# Patient Record
Sex: MALE | Race: WHITE | NOT HISPANIC OR LATINO | URBAN - METROPOLITAN AREA
[De-identification: names, ages, dates, MRNs, and addresses within clinical notes are randomized per-mention and may not be internally consistent; named-entity substitution may affect disease eponyms.]

---

## 2017-03-23 ENCOUNTER — EMERGENCY (EMERGENCY)
Facility: HOSPITAL | Age: 57
LOS: 1 days | Discharge: HOME | End: 2017-03-23
Attending: EMERGENCY MEDICINE | Admitting: EMERGENCY MEDICINE
Payer: COMMERCIAL

## 2017-03-23 VITALS
DIASTOLIC BLOOD PRESSURE: 93 MMHG | HEART RATE: 75 BPM | SYSTOLIC BLOOD PRESSURE: 172 MMHG | TEMPERATURE: 98 F | OXYGEN SATURATION: 99 % | RESPIRATION RATE: 18 BRPM

## 2017-03-23 VITALS
DIASTOLIC BLOOD PRESSURE: 89 MMHG | HEART RATE: 60 BPM | RESPIRATION RATE: 16 BRPM | OXYGEN SATURATION: 100 % | SYSTOLIC BLOOD PRESSURE: 155 MMHG | TEMPERATURE: 98 F

## 2017-03-23 DIAGNOSIS — M79.674 PAIN IN RIGHT TOE(S): ICD-10-CM

## 2017-03-23 LAB
ANION GAP SERPL CALC-SCNC: 10 MMOL/L — SIGNIFICANT CHANGE UP (ref 5–17)
BASOPHILS # BLD AUTO: 0.1 K/UL — SIGNIFICANT CHANGE UP (ref 0–0.2)
BASOPHILS NFR BLD AUTO: 1 % — SIGNIFICANT CHANGE UP (ref 0–2)
BUN SERPL-MCNC: 21 MG/DL — SIGNIFICANT CHANGE UP (ref 7–23)
CALCIUM SERPL-MCNC: 10.3 MG/DL — SIGNIFICANT CHANGE UP (ref 8.4–10.5)
CHLORIDE SERPL-SCNC: 102 MMOL/L — SIGNIFICANT CHANGE UP (ref 96–108)
CO2 SERPL-SCNC: 27 MMOL/L — SIGNIFICANT CHANGE UP (ref 22–31)
CREAT SERPL-MCNC: 0.97 MG/DL — SIGNIFICANT CHANGE UP (ref 0.5–1.3)
EOSINOPHIL # BLD AUTO: 0.5 K/UL — SIGNIFICANT CHANGE UP (ref 0–0.5)
EOSINOPHIL NFR BLD AUTO: 3.6 % — SIGNIFICANT CHANGE UP (ref 0–6)
GLUCOSE SERPL-MCNC: 85 MG/DL — SIGNIFICANT CHANGE UP (ref 70–99)
HCT VFR BLD CALC: 44.9 % — SIGNIFICANT CHANGE UP (ref 39–50)
HGB BLD-MCNC: 15.2 G/DL — SIGNIFICANT CHANGE UP (ref 13–17)
LYMPHOCYTES # BLD AUTO: 29.9 % — SIGNIFICANT CHANGE UP (ref 13–44)
LYMPHOCYTES # BLD AUTO: 3.8 K/UL — HIGH (ref 1–3.3)
MCHC RBC-ENTMCNC: 30.1 PG — SIGNIFICANT CHANGE UP (ref 27–34)
MCHC RBC-ENTMCNC: 33.7 GM/DL — SIGNIFICANT CHANGE UP (ref 32–36)
MCV RBC AUTO: 89.3 FL — SIGNIFICANT CHANGE UP (ref 80–100)
MONOCYTES # BLD AUTO: 1.1 K/UL — HIGH (ref 0–0.9)
MONOCYTES NFR BLD AUTO: 8.9 % — SIGNIFICANT CHANGE UP (ref 2–14)
NEUTROPHILS # BLD AUTO: 7.3 K/UL — SIGNIFICANT CHANGE UP (ref 1.8–7.4)
NEUTROPHILS NFR BLD AUTO: 56.6 % — SIGNIFICANT CHANGE UP (ref 43–77)
PLATELET # BLD AUTO: 225 K/UL — SIGNIFICANT CHANGE UP (ref 150–400)
POTASSIUM SERPL-MCNC: 4.4 MMOL/L — SIGNIFICANT CHANGE UP (ref 3.5–5.3)
POTASSIUM SERPL-SCNC: 4.4 MMOL/L — SIGNIFICANT CHANGE UP (ref 3.5–5.3)
RBC # BLD: 5.03 M/UL — SIGNIFICANT CHANGE UP (ref 4.2–5.8)
RBC # FLD: 12.4 % — SIGNIFICANT CHANGE UP (ref 10.3–14.5)
SODIUM SERPL-SCNC: 139 MMOL/L — SIGNIFICANT CHANGE UP (ref 135–145)
WBC # BLD: 12.9 K/UL — HIGH (ref 3.8–10.5)
WBC # FLD AUTO: 12.9 K/UL — HIGH (ref 3.8–10.5)

## 2017-03-23 PROCEDURE — 73660 X-RAY EXAM OF TOE(S): CPT | Mod: 26,RT

## 2017-03-23 PROCEDURE — 80048 BASIC METABOLIC PNL TOTAL CA: CPT

## 2017-03-23 PROCEDURE — 85027 COMPLETE CBC AUTOMATED: CPT

## 2017-03-23 PROCEDURE — 99284 EMERGENCY DEPT VISIT MOD MDM: CPT

## 2017-03-23 PROCEDURE — 73660 X-RAY EXAM OF TOE(S): CPT

## 2017-03-23 PROCEDURE — 99284 EMERGENCY DEPT VISIT MOD MDM: CPT | Mod: 25

## 2017-03-23 RX ORDER — ACETAMINOPHEN 500 MG
650 TABLET ORAL ONCE
Qty: 0 | Refills: 0 | Status: COMPLETED | OUTPATIENT
Start: 2017-03-23 | End: 2017-03-23

## 2017-03-23 RX ORDER — SODIUM CHLORIDE 9 MG/ML
1000 INJECTION INTRAMUSCULAR; INTRAVENOUS; SUBCUTANEOUS ONCE
Qty: 0 | Refills: 0 | Status: COMPLETED | OUTPATIENT
Start: 2017-03-23 | End: 2017-03-23

## 2017-03-23 RX ORDER — OXYCODONE HYDROCHLORIDE 5 MG/1
5 TABLET ORAL ONCE
Qty: 0 | Refills: 0 | Status: DISCONTINUED | OUTPATIENT
Start: 2017-03-23 | End: 2017-03-23

## 2017-03-23 RX ADMIN — OXYCODONE HYDROCHLORIDE 5 MILLIGRAM(S): 5 TABLET ORAL at 11:48

## 2017-03-23 RX ADMIN — Medication 650 MILLIGRAM(S): at 11:48

## 2017-03-23 RX ADMIN — SODIUM CHLORIDE 2000 MILLILITER(S): 9 INJECTION INTRAMUSCULAR; INTRAVENOUS; SUBCUTANEOUS at 13:22

## 2017-03-23 NOTE — ED PROVIDER NOTE - PHYSICAL EXAMINATION
R 1st toe with subungual hematoma ?pus with abrasion at the base with redness, 2nd toe with 2 small pressure ulcer in the medial aspect. ttp over both 1st and 2nd toe. No lymphangitis, no foot pain or deformity.

## 2017-03-23 NOTE — ED PROVIDER NOTE - ATTENDING CONTRIBUTION TO CARE
63 y/o M pmhx HTN, HLD, DM, on insulin and metformin, current smoker complaining of L 1st and 2nd toe pain worsening for last 10 days s;p crush injury with dresser. Seen in urgent care on sunday started on ?keflex with xray showing potential fracture, placed in maia tape states pain not improving came to ER. Has not seen podiatrist. 1st toe swollen, eccymotic with subungual hematoma with pus collection, 2nd toe has two small pressure ulcers. Tender to touch diffusely; no lymphangitis, no redness to foot. good pulses. Will obtain blood work. IV hydration, podiatry consult and reevaluation. WIL.

## 2017-03-23 NOTE — ED PROVIDER NOTE - PLAN OF CARE
1. Follow up with your Podiatry within 48-72hours.   2. Rest and elevate affected area. Keep the wound dry and clean. Clindamycin 300mg 4xday for 7 days. Take Motrin 600mg every 8 hours with food for pain.   3. Any worsening redness, swelling, streaking (red lines), fever, chills retrun to ER

## 2017-03-23 NOTE — ED PROVIDER NOTE - OBJECTIVE STATEMENT
57yom pmhx of HTN HLD DM on insulin and metformin c/o worsening pain to R 1st and 2nd toe s/p crush injury with dresser falling on it. Seen at the Urgent care 5 days ago and started on abx with xray showing fracture of the 1st toe. No fever or chills. No redness or swelling extending into the foot. No new injuries.

## 2017-03-23 NOTE — ED PROVIDER NOTE - PROGRESS NOTE DETAILS
seen by podiatrist. Nail removed with well healing nail bed injury. Wound care done. Wants abx (clinda) for one week with outpt follow up. Plan discussed with pt and agrees for outpt follow up. -BRYAN Olson

## 2017-03-23 NOTE — ED ADULT NURSE NOTE - OBJECTIVE STATEMENT
58 y/o male walked into ED a&ox3 c/o toe pain. pt states he was opening a drawer of his dresser last week when the drawer fell out and onto his right first toe. patient reports waiting about 3-4 days before going to an urgent care where he received xrays and ABX, dx broken toe. pt reports pain has increased and rated it 10/10. mild swelling noted to right foot, no redness noted. +pedal pulse +sensation. bruising noted to first toe and toenail, no drainage or bleeding. pt denies dizziness, HA, SOB, CP, fever/chills, swelling to legs. lung sounds clear b/l with auscultation. abd soft, nontender, nondistended. pt has histry of type 2 DM and HTN, did not take his BP meds today.

## 2017-03-23 NOTE — ED PROVIDER NOTE - CARE PLAN
Principal Discharge DX:	Foot ulcer due to secondary DM Principal Discharge DX:	Foot ulcer due to secondary DM  Instructions for follow-up, activity and diet:	1. Follow up with your Podiatry within 48-72hours.   2. Rest and elevate affected area. Keep the wound dry and clean. Clindamycin 300mg 4xday for 7 days. Take Motrin 600mg every 8 hours with food for pain.   3. Any worsening redness, swelling, streaking (red lines), fever, chills retrun to ER

## 2017-07-05 ENCOUNTER — TRANSCRIPTION ENCOUNTER (OUTPATIENT)
Age: 57
End: 2017-07-05

## 2017-07-05 PROBLEM — Z00.00 ENCOUNTER FOR PREVENTIVE HEALTH EXAMINATION: Status: ACTIVE | Noted: 2017-07-05

## 2017-07-06 ENCOUNTER — APPOINTMENT (OUTPATIENT)
Dept: SURGICAL ONCOLOGY | Facility: CLINIC | Age: 57
End: 2017-07-06

## 2017-07-06 VITALS
DIASTOLIC BLOOD PRESSURE: 100 MMHG | SYSTOLIC BLOOD PRESSURE: 159 MMHG | BODY MASS INDEX: 26.68 KG/M2 | WEIGHT: 166 LBS | HEART RATE: 90 BPM | OXYGEN SATURATION: 99 % | RESPIRATION RATE: 16 BRPM | HEIGHT: 66 IN | TEMPERATURE: 98.3 F

## 2017-07-06 RX ORDER — CLINDAMYCIN HYDROCHLORIDE 300 MG/1
300 CAPSULE ORAL EVERY 6 HOURS
Qty: 28 | Refills: 0 | Status: ACTIVE | COMMUNITY
Start: 2017-07-06 | End: 1900-01-01

## 2017-07-18 ENCOUNTER — APPOINTMENT (OUTPATIENT)
Dept: SURGICAL ONCOLOGY | Facility: CLINIC | Age: 57
End: 2017-07-18

## 2017-07-18 RX ORDER — METFORMIN HYDROCHLORIDE 625 MG/1
TABLET ORAL
Refills: 0 | Status: ACTIVE | COMMUNITY

## 2017-07-18 RX ORDER — METFORMIN ER 500 MG 500 MG/1
500 TABLET ORAL
Qty: 120 | Refills: 0 | Status: ACTIVE | COMMUNITY
Start: 2016-11-21

## 2017-07-18 RX ORDER — PEN NEEDLE, DIABETIC 29 G X1/2"
32G X 4 MM NEEDLE, DISPOSABLE MISCELLANEOUS
Qty: 100 | Refills: 0 | Status: ACTIVE | COMMUNITY
Start: 2016-11-18

## 2017-07-18 RX ORDER — INSULIN GLARGINE 300 U/ML
300 INJECTION, SOLUTION SUBCUTANEOUS
Qty: 4 | Refills: 0 | Status: ACTIVE | COMMUNITY
Start: 2016-11-18

## 2017-07-18 RX ORDER — AMLODIPINE BESYLATE 10 MG/1
10 TABLET ORAL
Qty: 30 | Refills: 0 | Status: ACTIVE | COMMUNITY
Start: 2017-07-12

## 2017-07-18 RX ORDER — RAMIPRIL 5 MG/1
5 CAPSULE ORAL
Qty: 30 | Refills: 0 | Status: ACTIVE | COMMUNITY
Start: 2017-07-12

## 2017-07-18 RX ORDER — LANCETS 28 GAUGE
EACH MISCELLANEOUS
Qty: 100 | Refills: 0 | Status: ACTIVE | COMMUNITY
Start: 2016-11-18

## 2017-07-18 RX ORDER — BLOOD SUGAR DIAGNOSTIC
STRIP MISCELLANEOUS
Qty: 100 | Refills: 0 | Status: ACTIVE | COMMUNITY
Start: 2016-12-16

## 2017-08-29 ENCOUNTER — APPOINTMENT (OUTPATIENT)
Dept: SURGICAL ONCOLOGY | Facility: CLINIC | Age: 57
End: 2017-08-29

## 2017-10-03 ENCOUNTER — APPOINTMENT (OUTPATIENT)
Dept: SURGICAL ONCOLOGY | Facility: CLINIC | Age: 57
End: 2017-10-03
Payer: COMMERCIAL

## 2017-10-03 VITALS
RESPIRATION RATE: 15 BRPM | WEIGHT: 172 LBS | SYSTOLIC BLOOD PRESSURE: 133 MMHG | BODY MASS INDEX: 27.64 KG/M2 | OXYGEN SATURATION: 98 % | HEIGHT: 66 IN | HEART RATE: 83 BPM | DIASTOLIC BLOOD PRESSURE: 92 MMHG

## 2017-10-03 DIAGNOSIS — L02.212 CUTANEOUS ABSCESS OF BACK [ANY PART, EXCEPT BUTTOCK]: ICD-10-CM

## 2017-10-03 PROCEDURE — 99212 OFFICE O/P EST SF 10 MIN: CPT

## 2020-07-10 ENCOUNTER — TRANSCRIPTION ENCOUNTER (OUTPATIENT)
Age: 60
End: 2020-07-10

## 2020-12-29 NOTE — ED ADULT NURSE NOTE - FALL HARM RISK CONCLUSION
Dr Kebede's message:  Please inform the patient and or family regarding MRI results.     The MRI did not demonstrate a mass or tumor, or large strokes, or evidence of abscess or evidence of previous trauma.     The brain images did demonstrate atrophy (volume loss) of the brain characterized as mild and can be associated with normal aging.     The brain images did demonstrate small blood vessel ischemic disease. This can be associated with normal aging as well.     I have personally reviewed the study images. I will review the MRI findings at the next scheduled visit and discuss the importance of these findings as related to memory changes.  =====================  Called to pt's wife Lucía Galvez at [phone]. Relayed all of Dr Welch's message above re: /Bayron's MRI of the Brain results.    Lucía Conrad would like Bayron seen by Dr Kebede soon while she is still off work.    Appointment made for tomorrow per Lucía Conrad's request, Wednesday 12/30/20 at 1430pm.   Universal Safety Interventions

## 2021-02-06 ENCOUNTER — TRANSCRIPTION ENCOUNTER (OUTPATIENT)
Age: 61
End: 2021-02-06

## 2022-06-30 NOTE — ED PROVIDER NOTE - CONSTITUTIONAL [-], MLM
Skin normal color for race, warm, dry and intact. No evidence of rash.
no weight loss/no fever/no night sweats

## 2022-07-06 ENCOUNTER — NON-APPOINTMENT (OUTPATIENT)
Age: 62
End: 2022-07-06

## 2022-11-15 ENCOUNTER — NON-APPOINTMENT (OUTPATIENT)
Age: 62
End: 2022-11-15

## 2022-11-21 ENCOUNTER — APPOINTMENT (OUTPATIENT)
Dept: ORTHOPEDIC SURGERY | Facility: CLINIC | Age: 62
End: 2022-11-21

## 2022-11-21 VITALS — WEIGHT: 167 LBS | BODY MASS INDEX: 26.84 KG/M2 | HEIGHT: 66 IN

## 2022-11-21 DIAGNOSIS — M25.512 PAIN IN RIGHT SHOULDER: ICD-10-CM

## 2022-11-21 DIAGNOSIS — M17.0 BILATERAL PRIMARY OSTEOARTHRITIS OF KNEE: ICD-10-CM

## 2022-11-21 DIAGNOSIS — M25.511 PAIN IN RIGHT SHOULDER: ICD-10-CM

## 2022-11-21 DIAGNOSIS — G89.29 PAIN IN RIGHT SHOULDER: ICD-10-CM

## 2022-11-21 PROCEDURE — 99204 OFFICE O/P NEW MOD 45 MIN: CPT

## 2022-11-21 PROCEDURE — 73564 X-RAY EXAM KNEE 4 OR MORE: CPT | Mod: 50

## 2022-11-21 PROCEDURE — 73030 X-RAY EXAM OF SHOULDER: CPT | Mod: 50

## 2022-11-21 NOTE — HISTORY OF PRESENT ILLNESS
[de-identified] : 62-year-old male presents with bilateral knee pain for approximately 1 month.  No injury or inciting event.  He has anterior and posterior knee pain, worse with stairs and increased ambulation.  Symptoms improved with rest.  Denies any noticeable swelling.  Denies buckling, locking, clicking, loss of motion.  He has stiffness and dull pain.  He has been taking Advil and Tylenol.  No history of physical therapy or injections.\par Also reports bilateral shoulder pain over the past few weeks.  No associated injury.  Worse with reaching overhead and away from him.  Not disrupting sleep, denies distal numbness or tingling.  He has noticed bilateral hand and digit swelling over the past 2 weeks and is not sure if this is related.\par He works as a  at a bank.

## 2022-11-21 NOTE — PHYSICAL EXAM
[de-identified] : General: No acute distress\par Mental: Alert and oriented x3\par Eyes: Conjunctivitis not seen\par Chest: Symmetric chest rise, no audible wheezing\par Skin: Bilateral lower extremities absent from rashes and ulcers\par Abdomen: No distention\par \par Right knee:\par Skin: Clean, dry, intact \par Inspection: No obvious malalignment, trace effusion. \par Tenderness: no MJLT. no LJLT. No tenderness over the medial and lateral patella facets. No ttp medial/lateral epicondyle, patella tendon, tibial tubercle, pes anserinus, or proximal fibula. \par ROM: 0 to 130°  \par Stability: Stable to varus and valgus, negative lachman. Negative anterior/posterior drawer. \par Additional tests: Negative McMurrays test, negative Steinmann, Negative patellar grind test.  \par Strength: 5/5 Q/H/TA/GS/EHL, no atrophy \par Neuro: Sensation intact to light touch throughout in dp/sp/tib/tania/saph nerve distributions\par Pulses: 2+ DP/PT pulses.\par \par \par Left knee:\par Skin: Clean, dry, intact \par Inspection: No obvious malalignment, trace effusion. \par Tenderness: no MJLT. no LJLT. No tenderness over the medial and lateral patella facets. No ttp medial/lateral epicondyle, patella tendon, tibial tubercle, pes anserinus, or proximal fibula. \par ROM: 0 to 130°\par Stability: Stable to varus and valgus, negative lachman. Negative anterior/posterior drawer. \par Additional tests: Negative McMurrays test, mildly positive Steinmann, Negative patellar grind test.  \par Strength: 5/5 Q/H/TA/GS/EHL, no atrophy \par Neuro: Sensation intact to light touch throughout in dp/sp/tib/tania/saph nerve distributions\par Pulses: 2+ DP/PT pulses.\par \par Bilateral shoulders: Flexion 170, external rotation 60, internal rotation upper lumbar spine.  5/5 strength throughout.  None tender about the shoulder. [de-identified] : Bilateral knee x-rays demonstrate medial joint space narrowing, subchondral sclerosis at the tibia, small osteophytes.\par Bilateral shoulder x-rays no fractures or dislocations.  Mild joint space narrowing, small inferior osteophyte.

## 2022-11-21 NOTE — DISCUSSION/SUMMARY
[de-identified] : 62-year-old male with moderate degenerative joint arthritis of the bilateral knees.  Also with mild degenerative arthritis of the bilateral shoulders.  Discussed diagnosis and management.\par I described the spectrum of treatment from nonoperative modalities to total joint arthroplasty. Noninvasive and nonoperative treatment modalities include weight reduction, activity modification with low impact exercise, PRN use of acetaminophen or anti-inflammatory medication if tolerated, glucosamine/chondroitin supplements, and physical therapy. Further treatments can include corticosteroid injection and the use of hyaluronic acid injections. Definitive treatment can certainly include total joint arthroplasty also.\par Prescription for naproxen sent to his pharmacy.  Side effects of the medication reviewed.  Prescription for physical therapy given.\par He will see a hand specialist regarding his bilateral hand swelling.\par Follow-up in approximately 6 months.

## 2022-12-02 ENCOUNTER — APPOINTMENT (OUTPATIENT)
Dept: ORTHOPEDIC SURGERY | Facility: CLINIC | Age: 62
End: 2022-12-02

## 2022-12-02 VITALS — BODY MASS INDEX: 27.32 KG/M2 | WEIGHT: 170 LBS | HEIGHT: 66 IN

## 2022-12-02 DIAGNOSIS — M19.032 PRIMARY OSTEOARTHRITIS, LEFT WRIST: ICD-10-CM

## 2022-12-02 DIAGNOSIS — M79.642 PAIN IN RIGHT HAND: ICD-10-CM

## 2022-12-02 DIAGNOSIS — M19.031 PRIMARY OSTEOARTHRITIS, RIGHT WRIST: ICD-10-CM

## 2022-12-02 DIAGNOSIS — M79.641 PAIN IN RIGHT HAND: ICD-10-CM

## 2022-12-02 DIAGNOSIS — E11.9 TYPE 2 DIABETES MELLITUS W/OUT COMPLICATIONS: ICD-10-CM

## 2022-12-02 DIAGNOSIS — I10 ESSENTIAL (PRIMARY) HYPERTENSION: ICD-10-CM

## 2022-12-02 PROCEDURE — 73110 X-RAY EXAM OF WRIST: CPT | Mod: RT

## 2022-12-02 PROCEDURE — 99204 OFFICE O/P NEW MOD 45 MIN: CPT | Mod: 25

## 2022-12-02 PROCEDURE — 20605 DRAIN/INJ JOINT/BURSA W/O US: CPT | Mod: RT

## 2022-12-02 NOTE — HISTORY OF PRESENT ILLNESS
[8] : 8 [0] : 0 [Localized] : localized [Sharp] : sharp [Full time] : Work status: full time [de-identified] : 62 year old with 2-3 weeks bilateral hand and wrist swelling, pain, stiffness.  NO previous trauma.  Has had no intervention.  Takes naproxen.  No clear benefit.  No clear exacerbating factors [] : Post Surgical Visit: no [FreeTextEntry1] : B/l Hands  [FreeTextEntry3] : 11/2022 [FreeTextEntry5] : 63 Y/O LHD M eval B/L Hands atraumatic pain and swelling since november with no prior TX Pt states HX of generalized OA  [de-identified] : None [de-identified] :

## 2022-12-02 NOTE — PHYSICAL EXAM
[Bilateral] : wrists bilaterally [FreeTextEntry3] : BIlateral hand swelling, RIGHT wrise than LEFT\par Pain with ROM of all fingers.  Unable to make a full fist, RIGHT is worse than LEFT.  Pain with supination and pronation, RIGHT wrose than LET [FreeTextEntry8] : MIdl IP arthritis bilaterally.  Advanced DRUJ arthritis bialterally

## 2022-12-02 NOTE — DISCUSSION/SUMMARY
[Medication Risks Reviewed] : Medication risks reviewed [Surgical risks reviewed] : Surgical risks reviewed [de-identified] : Discussed NSAIDs, medrol, CSI\par Katherine wished to receiv einjection in RIGHT DRUJ\par In view of the location and severity of arthritis, as well as his pains in other joints, recommended he see rheumatology

## 2022-12-02 NOTE — PROCEDURE
[Medium Joint Injection] : medium joint injection [Right] : of the right [Other: ____] : [unfilled] [Pain] : pain [Inflammation] : inflammation [Alcohol] : alcohol [Ethyl Chloride sprayed topically] : ethyl chloride sprayed topically [Sterile technique used] : sterile technique used [___ cc    1%] : Lidocaine ~Vcc of 1%  [___ cc    10mg] : Triamcinolone (Kenalog) ~Vcc of 10 mg

## 2022-12-05 ENCOUNTER — APPOINTMENT (OUTPATIENT)
Dept: ORTHOPEDIC SURGERY | Facility: CLINIC | Age: 62
End: 2022-12-05

## 2022-12-23 ENCOUNTER — APPOINTMENT (OUTPATIENT)
Dept: ORTHOPEDIC SURGERY | Facility: CLINIC | Age: 62
End: 2022-12-23

## 2023-04-19 ENCOUNTER — APPOINTMENT (OUTPATIENT)
Dept: ORTHOPEDIC SURGERY | Facility: CLINIC | Age: 63
End: 2023-04-19
Payer: COMMERCIAL

## 2023-04-19 VITALS — WEIGHT: 160 LBS | BODY MASS INDEX: 25.71 KG/M2 | HEIGHT: 66 IN

## 2023-04-19 DIAGNOSIS — M25.461 EFFUSION, RIGHT KNEE: ICD-10-CM

## 2023-04-19 DIAGNOSIS — M17.11 UNILATERAL PRIMARY OSTEOARTHRITIS, RIGHT KNEE: ICD-10-CM

## 2023-04-19 PROCEDURE — 99214 OFFICE O/P EST MOD 30 MIN: CPT | Mod: 25

## 2023-04-19 PROCEDURE — 73564 X-RAY EXAM KNEE 4 OR MORE: CPT | Mod: RT

## 2023-04-19 NOTE — PHYSICAL EXAM
[Right] : right knee [NL (0)] : extension 0 degrees [4___] : hamstring 4[unfilled]/5 [Equivocal] : equivocal Jaspal [Negative] : negative anterior draw [] : negative Lachmann [FreeTextEntry3] : clean abrasion to anterior patella - no drainage [de-identified] : pain [TWNoteComboBox7] : flexion 90 degrees

## 2023-04-19 NOTE — PROCEDURE
[Large Joint Injection] : Large joint injection [Right] : of the right [Knee] : knee [Pain] : pain [X-ray evidence of Osteoarthritis on this or prior visit] : x-ray evidence of Osteoarthritis on this or prior visit [Betadine] : betadine [Ethyl Chloride sprayed topically] : ethyl chloride sprayed topically [Sterile technique used] : sterile technique used [___ cc    1%] : Lidocaine ~Vcc of 1%  [___ cc    0.5%] : Bupivacaine (Marcaine) ~Vcc of 0.5%  [Effusion] : effusion [Cell count/dif] : cell count/dif [GS] : GS [Culture] : culture [Crystals] : crystals [] : Patient tolerated procedure well [Call if redness, pain or fever occur] : call if redness, pain or fever occur [Apply ice for 15min out of every hour for the next 12-24 hours as tolerated] : apply ice for 15 minutes out of every hour for the next 12-24 hours as tolerated [Patient was advised to rest the joint(s) for ____ days] : patient was advised to rest the joint(s) for [unfilled] days [Risks, benefits, alternatives discussed / Verbal consent obtained] : the risks benefits, and alternatives have been discussed, and verbal consent was obtained [All ultrasound images have been permanently captured and stored accordingly in our picture archiving and communication system] : All ultrasound images have been permanently captured and stored accordingly in our picture archiving and communication system [Visualization of the needle and placement of injection was performed without complication] : visualization of the needle and placement of injection was performed without complication [Inflammation] : inflammation [de-identified] : 5 cc [de-identified] : yellow [de-identified] : cloudy [de-identified] : Effusion aspiration

## 2023-04-19 NOTE — IMAGING
[Right] : right knee [AP] : anteroposterior [Lateral] : lateral [[place]] : [place] [AP Standing] : anteroposterior standing [Mild patellofemoral OA] : Mild patellofemoral OA [Moderate patellofemoral OA] : Moderate patellofemoral OA

## 2023-04-19 NOTE — DISCUSSION/SUMMARY
[de-identified] : 63m with right knee\par 1) possible rheum consult for multiple joint complaints\par 2) defer steroids as patient is noncompliant with medication and lab regimens\par 3) Naproxen rx - gi precautions reviewed \par 4) aspiration right knee today - tolerated well \par 5) will send fluid for gram stain, culture, crystals\par \par Patient seen by Dr. George Nair.\par Progress note completed by Mary Ann GUTIERREZ.\par Patient seen by Mary Ann GUTIERREZ under the supervision of Dr. George Nair.

## 2023-04-19 NOTE — HISTORY OF PRESENT ILLNESS
[de-identified] : 04/19/2023 : AIXA LARSON is a 63 year male presenting today for right knee pain. Patient reports that he started experiencing right knee pain 4/10/23 with no specific ANAY. Reports constant sharp pain since onset. Worse with standing, walking Better with rest, ice, NSAIDs. No Prior treatment. Occupation: security - desk\par PMH: Diabetic - does not know recent A1C "I haven't taken it in a while."  He is admittedly not diligent with his insulin.

## 2023-04-20 RX ORDER — NAPROXEN 500 MG/1
500 TABLET ORAL TWICE DAILY
Qty: 30 | Refills: 1 | Status: ACTIVE | COMMUNITY
Start: 2023-04-20 | End: 1900-01-01

## 2023-04-26 ENCOUNTER — APPOINTMENT (OUTPATIENT)
Dept: ORTHOPEDIC SURGERY | Facility: CLINIC | Age: 63
End: 2023-04-26

## 2023-04-26 LAB
B PERT IGG+IGM PNL SER: ABNORMAL
COLOR FLD: YELLOW
FLUID INTAKE SUBSTANCE CLASS: NORMAL
LYMPHOCYTES # FLD MANUAL: 1 %
MONOS+MACROS NFR FLD MANUAL: 33 %
NEUTS SEG # FLD MANUAL: 66 %
RBC # FLD MANUAL: 8000 /UL
SYCRY CLARITY: ABNORMAL
SYCRY COLOR: YELLOW
SYCRY ID: ABNORMAL
SYCRY TUBE: NORMAL
TOTAL CELLS COUNTED FLD: NORMAL /UL
TUBE TYPE: NORMAL

## 2023-05-05 LAB — JOINT CULTURE: NORMAL

## 2023-05-17 ENCOUNTER — RX RENEWAL (OUTPATIENT)
Age: 63
End: 2023-05-17

## 2023-05-17 RX ORDER — NAPROXEN 500 MG/1
500 TABLET ORAL
Qty: 60 | Refills: 1 | Status: ACTIVE | COMMUNITY
Start: 2022-11-21 | End: 1900-01-01

## 2023-05-22 ENCOUNTER — APPOINTMENT (OUTPATIENT)
Dept: ORTHOPEDIC SURGERY | Facility: CLINIC | Age: 63
End: 2023-05-22

## 2023-06-15 ENCOUNTER — NON-APPOINTMENT (OUTPATIENT)
Age: 63
End: 2023-06-15

## 2023-11-15 ENCOUNTER — INPATIENT (INPATIENT)
Facility: HOSPITAL | Age: 63
LOS: 22 days | Discharge: NURSING, REHAB OR LONG-TERM CARE | DRG: 239 | End: 2023-12-08
Attending: INTERNAL MEDICINE | Admitting: INTERNAL MEDICINE
Payer: SELF-PAY

## 2023-11-15 VITALS
RESPIRATION RATE: 18 BRPM | HEIGHT: 67 IN | TEMPERATURE: 98 F | DIASTOLIC BLOOD PRESSURE: 87 MMHG | OXYGEN SATURATION: 96 % | WEIGHT: 141.1 LBS | SYSTOLIC BLOOD PRESSURE: 165 MMHG | HEART RATE: 93 BPM

## 2023-11-15 DIAGNOSIS — I99.8 OTHER DISORDER OF CIRCULATORY SYSTEM: ICD-10-CM

## 2023-11-15 LAB
ALBUMIN SERPL ELPH-MCNC: 2.5 G/DL — LOW (ref 3.3–5)
ALBUMIN SERPL ELPH-MCNC: 2.5 G/DL — LOW (ref 3.3–5)
ALBUMIN SERPL ELPH-MCNC: 2.8 G/DL — LOW (ref 3.3–5)
ALBUMIN SERPL ELPH-MCNC: 2.8 G/DL — LOW (ref 3.3–5)
ALP SERPL-CCNC: 68 U/L — SIGNIFICANT CHANGE UP (ref 40–120)
ALP SERPL-CCNC: 68 U/L — SIGNIFICANT CHANGE UP (ref 40–120)
ALP SERPL-CCNC: 73 U/L — SIGNIFICANT CHANGE UP (ref 40–120)
ALP SERPL-CCNC: 73 U/L — SIGNIFICANT CHANGE UP (ref 40–120)
ALT FLD-CCNC: 20 U/L — SIGNIFICANT CHANGE UP (ref 10–45)
ALT FLD-CCNC: 20 U/L — SIGNIFICANT CHANGE UP (ref 10–45)
ALT FLD-CCNC: 24 U/L — SIGNIFICANT CHANGE UP (ref 10–45)
ALT FLD-CCNC: 24 U/L — SIGNIFICANT CHANGE UP (ref 10–45)
ANION GAP SERPL CALC-SCNC: 11 MMOL/L — SIGNIFICANT CHANGE UP (ref 5–17)
ANION GAP SERPL CALC-SCNC: 11 MMOL/L — SIGNIFICANT CHANGE UP (ref 5–17)
APTT BLD: 27.3 SEC — SIGNIFICANT CHANGE UP (ref 24.5–35.6)
APTT BLD: 27.3 SEC — SIGNIFICANT CHANGE UP (ref 24.5–35.6)
AST SERPL-CCNC: 27 U/L — SIGNIFICANT CHANGE UP (ref 10–40)
AST SERPL-CCNC: 27 U/L — SIGNIFICANT CHANGE UP (ref 10–40)
AST SERPL-CCNC: 30 U/L — SIGNIFICANT CHANGE UP (ref 10–40)
AST SERPL-CCNC: 30 U/L — SIGNIFICANT CHANGE UP (ref 10–40)
BASE EXCESS BLDV CALC-SCNC: -2.2 MMOL/L — LOW (ref -2–3)
BASE EXCESS BLDV CALC-SCNC: -2.2 MMOL/L — LOW (ref -2–3)
BASOPHILS # BLD AUTO: 0.02 K/UL — SIGNIFICANT CHANGE UP (ref 0–0.2)
BASOPHILS # BLD AUTO: 0.02 K/UL — SIGNIFICANT CHANGE UP (ref 0–0.2)
BASOPHILS NFR BLD AUTO: 0.2 % — SIGNIFICANT CHANGE UP (ref 0–2)
BASOPHILS NFR BLD AUTO: 0.2 % — SIGNIFICANT CHANGE UP (ref 0–2)
BILIRUB SERPL-MCNC: 0.4 MG/DL — SIGNIFICANT CHANGE UP (ref 0.2–1.2)
BILIRUB SERPL-MCNC: 0.4 MG/DL — SIGNIFICANT CHANGE UP (ref 0.2–1.2)
BILIRUB SERPL-MCNC: 0.5 MG/DL — SIGNIFICANT CHANGE UP (ref 0.2–1.2)
BILIRUB SERPL-MCNC: 0.5 MG/DL — SIGNIFICANT CHANGE UP (ref 0.2–1.2)
BLD GP AB SCN SERPL QL: NEGATIVE — SIGNIFICANT CHANGE UP
BLD GP AB SCN SERPL QL: NEGATIVE — SIGNIFICANT CHANGE UP
BUN SERPL-MCNC: 28 MG/DL — HIGH (ref 7–23)
CA-I SERPL-SCNC: 1.27 MMOL/L — SIGNIFICANT CHANGE UP (ref 1.15–1.33)
CA-I SERPL-SCNC: 1.27 MMOL/L — SIGNIFICANT CHANGE UP (ref 1.15–1.33)
CALCIUM SERPL-MCNC: 8.3 MG/DL — LOW (ref 8.4–10.5)
CALCIUM SERPL-MCNC: 8.3 MG/DL — LOW (ref 8.4–10.5)
CALCIUM SERPL-MCNC: 8.7 MG/DL — SIGNIFICANT CHANGE UP (ref 8.4–10.5)
CALCIUM SERPL-MCNC: 8.7 MG/DL — SIGNIFICANT CHANGE UP (ref 8.4–10.5)
CHLORIDE BLDV-SCNC: 103 MMOL/L — SIGNIFICANT CHANGE UP (ref 96–108)
CHLORIDE BLDV-SCNC: 103 MMOL/L — SIGNIFICANT CHANGE UP (ref 96–108)
CHLORIDE SERPL-SCNC: 102 MMOL/L — SIGNIFICANT CHANGE UP (ref 96–108)
CK SERPL-CCNC: 371 U/L — HIGH (ref 30–200)
CK SERPL-CCNC: 371 U/L — HIGH (ref 30–200)
CO2 BLDV-SCNC: 24 MMOL/L — SIGNIFICANT CHANGE UP (ref 22–26)
CO2 BLDV-SCNC: 24 MMOL/L — SIGNIFICANT CHANGE UP (ref 22–26)
CO2 SERPL-SCNC: 19 MMOL/L — LOW (ref 22–31)
CO2 SERPL-SCNC: 19 MMOL/L — LOW (ref 22–31)
CO2 SERPL-SCNC: 22 MMOL/L — SIGNIFICANT CHANGE UP (ref 22–31)
CO2 SERPL-SCNC: 22 MMOL/L — SIGNIFICANT CHANGE UP (ref 22–31)
CREAT SERPL-MCNC: 1.47 MG/DL — HIGH (ref 0.5–1.3)
CREAT SERPL-MCNC: 1.47 MG/DL — HIGH (ref 0.5–1.3)
CREAT SERPL-MCNC: 1.67 MG/DL — HIGH (ref 0.5–1.3)
CREAT SERPL-MCNC: 1.67 MG/DL — HIGH (ref 0.5–1.3)
CRP SERPL-MCNC: 121 MG/L — HIGH (ref 0–4)
CRP SERPL-MCNC: 121 MG/L — HIGH (ref 0–4)
EGFR: 45 ML/MIN/1.73M2 — LOW
EGFR: 45 ML/MIN/1.73M2 — LOW
EGFR: 53 ML/MIN/1.73M2 — LOW
EGFR: 53 ML/MIN/1.73M2 — LOW
EOSINOPHIL # BLD AUTO: 0 K/UL — SIGNIFICANT CHANGE UP (ref 0–0.5)
EOSINOPHIL # BLD AUTO: 0 K/UL — SIGNIFICANT CHANGE UP (ref 0–0.5)
EOSINOPHIL NFR BLD AUTO: 0 % — SIGNIFICANT CHANGE UP (ref 0–6)
EOSINOPHIL NFR BLD AUTO: 0 % — SIGNIFICANT CHANGE UP (ref 0–6)
ERYTHROCYTE [SEDIMENTATION RATE] IN BLOOD: 59 MM/HR — HIGH (ref 0–20)
ERYTHROCYTE [SEDIMENTATION RATE] IN BLOOD: 59 MM/HR — HIGH (ref 0–20)
GAS PNL BLDV: 134 MMOL/L — LOW (ref 136–145)
GAS PNL BLDV: 134 MMOL/L — LOW (ref 136–145)
GAS PNL BLDV: SIGNIFICANT CHANGE UP
GLUCOSE BLDV-MCNC: 240 MG/DL — HIGH (ref 70–99)
GLUCOSE BLDV-MCNC: 240 MG/DL — HIGH (ref 70–99)
GLUCOSE SERPL-MCNC: 232 MG/DL — HIGH (ref 70–99)
GLUCOSE SERPL-MCNC: 232 MG/DL — HIGH (ref 70–99)
GLUCOSE SERPL-MCNC: 236 MG/DL — HIGH (ref 70–99)
GLUCOSE SERPL-MCNC: 236 MG/DL — HIGH (ref 70–99)
HCO3 BLDV-SCNC: 23 MMOL/L — SIGNIFICANT CHANGE UP (ref 22–29)
HCO3 BLDV-SCNC: 23 MMOL/L — SIGNIFICANT CHANGE UP (ref 22–29)
HCT VFR BLD CALC: 39.3 % — SIGNIFICANT CHANGE UP (ref 39–50)
HCT VFR BLD CALC: 39.3 % — SIGNIFICANT CHANGE UP (ref 39–50)
HCT VFR BLDA CALC: 38 % — LOW (ref 39–51)
HCT VFR BLDA CALC: 38 % — LOW (ref 39–51)
HGB BLD CALC-MCNC: 12.5 G/DL — LOW (ref 12.6–17.4)
HGB BLD CALC-MCNC: 12.5 G/DL — LOW (ref 12.6–17.4)
HGB BLD-MCNC: 12.3 G/DL — LOW (ref 13–17)
HGB BLD-MCNC: 12.3 G/DL — LOW (ref 13–17)
IMM GRANULOCYTES NFR BLD AUTO: 0.8 % — SIGNIFICANT CHANGE UP (ref 0–0.9)
IMM GRANULOCYTES NFR BLD AUTO: 0.8 % — SIGNIFICANT CHANGE UP (ref 0–0.9)
INR BLD: 1.07 RATIO — SIGNIFICANT CHANGE UP (ref 0.85–1.18)
INR BLD: 1.07 RATIO — SIGNIFICANT CHANGE UP (ref 0.85–1.18)
LACTATE BLDV-MCNC: 2.1 MMOL/L — HIGH (ref 0.5–2)
LACTATE BLDV-MCNC: 2.1 MMOL/L — HIGH (ref 0.5–2)
LYMPHOCYTES # BLD AUTO: 1.18 K/UL — SIGNIFICANT CHANGE UP (ref 1–3.3)
LYMPHOCYTES # BLD AUTO: 1.18 K/UL — SIGNIFICANT CHANGE UP (ref 1–3.3)
LYMPHOCYTES # BLD AUTO: 12.8 % — LOW (ref 13–44)
LYMPHOCYTES # BLD AUTO: 12.8 % — LOW (ref 13–44)
MCHC RBC-ENTMCNC: 24.7 PG — LOW (ref 27–34)
MCHC RBC-ENTMCNC: 24.7 PG — LOW (ref 27–34)
MCHC RBC-ENTMCNC: 31.3 GM/DL — LOW (ref 32–36)
MCHC RBC-ENTMCNC: 31.3 GM/DL — LOW (ref 32–36)
MCV RBC AUTO: 78.9 FL — LOW (ref 80–100)
MCV RBC AUTO: 78.9 FL — LOW (ref 80–100)
MONOCYTES # BLD AUTO: 0.45 K/UL — SIGNIFICANT CHANGE UP (ref 0–0.9)
MONOCYTES # BLD AUTO: 0.45 K/UL — SIGNIFICANT CHANGE UP (ref 0–0.9)
MONOCYTES NFR BLD AUTO: 4.9 % — SIGNIFICANT CHANGE UP (ref 2–14)
MONOCYTES NFR BLD AUTO: 4.9 % — SIGNIFICANT CHANGE UP (ref 2–14)
NEUTROPHILS # BLD AUTO: 7.53 K/UL — HIGH (ref 1.8–7.4)
NEUTROPHILS # BLD AUTO: 7.53 K/UL — HIGH (ref 1.8–7.4)
NEUTROPHILS NFR BLD AUTO: 81.3 % — HIGH (ref 43–77)
NEUTROPHILS NFR BLD AUTO: 81.3 % — HIGH (ref 43–77)
NRBC # BLD: 0 /100 WBCS — SIGNIFICANT CHANGE UP (ref 0–0)
NRBC # BLD: 0 /100 WBCS — SIGNIFICANT CHANGE UP (ref 0–0)
PCO2 BLDV: 41 MMHG — LOW (ref 42–55)
PCO2 BLDV: 41 MMHG — LOW (ref 42–55)
PH BLDV: 7.36 — SIGNIFICANT CHANGE UP (ref 7.32–7.43)
PH BLDV: 7.36 — SIGNIFICANT CHANGE UP (ref 7.32–7.43)
PLATELET # BLD AUTO: 135 K/UL — LOW (ref 150–400)
PLATELET # BLD AUTO: 135 K/UL — LOW (ref 150–400)
PO2 BLDV: 30 MMHG — SIGNIFICANT CHANGE UP (ref 25–45)
PO2 BLDV: 30 MMHG — SIGNIFICANT CHANGE UP (ref 25–45)
POTASSIUM BLDV-SCNC: 5.2 MMOL/L — HIGH (ref 3.5–5.1)
POTASSIUM BLDV-SCNC: 5.2 MMOL/L — HIGH (ref 3.5–5.1)
POTASSIUM SERPL-MCNC: 5.1 MMOL/L — SIGNIFICANT CHANGE UP (ref 3.5–5.3)
POTASSIUM SERPL-SCNC: 5.1 MMOL/L — SIGNIFICANT CHANGE UP (ref 3.5–5.3)
PROT SERPL-MCNC: 5.4 G/DL — LOW (ref 6–8.3)
PROT SERPL-MCNC: 5.4 G/DL — LOW (ref 6–8.3)
PROT SERPL-MCNC: 6.5 G/DL — SIGNIFICANT CHANGE UP (ref 6–8.3)
PROT SERPL-MCNC: 6.5 G/DL — SIGNIFICANT CHANGE UP (ref 6–8.3)
PROTHROM AB SERPL-ACNC: 11.8 SEC — SIGNIFICANT CHANGE UP (ref 9.5–13)
PROTHROM AB SERPL-ACNC: 11.8 SEC — SIGNIFICANT CHANGE UP (ref 9.5–13)
RBC # BLD: 4.98 M/UL — SIGNIFICANT CHANGE UP (ref 4.2–5.8)
RBC # BLD: 4.98 M/UL — SIGNIFICANT CHANGE UP (ref 4.2–5.8)
RBC # FLD: 16.5 % — HIGH (ref 10.3–14.5)
RBC # FLD: 16.5 % — HIGH (ref 10.3–14.5)
RH IG SCN BLD-IMP: POSITIVE — SIGNIFICANT CHANGE UP
RH IG SCN BLD-IMP: POSITIVE — SIGNIFICANT CHANGE UP
SAO2 % BLDV: 38.6 % — LOW (ref 67–88)
SAO2 % BLDV: 38.6 % — LOW (ref 67–88)
SODIUM SERPL-SCNC: 134 MMOL/L — LOW (ref 135–145)
SODIUM SERPL-SCNC: 134 MMOL/L — LOW (ref 135–145)
SODIUM SERPL-SCNC: 135 MMOL/L — SIGNIFICANT CHANGE UP (ref 135–145)
SODIUM SERPL-SCNC: 135 MMOL/L — SIGNIFICANT CHANGE UP (ref 135–145)
WBC # BLD: 9.25 K/UL — SIGNIFICANT CHANGE UP (ref 3.8–10.5)
WBC # BLD: 9.25 K/UL — SIGNIFICANT CHANGE UP (ref 3.8–10.5)
WBC # FLD AUTO: 9.25 K/UL — SIGNIFICANT CHANGE UP (ref 3.8–10.5)
WBC # FLD AUTO: 9.25 K/UL — SIGNIFICANT CHANGE UP (ref 3.8–10.5)

## 2023-11-15 PROCEDURE — 73706 CT ANGIO LWR EXTR W/O&W/DYE: CPT | Mod: 26,RT,MA

## 2023-11-15 PROCEDURE — 99253 IP/OBS CNSLTJ NEW/EST LOW 45: CPT

## 2023-11-15 PROCEDURE — 99221 1ST HOSP IP/OBS SF/LOW 40: CPT

## 2023-11-15 PROCEDURE — 99291 CRITICAL CARE FIRST HOUR: CPT

## 2023-11-15 PROCEDURE — 93010 ELECTROCARDIOGRAM REPORT: CPT

## 2023-11-15 PROCEDURE — 73630 X-RAY EXAM OF FOOT: CPT | Mod: 26,RT

## 2023-11-15 PROCEDURE — 75635 CT ANGIO ABDOMINAL ARTERIES: CPT | Mod: 26,MA

## 2023-11-15 PROCEDURE — 73610 X-RAY EXAM OF ANKLE: CPT | Mod: 26,RT

## 2023-11-15 RX ORDER — SODIUM CHLORIDE 9 MG/ML
1000 INJECTION, SOLUTION INTRAVENOUS
Refills: 0 | Status: DISCONTINUED | OUTPATIENT
Start: 2023-11-15 | End: 2023-11-18

## 2023-11-15 RX ORDER — HEPARIN SODIUM 5000 [USP'U]/ML
2500 INJECTION INTRAVENOUS; SUBCUTANEOUS EVERY 6 HOURS
Refills: 0 | Status: DISCONTINUED | OUTPATIENT
Start: 2023-11-15 | End: 2023-11-16

## 2023-11-15 RX ORDER — ACETAMINOPHEN 500 MG
1000 TABLET ORAL ONCE
Refills: 0 | Status: COMPLETED | OUTPATIENT
Start: 2023-11-15 | End: 2023-11-15

## 2023-11-15 RX ORDER — SODIUM CHLORIDE 9 MG/ML
1000 INJECTION INTRAMUSCULAR; INTRAVENOUS; SUBCUTANEOUS ONCE
Refills: 0 | Status: COMPLETED | OUTPATIENT
Start: 2023-11-15 | End: 2023-11-15

## 2023-11-15 RX ORDER — HEPARIN SODIUM 5000 [USP'U]/ML
5000 INJECTION INTRAVENOUS; SUBCUTANEOUS EVERY 6 HOURS
Refills: 0 | Status: DISCONTINUED | OUTPATIENT
Start: 2023-11-15 | End: 2023-11-16

## 2023-11-15 RX ORDER — HEPARIN SODIUM 5000 [USP'U]/ML
INJECTION INTRAVENOUS; SUBCUTANEOUS
Qty: 25000 | Refills: 0 | Status: DISCONTINUED | OUTPATIENT
Start: 2023-11-15 | End: 2023-11-16

## 2023-11-15 RX ORDER — HEPARIN SODIUM 5000 [USP'U]/ML
5000 INJECTION INTRAVENOUS; SUBCUTANEOUS ONCE
Refills: 0 | Status: COMPLETED | OUTPATIENT
Start: 2023-11-15 | End: 2023-11-15

## 2023-11-15 RX ORDER — AMLODIPINE BESYLATE 2.5 MG/1
5 TABLET ORAL DAILY
Refills: 0 | Status: DISCONTINUED | OUTPATIENT
Start: 2023-11-15 | End: 2023-11-19

## 2023-11-15 RX ADMIN — HEPARIN SODIUM 5000 UNIT(S): 5000 INJECTION INTRAVENOUS; SUBCUTANEOUS at 16:57

## 2023-11-15 RX ADMIN — Medication 400 MILLIGRAM(S): at 14:12

## 2023-11-15 RX ADMIN — SODIUM CHLORIDE 150 MILLILITER(S): 9 INJECTION, SOLUTION INTRAVENOUS at 22:26

## 2023-11-15 RX ADMIN — Medication 1000 MILLIGRAM(S): at 14:27

## 2023-11-15 RX ADMIN — SODIUM CHLORIDE 1000 MILLILITER(S): 9 INJECTION INTRAMUSCULAR; INTRAVENOUS; SUBCUTANEOUS at 18:30

## 2023-11-15 RX ADMIN — HEPARIN SODIUM 1100 UNIT(S)/HR: 5000 INJECTION INTRAVENOUS; SUBCUTANEOUS at 16:58

## 2023-11-15 NOTE — H&P ADULT - HISTORY OF PRESENT ILLNESS
62 y/o male with pmhx htn, dm, hld  presents to the ED sent in from rehab center for right foot discoloration and pain. Patient states that he was discharged about 1 week ago from outside hospital after being treated for infection to right foot. He has been in rehab center for about 1 week. States today nurses looked at his foot and sent him to [facility] ED for evaluation. Patient has not been on any abx for foot while in rehab.   No hx  fevers, chills, chest pain, cough, n/v/d.

## 2023-11-15 NOTE — CONSULT NOTE ADULT - ASSESSMENT
63M presents with right foot partial thickness dry gangrene digits 1-5, dorsal medial midfoot to anterior medial ankle ischemic changes  - Patient seen and evaluated  - Afebrile, WBC 9.25, ESR pending, CRP 12.1  - Right foot partial thickness dry gangrene digits 1-5, dorsal medial midfoot to anterior medial ankle ischemic changes, no pus, no tracking or tunneling, no malodor, no bogginess or fluctuance.   - Right lower extremity xray: no OM, no gas  - No culture 2/2 like contain skin contaminant  - Recommend admit to medicine  - Recommend RAMYA/ PVR  - Recommend vascular consult  - Strict precaution with Z-flows to be worn at all times when in bed or resting in chair  - Pod plan local wound care pending vascular recs  - Discussed with attending

## 2023-11-15 NOTE — ED ADULT NURSE NOTE - OBJECTIVE STATEMENT
62 y/o M w/ PMH of HTN, DM, HLD, presents to the ED from rehab center w/ right foot discoloration and pain. Pt reports that he was d/c from hospital for right foot infection. Pt states that he had had not walking for "a long time". Pt denies any fevers, chills, chest pain, cough, n/v/d. Pt A&Ox3 gross neuro intact, lungs cta bilaterally, no difficulty speaking in complete sentences, s1s2 heart sounds heard, abdomen soft nontender nondistended, right foot wound, discolored, cold to touch, no pulse palpated in right foot. MD aware and at bedside assessing pt.

## 2023-11-15 NOTE — CONSULT NOTE ADULT - SUBJECTIVE AND OBJECTIVE BOX
Patient is a 63y old  Male who presents with a chief complaint of     HPI:  62 y/o male with PMHx of HTN, DM, HLD, presents to the ED sent in from rehab center for right foot discoloration and pain. Patient states that he was discharged about 1 week ago from outside hospital after being treated for infection to right foot. He has been in rehab center for about 1 week. States today nurses looked at his foot and sent him to [facility] ED for evaluation. Patient has not been on any abx for foot while in rehab. He is unsure if he was given anything for pain today. He denies any fevers, chills, chest pain, cough, n/v/d.    PAST MEDICAL & SURGICAL HISTORY:  Hypertension      Diabetes          MEDICATIONS  (STANDING):  acetaminophen   IVPB .. 1000 milliGRAM(s) IV Intermittent Once    MEDICATIONS  (PRN):      Allergies    penicillin (Anaphylaxis)    Intolerances        VITALS:    Vital Signs Last 24 Hrs  T(C): 36.5 (15 Nov 2023 11:20), Max: 36.5 (15 Nov 2023 11:20)  T(F): 97.7 (15 Nov 2023 11:20), Max: 97.7 (15 Nov 2023 11:20)  HR: 93 (15 Nov 2023 11:20) (93 - 93)  BP: 165/87 (15 Nov 2023 11:20) (165/87 - 165/87)  BP(mean): --  RR: 18 (15 Nov 2023 11:20) (18 - 18)  SpO2: 96% (15 Nov 2023 11:20) (96% - 96%)    Parameters below as of 15 Nov 2023 11:20  Patient On (Oxygen Delivery Method): room air        LABS:                CAPILLARY BLOOD GLUCOSE              LOWER EXTREMITY PHYSICAL EXAM:    Vascular: DP/PT 0/4, B/L, CFT >3 seconds B/L, Temperature gradient warm to cold on R, warm to cool on L.   Neuro: Epicritic sensation intact to the level of digits, B/L.  Musculoskeletal/Ortho: unremarkable  Skin: Right foot partial thickness dry gangrene digits 1-5, dorsal medial midfoot to anterior medial ankle ischemic changes, no pus, no tracking or tunneling, no malodor, no bogginess or fluctuance.        RADIOLOGY & ADDITIONAL STUDIES:     Patient is a 63y old  Male who presents with a chief complaint of     HPI:  62 y/o male with PMHx of HTN, DM, HLD, presents to the ED sent in from rehab center for right foot discoloration and pain. Patient states that he was discharged about 1 week ago from outside hospital after being treated for infection to right foot. He has been in rehab center for about 1 week. States today nurses looked at his foot and sent him to [facility] ED for evaluation. Patient has not been on any abx for foot while in rehab. He is unsure if he was given anything for pain today. He denies any fevers, chills, chest pain, cough, n/v/d.    PAST MEDICAL & SURGICAL HISTORY:  Hypertension      Diabetes          MEDICATIONS  (STANDING):  acetaminophen   IVPB .. 1000 milliGRAM(s) IV Intermittent Once    MEDICATIONS  (PRN):      Allergies    penicillin (Anaphylaxis)    Intolerances        VITALS:    Vital Signs Last 24 Hrs  T(C): 36.5 (15 Nov 2023 11:20), Max: 36.5 (15 Nov 2023 11:20)  T(F): 97.7 (15 Nov 2023 11:20), Max: 97.7 (15 Nov 2023 11:20)  HR: 93 (15 Nov 2023 11:20) (93 - 93)  BP: 165/87 (15 Nov 2023 11:20) (165/87 - 165/87)  BP(mean): --  RR: 18 (15 Nov 2023 11:20) (18 - 18)  SpO2: 96% (15 Nov 2023 11:20) (96% - 96%)    Parameters below as of 15 Nov 2023 11:20  Patient On (Oxygen Delivery Method): room air        LABS:                CAPILLARY BLOOD GLUCOSE              LOWER EXTREMITY PHYSICAL EXAM:    Vascular: DP/PT 0/4, B/L, CFT >3 seconds B/L, Temperature gradient warm to cold on R, warm to cool on L.   Neuro: Epicritic sensation intact to the level of digits, B/L.  Musculoskeletal/Ortho: unremarkable  Skin: Right foot partial thickness dry gangrene digits 1-5, dorsal medial midfoot to anterior medial ankle ischemic changes, no pus, no tracking or tunneling, no malodor, no bogginess or fluctuance.        RADIOLOGY & ADDITIONAL STUDIES:  < from: Xray Ankle Complete 3 Views, Right (11.15.23 @ 12:35) >  ACC: [id number] EXAM:  XR ANKLE COMP MIN 3 VIEWS RT   ORDERED BY: BRUNO CARNEY     ACC: [id number] EXAM:  XR FOOT COMP MIN 3 VIEWS RT   ORDERED BY: BRUNO CARNEY     PROCEDURE DATE:  11/15/2023          INTERPRETATION:  CLINICAL INFORMATION: For evaluation of osteomyelitis.    COMPARISON: Radiographs dated 3/23/2017.    TECHNIQUE: 3 views of the right ankle, 3 nonweightbearing views of the   right foot    FINDINGS:  No acute fracture or dislocation.  No definite focal cortical erosion or aggressive periosteal reaction.  Moderate arthrosis at the MCP joint of the great toe.  Joint spaces are grossly anatomic in alignment.  Bony mineralization within normal limits.  No aggressive osseous neoplasm.  No tracking soft tissue emphysema.  No radiopaque foreign body.    IMPRESSION:  No convincing plain radiographic evidence for acute osteomyelitis.    < end of copied text >

## 2023-11-15 NOTE — ED PROVIDER NOTE - CRITICAL CARE ATTENDING CONTRIBUTION TO CARE
Attending Statement: This was a shared visit with the ERIC. I reviewed and verified the documentation and independently performed the documented:     Medical Decision Making.    see mdm

## 2023-11-15 NOTE — ED ADULT NURSE NOTE - NSFALLRISKINTERV_ED_ALL_ED

## 2023-11-15 NOTE — CONSULT NOTE ADULT - ASSESSMENT
63M PMHx HTN, DM. HLD, presents to ED from rehab for acute limb ischemia. Patient was discharged about 1 week ago from OSH to rehab for conservative medical treatment of foot infection and now brought to ED for increased pain and acute discoloration of foot. Does not weightbare on right leg and has not ambulated in months.     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   VBG notable for elevated lactated of 2.1    Vascular consulted for evaluation of acute limb ischemia       Plan:  - Extensive discussion had with patient about intervention options. CTA findings of occlusive RLE arteries with no viability and due to patient's non-ambulating status, the most effective and safest intervention at this time offered is BKA.   - Risks and benefits discussed with the patient of proceeding with the intervention as well as risks of not proceeding with the procedure.   - Patient adamently refusing any surgical intervention and understands the risks of doing so.   - Recommend medicine admission   - recommend wound care consult  - recommend ID consult  - recommend continuing hep gtt  - f/u podiatry local wound care recommendations  - vascular surgery to follow    Plan discussed with and approved by fellow on behalf of attending on call, Dr. Sylvia Villaseñor MD PGY-3    Vascular surgery   [id number]  63M PMHx HTN, DM. HLD, presents to ED from rehab for acute limb ischemia. Patient was discharged about 1 week ago from OSH to rehab for conservative medical treatment of foot infection and now brought to ED for increased pain and acute discoloration of foot. Does not weightbare on right leg and has not ambulated in months.     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   VBG notable for elevated lactated of 2.1    Vascular consulted for evaluation of acute limb ischemia       Plan:  - Extensive discussion had with patient about intervention options. CTA findings of occlusive RLE arteries with the most effective and safest intervention at this time offered is BKA.   Asymptomatic from left iliac occlusion   - Risks and benefits discussed with the patient of proceeding with the intervention as well as risks of not proceeding with the procedure.   - Patient adamantly refusing any amputation tonight and understands the risks of doing so.   - Recommend medicine admission   - recommend wound care consult  - recommend ID consult  - recommend continuing hep gtt  - f/u podiatry local wound care recommendations  - vascular surgery to follow    Plan discussed with and approved by fellow on behalf of attending on call, Dr. Sylvia Villaseñor MD PGY-3    Vascular surgery   [id number]

## 2023-11-15 NOTE — ED ADULT NURSE REASSESSMENT NOTE - NS ED NURSE REASSESS COMMENT FT1
Vascular MD at bedside educating pt about pt's condition and treatment plan. Pt verbalized understanding and continues to refused surgery. ED team made aware of situation.

## 2023-11-15 NOTE — CONSULT NOTE ADULT - SUBJECTIVE AND OBJECTIVE BOX
Vascular Surgery Consult  Consulting surgical team: Vascular Surgery  Consulting attending: Sylvia HARMON    HPI:   63M PMHx HTN, DM. HLD, presents to ED from rehab for acute limb ischemia. Patient was discharged about 1 weeks ago from OSH to rehab for conservative medical treatment of foot infection. In rehab, nurse noted acute significant color discoloration of right foot and patient was brought to ED. Patient states that he has had RLE pain for months and cannot ambulate or place any weight on the leg. Endorses significant pain but motor and sensory intact.     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   VBG notable for elevated lactated of 2.1    Vascular consulted for evaluation of acute limb ischemia     PAST MEDICAL HISTORY:  Hypertension  Diabetes      PAST SURGICAL HISTORY:      MEDICATIONS:  amLODIPine   Tablet 5 milliGRAM(s) Oral daily  heparin   Injectable 5000 Unit(s) IV Push every 6 hours PRN  heparin   Injectable 2500 Unit(s) IV Push every 6 hours PRN  heparin  Infusion.  Unit(s)/Hr IV Continuous <Continuous>  lactated ringers. 1000 milliLiter(s) IV Continuous <Continuous>  sodium chloride 0.9% Bolus 1000 milliLiter(s) IV Bolus once      ALLERGIES:  penicillin (Anaphylaxis)      VITALS & I/Os:  Vital Signs Last 24 Hrs  T(C): 36.5 (15 Nov 2023 17:47), Max: 36.5 (15 Nov 2023 11:20)  T(F): 97.7 (15 Nov 2023 11:20), Max: 97.7 (15 Nov 2023 11:20)  HR: 93 (15 Nov 2023 17:47) (93 - 93)  BP: 165/87 (15 Nov 2023 17:47) (165/87 - 165/87)  BP(mean): --  RR: 18 (15 Nov 2023 17:47) (18 - 18)  SpO2: 96% (15 Nov 2023 17:47) (96% - 96%)    Parameters below as of 15 Nov 2023 11:20  Patient On (Oxygen Delivery Method): room air      I&O's Summary    PHYSICAL EXAM:  General: No acute distress  Respiratory: Nonlabored  Cardiovascular: RRR  Abdominal: Soft, nondistended, nontender.   Extremities: right lower extremity: severe diffuse discoloration on whole right foot with blisters on first toe, and ball of foot;  Right foot partial thickness dry gangrene digits 1-5, dorsal medial midfoot to anterior medial ankle ischemic changes, no pus, no tracking or tunneling, no malodor, no bogginess or fluctuance.   right DP/PT/distal AT pulses nonpalpable/non dopplerable; dopplerable right popliteal signal and proximal AT signal; palpable right femoral arterial pulse  Left lower extremity: palpable femoral arterial, popliteal, AT/DP/PT pulses palpable       LABS:                        12.3   9.25  )-----------( 135      ( 15 Nov 2023 13:20 )             39.3     11-15    135  |  102  |  28<H>  ----------------------------<  232<H>  5.1   |  22  |  1.47<H>    Ca    8.7      15 Nov 2023 13:20    TPro  6.5  /  Alb  2.8<L>  /  TBili  0.4  /  DBili  x   /  AST  27  /  ALT  24  /  AlkPhos  73  11-15  Lactate:  11-15 @ 12:50  2.1  PT/INR - ( 15 Nov 2023 15:43 )   PT: 11.8 sec;   INR: 1.07 ratio     PTT - ( 15 Nov 2023 15:43 )  PTT:27.3 sec      Urinalysis Basic - ( 15 Nov 2023 13:20 )    Color: x / Appearance: x / SG: x / pH: x  Gluc: 232 mg/dL / Ketone: x  / Bili: x / Urobili: x   Blood: x / Protein: x / Nitrite: x   Leuk Esterase: x / RBC: x / WBC x   Sq Epi: x / Non Sq Epi: x / Bacteria: x    IMAGING:

## 2023-11-15 NOTE — ED PROVIDER NOTE - OBJECTIVE STATEMENT
64 y/o male with PMHx of HTN, DM, HLD, presents to the ED sent in from rehab center for right foot discoloration and pain. Patient states that he was discharged about 1 week ago from outside hospital after being treated for infection to right foot. He has been in rehab center for about 1 week. States today nurses looked at his foot and sent him to [facility] ED for evaluation. Patient has not been on any abx for foot while in rehab. He is unsure if he was given anything for pain today. He denies any fevers, chills, chest pain, cough, n/v/d.

## 2023-11-15 NOTE — ED PROVIDER NOTE - PHYSICAL EXAMINATION
CONSTITUTIONAL: Patient is awake, alert and oriented x 3. Patient is chronically ill appearing;   HEAD: NCAT  EYES: PERRL bilaterally,  ENT: Airway patent,   NECK: Supple,  LUNGS: CTA B/L,   HEART: RRR.+S1S2    ABDOMEN: Soft, non-tender to palpation throughout all four quadrants,   MSK: FROM upper and lower ext b/l,   SKIN: ulcer to dorsum of right foot with surrounding erythema/warmth which extends up to mid anterior shin, ecchymosis to entire great toe, partial 2nd and fifth toe,   NEURO: No focal deficits,

## 2023-11-15 NOTE — ED ADULT NURSE REASSESSMENT NOTE - NS ED NURSE REASSESS COMMENT FT1
2nd IV placed in right wrist, PA notified of need for coags. Coags kirti and sent to lab. PA consulted with Vascular MD, vascular md states okay to go ahead to give heparin if coags arent resulted in time. Pt transported to CT at 1600. Will medicate pt upon arrival back from CT

## 2023-11-15 NOTE — ED PROVIDER NOTE - CLINICAL SUMMARY MEDICAL DECISION MAKING FREE TEXT BOX
concern for acute limb ischemia. cta / heparin infusion / podiatry and vasc surg consult  poss infection, will await ischemia colvin first

## 2023-11-15 NOTE — H&P ADULT - ASSESSMENT
63 M pmhx htn, dm, hld presents to ED from rehab for acute limb ischemia.    Acute Limb  Ischemia   CTA A/P with b/l LE run-noff significant for right external iliac artery occlusion with no reconstitution of flow to the distal RLE. 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     Vascular Podiatry consulted   iv Heparin   Wound Care consult   ID consult   Patient refusing amputation.       DM HISS   A1C     HTN Home meds

## 2023-11-15 NOTE — CONSULT NOTE ADULT - ATTENDING COMMENTS
Patient seen/examined.     P/w ischemic right foot. Significant ischemic changes/wounds of right foot. Unable to move foot. Foot is insensate. Reports he was recently admitted at OSH, unclear what was done or recommended. CTA shows occluded right SFA/popliteal artery with severe chronic BLE atherosclerosis. Of note left iliac artery is also occluded. Patient denies any sensorimotor deficits on left side and denies any symptoms of arterial insufficiency. Discussed with patient that RLE is non-salvageable and recommended amputation (BKA vs AKA). Patient currently refusing any amputation. Continue care per primary team. Patient reports son Nik is next of kin and would make medical decisions if he is unable to do so.

## 2023-11-15 NOTE — ED PROVIDER NOTE - PROGRESS NOTE DETAILS
Spoke to podiatry resident. They are concerned for ischemic foot. Unable to obtain doopler pulses on right foot. Left foot pulses obtained. CTA ordered. CT notified to expedite scan. Heparin ordered. Vascular paged multiple times. Pending call back. Arlyn Disla PA-C Surgery at bedside. Arlyn Disla PA-C Pt to go back to CT for CTA abdominal aorta. Patient will exceed daily contrast load of 200. Ok to receive more IV contrast. ED attending aware and agrees with plan. Arlyn Disla PA-C Vascular at bedside. Initial CT did not cover abdominal aorta or proximal vasculature. Pt to go back to CT for CTA abdominal aorta with runoff. Patient will exceed daily contrast load of 200. Ok to receive more IV contrast. CT tech informed. Will give IV fluid bolus. ED attending aware and agrees with plan. Arlyn Disla PA-C Received call from vascular surgery. Plan to take pt to OR. Will admit. Arlyn Disla PA-C Vascular stated that patient is refusing surgery at this time. They recommended changing admission to medicine. Pt to be admitted to unattached. Spoke to Dr. Rivas who accepted admission. Admitting notified will change pt to medicine admission. Arlyn Disla PA-C

## 2023-11-16 DIAGNOSIS — D72.829 ELEVATED WHITE BLOOD CELL COUNT, UNSPECIFIED: ICD-10-CM

## 2023-11-16 DIAGNOSIS — I73.9 PERIPHERAL VASCULAR DISEASE, UNSPECIFIED: ICD-10-CM

## 2023-11-16 DIAGNOSIS — R78.81 BACTEREMIA: ICD-10-CM

## 2023-11-16 LAB
-  STAPHYLOCOCCUS EPIDERMIDIS, METHICILLIN RESISTANT: SIGNIFICANT CHANGE UP
-  STAPHYLOCOCCUS EPIDERMIDIS, METHICILLIN RESISTANT: SIGNIFICANT CHANGE UP
ANION GAP SERPL CALC-SCNC: 11 MMOL/L — SIGNIFICANT CHANGE UP (ref 5–17)
ANION GAP SERPL CALC-SCNC: 11 MMOL/L — SIGNIFICANT CHANGE UP (ref 5–17)
APTT BLD: 160.9 SEC — CRITICAL HIGH (ref 24.5–35.6)
APTT BLD: 160.9 SEC — CRITICAL HIGH (ref 24.5–35.6)
APTT BLD: 68.7 SEC — HIGH (ref 24.5–35.6)
APTT BLD: 68.7 SEC — HIGH (ref 24.5–35.6)
APTT BLD: 73.7 SEC — HIGH (ref 24.5–35.6)
APTT BLD: 73.7 SEC — HIGH (ref 24.5–35.6)
APTT BLD: 78.2 SEC — HIGH (ref 24.5–35.6)
APTT BLD: 78.2 SEC — HIGH (ref 24.5–35.6)
BUN SERPL-MCNC: 27 MG/DL — HIGH (ref 7–23)
BUN SERPL-MCNC: 27 MG/DL — HIGH (ref 7–23)
CALCIUM SERPL-MCNC: 8.1 MG/DL — LOW (ref 8.4–10.5)
CALCIUM SERPL-MCNC: 8.1 MG/DL — LOW (ref 8.4–10.5)
CHLORIDE SERPL-SCNC: 102 MMOL/L — SIGNIFICANT CHANGE UP (ref 96–108)
CHLORIDE SERPL-SCNC: 102 MMOL/L — SIGNIFICANT CHANGE UP (ref 96–108)
CO2 SERPL-SCNC: 19 MMOL/L — LOW (ref 22–31)
CO2 SERPL-SCNC: 19 MMOL/L — LOW (ref 22–31)
CREAT SERPL-MCNC: 1.92 MG/DL — HIGH (ref 0.5–1.3)
CREAT SERPL-MCNC: 1.92 MG/DL — HIGH (ref 0.5–1.3)
EGFR: 38 ML/MIN/1.73M2 — LOW
EGFR: 38 ML/MIN/1.73M2 — LOW
GLUCOSE BLDC GLUCOMTR-MCNC: 144 MG/DL — HIGH (ref 70–99)
GLUCOSE BLDC GLUCOMTR-MCNC: 144 MG/DL — HIGH (ref 70–99)
GLUCOSE BLDC GLUCOMTR-MCNC: 217 MG/DL — HIGH (ref 70–99)
GLUCOSE BLDC GLUCOMTR-MCNC: 217 MG/DL — HIGH (ref 70–99)
GLUCOSE BLDC GLUCOMTR-MCNC: 240 MG/DL — HIGH (ref 70–99)
GLUCOSE BLDC GLUCOMTR-MCNC: 240 MG/DL — HIGH (ref 70–99)
GLUCOSE BLDC GLUCOMTR-MCNC: 332 MG/DL — HIGH (ref 70–99)
GLUCOSE BLDC GLUCOMTR-MCNC: 332 MG/DL — HIGH (ref 70–99)
GLUCOSE SERPL-MCNC: 304 MG/DL — HIGH (ref 70–99)
GLUCOSE SERPL-MCNC: 304 MG/DL — HIGH (ref 70–99)
GRAM STN FLD: ABNORMAL
HCT VFR BLD CALC: 32.4 % — LOW (ref 39–50)
HCT VFR BLD CALC: 32.4 % — LOW (ref 39–50)
HCV AB S/CO SERPL IA: 0.11 S/CO — SIGNIFICANT CHANGE UP (ref 0–0.99)
HCV AB S/CO SERPL IA: 0.11 S/CO — SIGNIFICANT CHANGE UP (ref 0–0.99)
HCV AB SERPL-IMP: SIGNIFICANT CHANGE UP
HCV AB SERPL-IMP: SIGNIFICANT CHANGE UP
HGB BLD-MCNC: 10.2 G/DL — LOW (ref 13–17)
HGB BLD-MCNC: 10.2 G/DL — LOW (ref 13–17)
INR BLD: 1.16 RATIO — SIGNIFICANT CHANGE UP (ref 0.85–1.18)
INR BLD: 1.16 RATIO — SIGNIFICANT CHANGE UP (ref 0.85–1.18)
MCHC RBC-ENTMCNC: 24.8 PG — LOW (ref 27–34)
MCHC RBC-ENTMCNC: 24.8 PG — LOW (ref 27–34)
MCHC RBC-ENTMCNC: 31.5 GM/DL — LOW (ref 32–36)
MCHC RBC-ENTMCNC: 31.5 GM/DL — LOW (ref 32–36)
MCV RBC AUTO: 78.8 FL — LOW (ref 80–100)
MCV RBC AUTO: 78.8 FL — LOW (ref 80–100)
METHOD TYPE: SIGNIFICANT CHANGE UP
METHOD TYPE: SIGNIFICANT CHANGE UP
NRBC # BLD: 0 /100 WBCS — SIGNIFICANT CHANGE UP (ref 0–0)
NRBC # BLD: 0 /100 WBCS — SIGNIFICANT CHANGE UP (ref 0–0)
PLATELET # BLD AUTO: 140 K/UL — LOW (ref 150–400)
PLATELET # BLD AUTO: 140 K/UL — LOW (ref 150–400)
POTASSIUM SERPL-MCNC: 4.5 MMOL/L — SIGNIFICANT CHANGE UP (ref 3.5–5.3)
POTASSIUM SERPL-MCNC: 4.5 MMOL/L — SIGNIFICANT CHANGE UP (ref 3.5–5.3)
POTASSIUM SERPL-SCNC: 4.5 MMOL/L — SIGNIFICANT CHANGE UP (ref 3.5–5.3)
POTASSIUM SERPL-SCNC: 4.5 MMOL/L — SIGNIFICANT CHANGE UP (ref 3.5–5.3)
PROTHROM AB SERPL-ACNC: 12.7 SEC — SIGNIFICANT CHANGE UP (ref 9.5–13)
PROTHROM AB SERPL-ACNC: 12.7 SEC — SIGNIFICANT CHANGE UP (ref 9.5–13)
RBC # BLD: 4.11 M/UL — LOW (ref 4.2–5.8)
RBC # BLD: 4.11 M/UL — LOW (ref 4.2–5.8)
RBC # FLD: 16.6 % — HIGH (ref 10.3–14.5)
RBC # FLD: 16.6 % — HIGH (ref 10.3–14.5)
SODIUM SERPL-SCNC: 132 MMOL/L — LOW (ref 135–145)
SODIUM SERPL-SCNC: 132 MMOL/L — LOW (ref 135–145)
SPECIMEN SOURCE: SIGNIFICANT CHANGE UP
WBC # BLD: 13.91 K/UL — HIGH (ref 3.8–10.5)
WBC # BLD: 13.91 K/UL — HIGH (ref 3.8–10.5)
WBC # FLD AUTO: 13.91 K/UL — HIGH (ref 3.8–10.5)
WBC # FLD AUTO: 13.91 K/UL — HIGH (ref 3.8–10.5)

## 2023-11-16 PROCEDURE — 99254 IP/OBS CNSLTJ NEW/EST MOD 60: CPT

## 2023-11-16 PROCEDURE — 99232 SBSQ HOSP IP/OBS MODERATE 35: CPT

## 2023-11-16 PROCEDURE — 99222 1ST HOSP IP/OBS MODERATE 55: CPT

## 2023-11-16 RX ORDER — VANCOMYCIN HCL 1 G
1000 VIAL (EA) INTRAVENOUS EVERY 24 HOURS
Refills: 0 | Status: DISCONTINUED | OUTPATIENT
Start: 2023-11-17 | End: 2023-11-19

## 2023-11-16 RX ORDER — DEXTROSE 50 % IN WATER 50 %
15 SYRINGE (ML) INTRAVENOUS ONCE
Refills: 0 | Status: DISCONTINUED | OUTPATIENT
Start: 2023-11-16 | End: 2023-11-19

## 2023-11-16 RX ORDER — ACETAMINOPHEN 500 MG
650 TABLET ORAL EVERY 6 HOURS
Refills: 0 | Status: DISCONTINUED | OUTPATIENT
Start: 2023-11-16 | End: 2023-11-19

## 2023-11-16 RX ORDER — HEPARIN SODIUM 5000 [USP'U]/ML
5000 INJECTION INTRAVENOUS; SUBCUTANEOUS EVERY 6 HOURS
Refills: 0 | Status: DISCONTINUED | OUTPATIENT
Start: 2023-11-16 | End: 2023-11-19

## 2023-11-16 RX ORDER — GLUCAGON INJECTION, SOLUTION 0.5 MG/.1ML
1 INJECTION, SOLUTION SUBCUTANEOUS ONCE
Refills: 0 | Status: DISCONTINUED | OUTPATIENT
Start: 2023-11-16 | End: 2023-11-19

## 2023-11-16 RX ORDER — HEPARIN SODIUM 5000 [USP'U]/ML
2500 INJECTION INTRAVENOUS; SUBCUTANEOUS EVERY 6 HOURS
Refills: 0 | Status: DISCONTINUED | OUTPATIENT
Start: 2023-11-16 | End: 2023-11-19

## 2023-11-16 RX ORDER — SODIUM CHLORIDE 9 MG/ML
1000 INJECTION, SOLUTION INTRAVENOUS
Refills: 0 | Status: DISCONTINUED | OUTPATIENT
Start: 2023-11-16 | End: 2023-11-19

## 2023-11-16 RX ORDER — INSULIN LISPRO 100/ML
VIAL (ML) SUBCUTANEOUS
Refills: 0 | Status: DISCONTINUED | OUTPATIENT
Start: 2023-11-16 | End: 2023-11-19

## 2023-11-16 RX ORDER — HEPARIN SODIUM 5000 [USP'U]/ML
INJECTION INTRAVENOUS; SUBCUTANEOUS
Qty: 25000 | Refills: 0 | Status: DISCONTINUED | OUTPATIENT
Start: 2023-11-16 | End: 2023-11-19

## 2023-11-16 RX ORDER — INSULIN LISPRO 100/ML
VIAL (ML) SUBCUTANEOUS AT BEDTIME
Refills: 0 | Status: DISCONTINUED | OUTPATIENT
Start: 2023-11-16 | End: 2023-11-19

## 2023-11-16 RX ORDER — VANCOMYCIN HCL 1 G
VIAL (EA) INTRAVENOUS
Refills: 0 | Status: DISCONTINUED | OUTPATIENT
Start: 2023-11-16 | End: 2023-11-19

## 2023-11-16 RX ORDER — INFLUENZA VIRUS VACCINE 15; 15; 15; 15 UG/.5ML; UG/.5ML; UG/.5ML; UG/.5ML
0.5 SUSPENSION INTRAMUSCULAR ONCE
Refills: 0 | Status: DISCONTINUED | OUTPATIENT
Start: 2023-11-16 | End: 2023-12-08

## 2023-11-16 RX ORDER — CHLORHEXIDINE GLUCONATE 213 G/1000ML
1 SOLUTION TOPICAL DAILY
Refills: 0 | Status: DISCONTINUED | OUTPATIENT
Start: 2023-11-16 | End: 2023-11-19

## 2023-11-16 RX ORDER — DEXTROSE 50 % IN WATER 50 %
25 SYRINGE (ML) INTRAVENOUS ONCE
Refills: 0 | Status: DISCONTINUED | OUTPATIENT
Start: 2023-11-16 | End: 2023-11-19

## 2023-11-16 RX ORDER — DEXTROSE 50 % IN WATER 50 %
12.5 SYRINGE (ML) INTRAVENOUS ONCE
Refills: 0 | Status: DISCONTINUED | OUTPATIENT
Start: 2023-11-16 | End: 2023-11-19

## 2023-11-16 RX ORDER — VANCOMYCIN HCL 1 G
1000 VIAL (EA) INTRAVENOUS ONCE
Refills: 0 | Status: COMPLETED | OUTPATIENT
Start: 2023-11-16 | End: 2023-11-16

## 2023-11-16 RX ADMIN — HEPARIN SODIUM 1100 UNIT(S)/HR: 5000 INJECTION INTRAVENOUS; SUBCUTANEOUS at 08:13

## 2023-11-16 RX ADMIN — Medication 2: at 17:34

## 2023-11-16 RX ADMIN — CHLORHEXIDINE GLUCONATE 1 APPLICATION(S): 213 SOLUTION TOPICAL at 14:54

## 2023-11-16 RX ADMIN — AMLODIPINE BESYLATE 5 MILLIGRAM(S): 2.5 TABLET ORAL at 05:19

## 2023-11-16 RX ADMIN — Medication 4: at 10:14

## 2023-11-16 RX ADMIN — Medication 250 MILLIGRAM(S): at 17:01

## 2023-11-16 RX ADMIN — HEPARIN SODIUM 1100 UNIT(S)/HR: 5000 INJECTION INTRAVENOUS; SUBCUTANEOUS at 22:59

## 2023-11-16 RX ADMIN — HEPARIN SODIUM 1100 UNIT(S)/HR: 5000 INJECTION INTRAVENOUS; SUBCUTANEOUS at 15:47

## 2023-11-16 RX ADMIN — Medication 2: at 14:51

## 2023-11-16 RX ADMIN — Medication 650 MILLIGRAM(S): at 18:54

## 2023-11-16 RX ADMIN — Medication 650 MILLIGRAM(S): at 19:24

## 2023-11-16 RX ADMIN — HEPARIN SODIUM 1100 UNIT(S)/HR: 5000 INJECTION INTRAVENOUS; SUBCUTANEOUS at 05:19

## 2023-11-16 NOTE — PROGRESS NOTE ADULT - SUBJECTIVE AND OBJECTIVE BOX
Patient is a 63y old  Male who presents with a chief complaint of Rt foot pain (16 Nov 2023 16:31)      SUBJECTIVE / OVERNIGHT EVENTS: no events     MEDICATIONS  (STANDING):  amLODIPine   Tablet 5 milliGRAM(s) Oral daily  chlorhexidine 2% Cloths 1 Application(s) Topical daily  dextrose 5%. 1000 milliLiter(s) (50 mL/Hr) IV Continuous <Continuous>  dextrose 5%. 1000 milliLiter(s) (100 mL/Hr) IV Continuous <Continuous>  dextrose 50% Injectable 25 Gram(s) IV Push once  dextrose 50% Injectable 12.5 Gram(s) IV Push once  dextrose 50% Injectable 25 Gram(s) IV Push once  glucagon  Injectable 1 milliGRAM(s) IntraMuscular once  heparin  Infusion.  Unit(s)/Hr (11 mL/Hr) IV Continuous <Continuous>  influenza   Vaccine 0.5 milliLiter(s) IntraMuscular once  insulin lispro (ADMELOG) corrective regimen sliding scale   SubCutaneous three times a day before meals  insulin lispro (ADMELOG) corrective regimen sliding scale   SubCutaneous at bedtime  lactated ringers. 1000 milliLiter(s) (150 mL/Hr) IV Continuous <Continuous>  vancomycin  IVPB        MEDICATIONS  (PRN):  acetaminophen     Tablet .. 650 milliGRAM(s) Oral every 6 hours PRN Temp greater or equal to 38C (100.4F)  dextrose Oral Gel 15 Gram(s) Oral once PRN Blood Glucose LESS THAN 70 milliGRAM(s)/deciliter  heparin   Injectable 5000 Unit(s) IV Push every 6 hours PRN For aPTT less than 40  heparin   Injectable 2500 Unit(s) IV Push every 6 hours PRN For aPTT between 40 - 57      CAPILLARY BLOOD GLUCOSE      POCT Blood Glucose.: 144 mg/dL (16 Nov 2023 21:47)  POCT Blood Glucose.: 240 mg/dL (16 Nov 2023 17:30)  POCT Blood Glucose.: 217 mg/dL (16 Nov 2023 14:25)  POCT Blood Glucose.: 332 mg/dL (16 Nov 2023 09:29)    I&O's Summary    15 Nov 2023 07:01  -  16 Nov 2023 07:00  --------------------------------------------------------  IN: 1929 mL / OUT: 250 mL / NET: 1679 mL    16 Nov 2023 07:01  -  16 Nov 2023 22:26  --------------------------------------------------------  IN: 240 mL / OUT: 525 mL / NET: -285 mL      T(C): 37.3 (11-16-23 @ 21:13), Max: 38.1 (11-16-23 @ 17:02)  HR: 79 (11-16-23 @ 21:13) (79 - 102)  BP: 135/76 (11-16-23 @ 21:13) (135/76 - 155/83)  RR: 18 (11-16-23 @ 21:13) (18 - 18)  SpO2: 94% (11-16-23 @ 21:13) (94% - 94%)    PHYSICAL EXAM:  GENERAL: NAD, well-developed  HEAD:  Atraumatic, Normocephalic  EYES: EOMI, PERRLA, conjunctiva and sclera clear  NECK: Supple, No JVD  CHEST/LUNG: Clear to auscultation bilaterally; No wheeze  HEART: Regular rate and rhythm; No murmurs, rubs, or gallops  ABDOMEN: Soft, Nontender, Nondistended; Bowel sounds present  EXTREMITIES:  2+ Peripheral Pulses, No clubbing, cyanosis, or edema  PSYCH: AAOx3  NEUROLOGY: non-focal  SKIN: No rashes or lesions    LABS:                        10.2   13.91 )-----------( 140      ( 16 Nov 2023 07:43 )             32.4     11-16    132<L>  |  102  |  27<H>  ----------------------------<  304<H>  4.5   |  19<L>  |  1.92<H>    Ca    8.1<L>      16 Nov 2023 07:43    TPro  5.4<L>  /  Alb  2.5<L>  /  TBili  0.5  /  DBili  x   /  AST  30  /  ALT  20  /  AlkPhos  68  11-15    PT/INR - ( 15 Nov 2023 23:35 )   PT: 12.7 sec;   INR: 1.16 ratio         PTT - ( 16 Nov 2023 14:57 )  PTT:68.7 sec  CARDIAC MARKERS ( 15 Nov 2023 18:35 )  x     / x     / 371 U/L / x     / x          Urinalysis Basic - ( 16 Nov 2023 07:43 )    Color: x / Appearance: x / SG: x / pH: x  Gluc: 304 mg/dL / Ketone: x  / Bili: x / Urobili: x   Blood: x / Protein: x / Nitrite: x   Leuk Esterase: x / RBC: x / WBC x   Sq Epi: x / Non Sq Epi: x / Bacteria: x        RADIOLOGY & ADDITIONAL TESTS:    Imaging Personally Reviewed:    Consultant(s) Notes Reviewed:      Care Discussed with Consultants/Other Providers:

## 2023-11-16 NOTE — CONSULT NOTE ADULT - SUBJECTIVE AND OBJECTIVE BOX
NEPHROLOGY - NSN    Patient seen and examined.    HPI:  64 y/o male with pmhx htn, dm, hld  presents to the ED sent in from rehab center for right foot discoloration and pain. Patient states that he was discharged about 1 week ago from outside hospital after being treated for infection to right foot. He has been in rehab center for about 1 week. States today nurses looked at his foot and sent him to [facility] ED for evaluation. Patient has not been on any abx for foot while in rehab.   No hx  fevers, chills, chest pain, cough, n/v/d. (15 Nov 2023 18:28)      PAST MEDICAL & SURGICAL HISTORY:  Hypertension      Diabetes          MEDICATIONS  (STANDING):  amLODIPine   Tablet 5 milliGRAM(s) Oral daily  chlorhexidine 2% Cloths 1 Application(s) Topical daily  dextrose 5%. 1000 milliLiter(s) (50 mL/Hr) IV Continuous <Continuous>  dextrose 5%. 1000 milliLiter(s) (100 mL/Hr) IV Continuous <Continuous>  dextrose 50% Injectable 25 Gram(s) IV Push once  dextrose 50% Injectable 12.5 Gram(s) IV Push once  dextrose 50% Injectable 25 Gram(s) IV Push once  glucagon  Injectable 1 milliGRAM(s) IntraMuscular once  heparin  Infusion.  Unit(s)/Hr (11 mL/Hr) IV Continuous <Continuous>  influenza   Vaccine 0.5 milliLiter(s) IntraMuscular once  insulin lispro (ADMELOG) corrective regimen sliding scale   SubCutaneous three times a day before meals  insulin lispro (ADMELOG) corrective regimen sliding scale   SubCutaneous at bedtime  lactated ringers. 1000 milliLiter(s) (150 mL/Hr) IV Continuous <Continuous>      Allergies    penicillin (Anaphylaxis)    Intolerances        SOCIAL HISTORY:  Denies alcohol abuse, drug abuse or tobacco usage.     FAMILY HISTORY:  No pertinent family history in first degree relatives        VITALS:  T(C): 37.3 (11-16-23 @ 10:30), Max: 37.7 (11-16-23 @ 00:48)  HR: 97 (11-16-23 @ 10:30) (62 - 97)  BP: 151/86 (11-16-23 @ 10:30) (139/76 - 166/93)  RR: 18 (11-16-23 @ 10:30) (17 - 18)  SpO2: 94% (11-16-23 @ 10:30) (94% - 98%)    REVIEW OF SYSTEMS:  Denies any nausea, vomiting, diarrhea, fever or chills. Denies chest pain, SOB, focal weakness, hematuria or dysuria. Good oral intake and denies fatigue or weakness. All other pertinent systems are reviewed and are negative.    PHYSICAL EXAM:  Constitutional: NAD  HEENT: EOMI  Neck:  No JVD, supple   Respiratory: CTA B/L  Cardiovascular: S1 and S2, RRR  Gastrointestinal: + BS, soft, NT, ND  Extremities: No peripheral edema, + peripheral pulses  Neurological: A/O x 3, CN2-12 intact  Psychiatric: Normal mood, normal affect  : No Pacheco  Skin: No rashes, C/D/I  Access: Not applicable    I and O's:    11-15 @ 07:01  -  11-16 @ 07:00  --------------------------------------------------------  IN: 1929 mL / OUT: 250 mL / NET: 1679 mL    11-16 @ 07:01  -  11-16 @ 15:21  --------------------------------------------------------  IN: 240 mL / OUT: 250 mL / NET: -10 mL      Height (cm): 170.2 (11-15 @ 18:28)  Weight (kg): 64.7 (11-15 @ 18:28)  BMI (kg/m2): 22.3 (11-15 @ 18:28)  BSA (m2): 1.75 (11-15 @ 18:28)    LABS:                        10.2   13.91 )-----------( 140      ( 16 Nov 2023 07:43 )             32.4     11-16    132<L>  |  102  |  27<H>  ----------------------------<  304<H>  4.5   |  19<L>  |  1.92<H>    Ca    8.1<L>      16 Nov 2023 07:43    TPro  5.4<L>  /  Alb  2.5<L>  /  TBili  0.5  /  DBili  x   /  AST  30  /  ALT  20  /  AlkPhos  68  11-15      URINE:  Urinalysis Basic - ( 16 Nov 2023 07:43 )    Color: x / Appearance: x / SG: x / pH: x  Gluc: 304 mg/dL / Ketone: x  / Bili: x / Urobili: x   Blood: x / Protein: x / Nitrite: x   Leuk Esterase: x / RBC: x / WBC x   Sq Epi: x / Non Sq Epi: x / Bacteria: x        RADIOLOGY & ADDITIONAL STUDIES:     NEPHROLOGY - NSN    Patient seen and examined.    HPI:  62 y/o male with pmhx htn, dm, hld  presents to the ED sent in from rehab center for right foot discoloration and pain. Patient states that he was discharged about 1 week ago from outside hospital after being treated for infection to right foot. He has been in rehab center for about 1 week. States today nurses looked at his foot and sent him to [facility] ED for evaluation. Patient has not been on any abx for foot while in rehab.   No hx  fevers, chills, chest pain, cough, n/v/d. (15 Nov 2023 18:28)  He is unaware of any renal issues but was not the greatest historian   There is no hematuria or bubbles in the urine.  No history of NSAIDS or nephrolithisis.  The patient urinates once or twice in the night and there is no incontinence.  No family hx or renal disease or back pain.    No recent abx use.  No alleviating or aggravating factors with respect to the kidneys.   Creatinine went from 1.4 to 1.9 and renal eval was called       PAST MEDICAL & SURGICAL HISTORY:  Hypertension      Diabetes          MEDICATIONS  (STANDING):  amLODIPine   Tablet 5 milliGRAM(s) Oral daily  chlorhexidine 2% Cloths 1 Application(s) Topical daily  dextrose 5%. 1000 milliLiter(s) (50 mL/Hr) IV Continuous <Continuous>  dextrose 5%. 1000 milliLiter(s) (100 mL/Hr) IV Continuous <Continuous>  dextrose 50% Injectable 25 Gram(s) IV Push once  dextrose 50% Injectable 12.5 Gram(s) IV Push once  dextrose 50% Injectable 25 Gram(s) IV Push once  glucagon  Injectable 1 milliGRAM(s) IntraMuscular once  heparin  Infusion.  Unit(s)/Hr (11 mL/Hr) IV Continuous <Continuous>  influenza   Vaccine 0.5 milliLiter(s) IntraMuscular once  insulin lispro (ADMELOG) corrective regimen sliding scale   SubCutaneous three times a day before meals  insulin lispro (ADMELOG) corrective regimen sliding scale   SubCutaneous at bedtime  lactated ringers. 1000 milliLiter(s) (150 mL/Hr) IV Continuous <Continuous>      Allergies    penicillin (Anaphylaxis)    Intolerances        SOCIAL HISTORY:  Denies alcohol abuse, drug abuse or tobacco usage.     FAMILY HISTORY:  No pertinent family history in first degree relatives        VITALS:  T(C): 37.3 (11-16-23 @ 10:30), Max: 37.7 (11-16-23 @ 00:48)  HR: 97 (11-16-23 @ 10:30) (62 - 97)  BP: 151/86 (11-16-23 @ 10:30) (139/76 - 166/93)  RR: 18 (11-16-23 @ 10:30) (17 - 18)  SpO2: 94% (11-16-23 @ 10:30) (94% - 98%)    REVIEW OF SYSTEMS:   . Denies chest pain, SOB, focal weakness, hematuria or dysuria.+  fatigue or weakness. All other pertinent systems are reviewed and are negative.    PHYSICAL EXAM:  Constitutional: NAD  HEENT: EOMI  Neck:  No JVD, supple   Respiratory: CTA B/L  Cardiovascular: S1 and S2, RRR  Gastrointestinal: + BS, soft, NT, ND  Extremities: No peripheral edema, + peripheral pulses  + dry gangrene in foot(not opened)   Neurological: A/O x 3, CN2-12 intact  Psychiatric: Normal mood, normal affect  : No Pacheco  Skin: No rashes, C/D/I  Access: Not applicable    I and O's:    11-15 @ 07:01  -  11-16 @ 07:00  --------------------------------------------------------  IN: 1929 mL / OUT: 250 mL / NET: 1679 mL    11-16 @ 07:01  -  11-16 @ 15:21  --------------------------------------------------------  IN: 240 mL / OUT: 250 mL / NET: -10 mL      Height (cm): 170.2 (11-15 @ 18:28)  Weight (kg): 64.7 (11-15 @ 18:28)  BMI (kg/m2): 22.3 (11-15 @ 18:28)  BSA (m2): 1.75 (11-15 @ 18:28)    LABS:                        10.2   13.91 )-----------( 140      ( 16 Nov 2023 07:43 )             32.4     11-16    132<L>  |  102  |  27<H>  ----------------------------<  304<H>  4.5   |  19<L>  |  1.92<H>    Ca    8.1<L>      16 Nov 2023 07:43    TPro  5.4<L>  /  Alb  2.5<L>  /  TBili  0.5  /  DBili  x   /  AST  30  /  ALT  20  /  AlkPhos  68  11-15      URINE:  Urinalysis Basic - ( 16 Nov 2023 07:43 )    Color: x / Appearance: x / SG: x / pH: x  Gluc: 304 mg/dL / Ketone: x  / Bili: x / Urobili: x   Blood: x / Protein: x / Nitrite: x   Leuk Esterase: x / RBC: x / WBC x   Sq Epi: x / Non Sq Epi: x / Bacteria: x        RADIOLOGY & ADDITIONAL STUDIES:    < from: CT Angio Abd Aorta w/run-off w/ IV Cont (11.15.23 @ 18:17) >    ACC: [id number] EXAM:  CT ANGIO ABD AOR W RUN(W)AW IC   ORDERED BY: BRUNO CARNEY     PROCEDURE DATE:  11/15/2023          INTERPRETATION:  CLINICAL INFORMATION: Gangrenous right toes with   occluded distal superficial femoral artery and popliteal arteries on   recent CTA right lower extremity. Evaluate abdominal aorta and left lower   extremity vessels    COMPARISON: Same-day CT angiogram right lower extremity, CT abdomen   pelvis 11/1/2016.    CONTRAST/COMPLICATIONS:  IV Contrast: [id number] 125  cc administered 25 cc discarded  Oral Contrast: NONE  Complications: None reported at time of study completion    CT ANGIOGRAM ABDOMEN, PELVIS, AND LOWER EXTREMITIES:    PROCEDURE:  Initially, nonenhanced CT was obtained through the calves. Then,   following the rapid administration of intravenous contrast, CT   angiography was performed through the abdomen, pelvis, and lower   extremities down to the toes. Delayed images through the calves were also   obtained. Sagittal and coronal reformats as well as 3D reconstructions   were performed.    FINDINGS:    CENTRAL ARTERIAL SYSTEM:    Motion limited evaluation. No abdominal aortic aneurysm. Circumferential   calcified plaque and circumferential mural thrombus of the abdominal   aorta. Celiac trunk and proximal SMA demonstrate mild category plaque.   Mid/distal SMA cannot be assessed due to motion. MARITZA is small in caliber,   grossly patent. Bilateral renal arteries with minimal to mild category   disease. Central vein patency is notassessed on this study due to timing   of contrast.    Right common, external, and internal iliac arteries demonstrate diffuse   plaque resulting in mild-to-moderate category disease.    Chronic occlusion of the left common, external, and proximal internal and   iliac arteries, also seen on prior imaging from 2016. Reconstitution of   distal left internal iliac artery branches.    RIGHT LOWER EXTREMITY:    Right common femoral artery borderline mild to moderate category bulky   calcified plaque. Right deep femoral artery multifocal mild category   plaque.    Long segment right SFA and popliteal artery occlusion. Markedly limited   runoff of the right lower extremity, with minimal opacification of the   proximal trifurcation arteries on delayed phase. Mid and distal segments   of trifurcation arteries are not opacified on the delayed phase.    Asymmetric right lower extremity soft tissue edema. Correlate for any   signs and symptoms of acute right limb ischemia. Recommend vascular   surgery evaluation.    LEFT LOWER EXTREMITY:    Reconstituted left common femoral artery via collaterals with bulky mild   to moderate category plaque. Left deep femoral artery also with moderate   stenosis at origin secondary to mixed plaque.    Left superficial femoral artery moderate stenosis at origin secondary to   calcified plaque. Remainder of the left SFA demonstrates multifocal mild   category plaque. Left popliteal artery with multifocal mild category   plaque.    Left trifurcation arteries are heavily calcified, with occluded posterior   tibial artery. Anterior tibial and peroneal arteries demonstrate contrast   opacification to the distal leg. Peroneal arteries and plantar arteries   are not opacified on the delayed phase .    NONVASCULAR FINDINGS:    Small pleural effusions with adjacent compressive atelectasis of the lung   parenchyma. Liver, biliary tree, gallbladder, pancreas, and spleen are   unremarkable. Left greater than right adrenal thickening. Contrast   opacifies bilateral renal collecting systems, demonstrating mild   fullness. No overt hydronephrosis of either kidney. Wedge-shaped   hypoattenuating focus of the left kidney on image 190 series 7, is   concerning for renal infarct or focus of infection/pyelonephritis.  Additional scattered hypoattenuating foci in the kidneys are too small to   characterize. Urinary bladder is moderately distended. Droplet of air   within the bladder in nondependent location likely due to recent   instrumentation. Bladder is partially opacified with contrast. Prostate   is enlarged.    Stomach is underdistended. Small duodenal diverticulum. No small bowel   distention. Appendix is not obstructed. Mild stool burden of the colon   limits evaluation of the colonic mucosa. Small ascites. Mesenteric   haziness. No enlarged lymph nodes of the abdomen/pelvis. Diffuse soft   tissue edema. Degenerative changes of the bones. Again, soft tissue   erosion at the distal right first phalanx. Correlate with recent   radiographs.    IMPRESSION:    VASCULAR:    RIGHT:  -Long segment right femoropopliteal arterial occlusion. Markedly limited   runoff of the right lower extremity, with minimal opacification of the   proximal trifurcation arteries on delayed phase. Asymmetric right lower   extremity soft tissue edema. Correlate for any signs and symptoms of   acute right limb ischemia. Recommend vascular surgery evaluation.  -Borderline mild to moderate right CFA stenosis.  -Borderline mild to moderate right iliac artery disease.    LEFT:  -Chronically occluded left iliac arteries since 2016 CT with   reconstitution at the left CFA.  -Borderline mild to moderate category plaque at the left CFA. Moderate   category plaque at origins of the left SFA and DFA.  -Two vessel contrast opacification to the distal left leg via anterior   tibial and peroneal arteries. Arteries of the left foot are not opacified   on the delayed phase.    Dr. Casanova discussed these above findings with Dr. Josh Pichardo from   Surgery on 11/15/2023 at 8:05 PM, with read back.    NON-VASCULAR:    -Small bilateral pleural effusion with adjacent compressive atelectasis.  -Wedge-shaped hypoattenuating focus of the left kidney on image 190   series 7, is concerning for renal infarct or focus of   infection/pyelonephritis.  -Moderately distended urinary bladder and enlarged prostate which may   reflect outlet obstruction. Mild fullness of the renal collecting systems   without overt hydronephrosis.  -Again, soft tissue erosion at the distal right first phalanx. Correlate   with recent radiographs.    --- End of Report ---           DOMINIQUE GOTTI MD; Resident Radiologist  This document has been electronically signed.  JANUARY CASANOVA M.D., ATTENDING RADIOLOGIST  This document has been electronicallysigned. Nov 15 2023  8:06PM    < end of copied text >

## 2023-11-16 NOTE — CONSULT NOTE ADULT - ASSESSMENT
Suspect CNS in blood culture contaminant from single phlebotomy  LE changes do not look primarily infectious  Doubt cardioembolic disease in this setting (eg endocarditis)  Denies any symptoms PTA of infection  If there is infection in the foot, antibiotics will not get to the target given absent perfusion  Leukocytosis likely reactive to ischemia  Patient is in pain from ischemia  D/W patient, he's amenable now to talking with surgery again re: ISAIAS    Suggestions--  Vascular f/u  Defer additional antibiotics  Analgesia  D/W RN and left message for CHUCKY Robbins at the time of assessment.    Thank you for the courtesy of this referral.  Ayaz Morales MD  Attending Physician  [facility]  Division of Infectious Diseases  [phone]

## 2023-11-16 NOTE — CONSULT NOTE ADULT - ASSESSMENT
62 y/o male with pmhx htn, dm, hld  presents to the ED sent in from rehab center for right foot discoloration and pain  Hyponatremia   Metabolic acidosis   Wedge-shaped hypoattenuating focus of the left kidney concerning for renal infarct or focus of  infection/pyelonephritis.  Distended bladder without hydro  OLIVER following contrast    62 y/o male with pmhx htn, dm, hld  presents to the ED sent in from rehab center for right foot discoloration and pain  Hyponatremia   Metabolic acidosis   Wedge-shaped hypoattenuating focus of the left kidney concerning for renal infarct or focus of  infection/pyelonephritis.  Distended bladder without hydro  OLIVER following contrast   High grade bacteremia       1 Renal - Likely BHAKTI at present   Check ua and bladder scan   Eventual renal dopplers to assess possible infarct vs infection   2 Vasc-He just told me that he now agrees with surgery - DW Vasc attd for further discussion   3 ID + blood cx at present and will need surgery for source control  Vanco 1 gram ivss x 1     Prognosis is guarded.  To be clear he was refusing surgery up until right now   DW Hospitalist and Vasc surgery     >60 minutes spent on total encounter and more then 50% of the visit was spent counseling and/or coordinating care by the attending physician     [facility]   [facility]   [phone]

## 2023-11-16 NOTE — CONSULT NOTE ADULT - SUBJECTIVE AND OBJECTIVE BOX
[facility]  Division of Infectious Diseases  [phone]    AIXA LARSON  63y, Male  [id number]    HPI--  Seen earlier this afternoon. Patient not an especially forthcoming historian. As per HPI:  64 y/o male with pmhx htn, dm, hld  presents to the ED sent in from rehab center for right foot discoloration and pain. Patient states that he was discharged about 1 week ago from outside hospital after being treated for infection to right foot. He has been in rehab center for about 1 week. States today nurses looked at his foot and sent him to [facility] ED for evaluation. Patient has not been on any abx for foot while in rehab.   No hx  fevers, chills, chest pain, cough, n/v/d. (15 Nov 2023 18:28)    Patient now known to have occlusive vascular disease to the right leg.  At this point he had been refusing any amputation.  He has pain with any manipulation of the foot.  He denies fevers chills or rigors.  In retrospect he states the foot has been "bad" for about 2 months.    Subsequent to the assessment patient noted to have positive blood cultures initially in a single bottle and now 3 out of 4 bottles of coagulase-negative staph.    PMH/PSH--  Hypertension    Diabetes        Allergies--  penicillin (Anaphylaxis)      Medications--  Antibiotics: vancomycin  IVPB        Immunologic: influenza   Vaccine 0.5 milliLiter(s) IntraMuscular once    Other: amLODIPine   Tablet  chlorhexidine 2% Cloths  dextrose 5%.  dextrose 5%.  dextrose 50% Injectable  dextrose 50% Injectable  dextrose 50% Injectable  dextrose Oral Gel PRN  glucagon  Injectable  heparin   Injectable PRN  heparin   Injectable PRN  heparin  Infusion.  insulin lispro (ADMELOG) corrective regimen sliding scale  insulin lispro (ADMELOG) corrective regimen sliding scale  lactated ringers.    Antimicrobials last 90 days per EMR: MEDICATIONS  (STANDING):        Social History--  EtOH: denies active  Tobacco: denies active   Drug Use: denies active    Family/Marital History--  No pertinent family history in first degree relatives      Travel/Environmental/Occupational History:  Retired     Review of Systems:  A >=10-point review of systems was obtained.   Review of systems otherwise negative except as previously noted.    Physical Exam--  Vital Signs: T(F): 99.1 (11-16-23 @ 10:30), Max: 99.8 (11-16-23 @ 00:48)  HR: 97 (11-16-23 @ 10:30)  BP: 151/86 (11-16-23 @ 10:30)  RR: 18 (11-16-23 @ 10:30)  SpO2: 94% (11-16-23 @ 10:30)  Wt(kg): --  General: Nontoxic-appearing Male in no acute distress.  HEENT: Anicteric. Conjunctiva pink and moist. Oropharynx clear.  Neck: Not rigid. No sense of mass.  Nodes: None palpable.  Lungs: Diminished BS bilaterally without rales, wheezing or rhonchi  Heart: Regular rate and rhythm.   Abdomen: Bowel sounds present and normoactive. Soft. Nondistended. Nontender.   Extremities: RLE cool distal leg. Foot with cyanosis, nonacute looking ischemic changes with areas of eschar. Heel relatively spared. No drainage or malodor. Exquisitely tender with any manipulation of the foot.   Psychiatric: Appropriately distraught about status of his foot and leg.         Laboratory & Imaging Data--  CBC                        10.2   13.91 )-----------( 140      ( 16 Nov 2023 07:43 )             32.4       Chemistries  11-16    132<L>  |  102  |  27<H>  ----------------------------<  304<H>  4.5   |  19<L>  |  1.92<H>    Ca    8.1<L>      16 Nov 2023 07:43    TPro  5.4<L>  /  Alb  2.5<L>  /  TBili  0.5  /  DBili  x   /  AST  30  /  ALT  20  /  AlkPhos  68  11-15      Culture Data    Culture - Blood (collected 15 Nov 2023 12:50)  Source: .Blood Blood-Peripheral  Gram Stain (16 Nov 2023 16:29):    Growth in anaerobic bottle: Gram Positive Cocci in Clusters    Growth in aerobic bottle: Gram Positive Cocci in Clusters  Preliminary Report (16 Nov 2023 16:29):    Growth in anaerobic bottle: Gram Positive Cocci in Clusters    Growth in aerobic bottle: Gram Positive Cocci in Clusters    Culture - Blood (collected 15 Nov 2023 12:35)  Source: .Blood Blood-Peripheral  Gram Stain (16 Nov 2023 15:33):    Growth in aerobic bottle: Gram Positive Cocci in Clusters    Growth in anaerobic bottle: Gram Positive Cocci in Clusters  Preliminary Report (16 Nov 2023 15:34):    Growth in aerobic bottle: Gram Positive Cocci in Clusters    Growth in anaerobic bottle: Gram Positive Cocci in Clusters    Direct identification is available within approximately 3-5    hours either by Blood Panel Multiplexed PCR or Direct    MALDI-TOF. Details: [email or web address]  Organism: Blood Culture PCR (16 Nov 2023 15:52)  Organism: Blood Culture PCR (16 Nov 2023 15:52)    < from: CT Angio Abd Aorta w/run-off w/ IV Cont (11.15.23 @ 18:17) >  FINDINGS:    CENTRAL ARTERIAL SYSTEM:    Motion limited evaluation. No abdominal aortic aneurysm. Circumferential   calcified plaque and circumferential mural thrombus of the abdominal   aorta. Celiac trunk and proximal SMA demonstrate mild category plaque.   Mid/distal SMA cannot be assessed due to motion. MARITZA is small in caliber,   grossly patent. Bilateral renal arteries with minimal to mild category   disease. Central vein patency is notassessed on this study due to timing   of contrast.    Right common, external, and internal iliac arteries demonstrate diffuse   plaque resulting in mild-to-moderate category disease.    Chronic occlusion of the left common, external, and proximal internal and   iliac arteries, also seen on prior imaging from 2016. Reconstitution of   distal left internal iliac artery branches.    RIGHT LOWER EXTREMITY:    Right common femoral artery borderline mild to moderate category bulky   calcified plaque. Right deep femoral artery multifocal mild category   plaque.    Long segment right SFA and popliteal artery occlusion. Markedly limited   runoff of the right lower extremity, with minimal opacification of the   proximal trifurcation arteries on delayed phase. Mid and distal segments   of trifurcation arteries are not opacified on the delayed phase.    Asymmetric right lower extremity soft tissue edema. Correlate for any   signs and symptoms of acute right limb ischemia. Recommend vascular   surgery evaluation.    LEFT LOWER EXTREMITY:    Reconstituted left common femoral artery via collaterals with bulky mild   to moderate category plaque. Left deep femoral artery also with moderate   stenosis at origin secondary to mixed plaque.    Left superficial femoral artery moderate stenosis at origin secondary to   calcified plaque. Remainder of the left SFA demonstrates multifocal mild   category plaque. Left popliteal artery with multifocal mild category   plaque.    Left trifurcation arteries are heavily calcified, with occluded posterior   tibial artery. Anterior tibial and peroneal arteries demonstrate contrast   opacification to the distal leg. Peroneal arteries and plantar arteries   are not opacified on the delayed phase .    NONVASCULAR FINDINGS:    Small pleural effusions with adjacent compressive atelectasis of the lung   parenchyma. Liver, biliary tree, gallbladder, pancreas, and spleen are   unremarkable. Left greater than right adrenal thickening. Contrast   opacifies bilateral renal collecting systems, demonstrating mild   fullness. No overt hydronephrosis of either kidney. Wedge-shaped   hypoattenuating focus of the left kidney on image 190 series 7, is   concerning for renal infarct or focus of infection/pyelonephritis.  Additional scattered hypoattenuating foci in the kidneys are too small to   characterize. Urinary bladder is moderately distended. Droplet of air   within the bladder in nondependent location likely due to recent   instrumentation. Bladder is partially opacified with contrast. Prostate   is enlarged.    Stomach is underdistended. Small duodenal diverticulum. No small bowel   distention. Appendix is not obstructed. Mild stool burden of the colon   limits evaluation of the colonic mucosa. Small ascites. Mesenteric   haziness. No enlarged lymph nodes of the abdomen/pelvis. Diffuse soft   tissue edema. Degenerative changes of the bones. Again, soft tissue   erosion at the distal right first phalanx. Correlate with recent   radiographs.    IMPRESSION:    VASCULAR:    RIGHT:  -Long segment right femoropopliteal arterial occlusion. Markedly limited   runoff of the right lower extremity, with minimal opacification of the   proximal trifurcation arteries on delayed phase. Asymmetric right lower   extremity soft tissue edema. Correlate for any signs and symptoms of   acute right limb ischemia. Recommend vascular surgery evaluation.  -Borderline mild to moderate right CFA stenosis.  -Borderline mild to moderate right iliac artery disease.    LEFT:  -Chronically occluded left iliac arteries since 2016 CT with   reconstitution at the left CFA.  -Borderline mild to moderate category plaque at the left CFA. Moderate   category plaque at origins of the left SFA and DFA.  -Two vessel contrast opacification to the distal left leg via anterior   tibial and peroneal arteries. Arteries of the left foot are not opacified   on the delayed phase.    Dr. Casanova discussed these above findings with Dr. Josh Pichardo from   Surgery on 11/15/2023 at 8:05 PM, with read back.    NON-VASCULAR:    -Small bilateral pleural effusion with adjacent compressive atelectasis.  -Wedge-shaped hypoattenuating focus of the left kidney on image 190   series 7, is concerning for renal infarct or focus of   infection/pyelonephritis.  -Moderately distended urinary bladder and enlarged prostate which may   reflect outlet obstruction. Mild fullness of the renal collecting systems   without overt hydronephrosis.  -Again, soft tissue erosion at the distal right first phalanx. Correlate   with recent radiographs.    --- End of Report ---    < end of copied text >

## 2023-11-16 NOTE — PROGRESS NOTE ADULT - ASSESSMENT
63M PMHx HTN, DM. HLD, presents to ED from rehab for acute limb ischemia. Patient was discharged about 1 week ago from OSH to rehab for conservative medical treatment of foot infection and now brought to ED for increased pain and acute discoloration of foot. Does not weightbare on right leg and has not ambulated in months.     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   VBG notable for elevated lactated of 2.1    Vascular consulted for evaluation of acute limb ischemia       Plan:  - Patient adamantly refusing any amputation tonight and understands the risks of doing so.   - recommend wound care consult  - recommend ID consult  - recommend continuing hep gtt  - f/u podiatry local wound care recommendations  - vascular surgery to follow, page with any questions/concerns      Vascular surgery   [id number]

## 2023-11-16 NOTE — PATIENT PROFILE ADULT - FALL HARM RISK - HARM RISK INTERVENTIONS
Assistance with ambulation/Assistance OOB with selected safe patient handling equipment/Communicate Risk of Fall with Harm to all staff/Discuss with provider need for PT consult/Monitor gait and stability/Reinforce activity limits and safety measures with patient and family/Tailored Fall Risk Interventions/Visual Cue: Yellow wristband and red socks/Bed in lowest position, wheels locked, appropriate side rails in place/Call bell, personal items and telephone in reach/Instruct patient to call for assistance before getting out of bed or chair/Non-slip footwear when patient is out of bed/[place] to call system/Physically safe environment - no spills, clutter or unnecessary equipment/Purposeful Proactive Rounding/Room/bathroom lighting operational, light cord in reach

## 2023-11-16 NOTE — CHART NOTE - NSCHARTNOTEFT_GEN_A_CORE
Wound Care Team Note:    Request for wound care consult for foot/toe wound received and referred to Podiatry. Please refer to Podiatry for management. Will not follow.    Maria Mendoza NP-C, CWOCN via TEAMS

## 2023-11-16 NOTE — PROGRESS NOTE ADULT - ASSESSMENT
63 M pmhx htn, dm, hld presents to ED from rehab for acute limb ischemia.    Acute Limb  Ischemia   CTA A/P with b/l LE run-noff significant for right external iliac artery occlusion with no reconstitution of flow to the distal RLE. 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     Vascular Podiatry consulted   iv Heparin   Wound Care consult   ID consult appreciated   Patient agreeing for Amputation   Moniotr off abx   CONS likely contaminant       DM HISS   A1C     HTN Home meds

## 2023-11-16 NOTE — PROGRESS NOTE ADULT - SUBJECTIVE AND OBJECTIVE BOX
SURGERY PROGRESS NOTE    Other specified disorders of circulatory system        AIXA LARSON  |  [id number]        S: CINDI overnight. Pt seen and evaluated bedside this AM. Pt resting comfortably on exam.     MEDICATIONS  (STANDING):  amLODIPine   Tablet 5 milliGRAM(s) Oral daily  dextrose 5%. 1000 milliLiter(s) (50 mL/Hr) IV Continuous <Continuous>  dextrose 5%. 1000 milliLiter(s) (100 mL/Hr) IV Continuous <Continuous>  dextrose 50% Injectable 25 Gram(s) IV Push once  dextrose 50% Injectable 12.5 Gram(s) IV Push once  dextrose 50% Injectable 25 Gram(s) IV Push once  glucagon  Injectable 1 milliGRAM(s) IntraMuscular once  heparin  Infusion.  Unit(s)/Hr (11 mL/Hr) IV Continuous <Continuous>  influenza   Vaccine 0.5 milliLiter(s) IntraMuscular once  insulin lispro (ADMELOG) corrective regimen sliding scale   SubCutaneous three times a day before meals  insulin lispro (ADMELOG) corrective regimen sliding scale   SubCutaneous at bedtime  lactated ringers. 1000 milliLiter(s) (150 mL/Hr) IV Continuous <Continuous>    MEDICATIONS  (PRN):  dextrose Oral Gel 15 Gram(s) Oral once PRN Blood Glucose LESS THAN 70 milliGRAM(s)/deciliter  heparin   Injectable 5000 Unit(s) IV Push every 6 hours PRN For aPTT less than 40  heparin   Injectable 2500 Unit(s) IV Push every 6 hours PRN For aPTT between 40 - 57      O:   Vital Signs Last 24 Hrs  T(C): 37.2 (16 Nov 2023 05:16), Max: 37.7 (16 Nov 2023 00:48)  T(F): 99 (16 Nov 2023 05:16), Max: 99.8 (16 Nov 2023 00:48)  HR: 95 (16 Nov 2023 05:16) (62 - 96)  BP: 152/86 (16 Nov 2023 05:16) (139/76 - 166/93)  BP(mean): 103 (15 Nov 2023 18:28) (103 - 103)  RR: 18 (16 Nov 2023 05:16) (17 - 20)  SpO2: 95% (16 Nov 2023 05:16) (95% - 99%)    Parameters below as of 16 Nov 2023 05:16  Patient On (Oxygen Delivery Method): room air        PHYSICAL EXAM:  General: No acute distress  Respiratory: Nonlabored  Cardiovascular: RRR  Abdominal: Soft, nondistended, nontender.   Extremities: right lower extremity: severe diffuse discoloration on whole right foot with blisters on first toe, and ball of foot;  Right foot partial thickness dry gangrene digits 1-5, dorsal medial midfoot to anterior medial ankle ischemic changes, no pus, no tracking or tunneling, no malodor, no bogginess or fluctuance.   right DP/PT/distal AT pulses nonpalpable/non dopplerable; dopplerable right popliteal signal and proximal AT signal; palpable right femoral arterial pulse  Left lower extremity: palpable femoral arterial, popliteal, AT/DP/PT pulses palpable                           12.3   9.25  )-----------( 135      ( 15 Nov 2023 13:20 )             39.3     11-15    134<L>  |  102  |  28<H>  ----------------------------<  236<H>  5.1   |  19<L>  |  1.67<H>    Ca    8.3<L>      15 Nov 2023 18:35    TPro  5.4<L>  /  Alb  2.5<L>  /  TBili  0.5  /  DBili  x   /  AST  30  /  ALT  20  /  AlkPhos  68  11-15        11-15-23 @ 07:01  -  11-16-23 @ 07:00  --------------------------------------------------------  IN: 1929 mL / OUT: 250 mL / NET: 1679 mL        IMAGING STUDIES:

## 2023-11-17 ENCOUNTER — TRANSCRIPTION ENCOUNTER (OUTPATIENT)
Age: 63
End: 2023-11-17

## 2023-11-17 LAB
ALBUMIN SERPL ELPH-MCNC: 2.1 G/DL — LOW (ref 3.3–5)
ALBUMIN SERPL ELPH-MCNC: 2.1 G/DL — LOW (ref 3.3–5)
ALP SERPL-CCNC: 79 U/L — SIGNIFICANT CHANGE UP (ref 40–120)
ALP SERPL-CCNC: 79 U/L — SIGNIFICANT CHANGE UP (ref 40–120)
ALT FLD-CCNC: 25 U/L — SIGNIFICANT CHANGE UP (ref 10–45)
ALT FLD-CCNC: 25 U/L — SIGNIFICANT CHANGE UP (ref 10–45)
ANION GAP SERPL CALC-SCNC: 14 MMOL/L — SIGNIFICANT CHANGE UP (ref 5–17)
ANION GAP SERPL CALC-SCNC: 14 MMOL/L — SIGNIFICANT CHANGE UP (ref 5–17)
APPEARANCE UR: ABNORMAL
APPEARANCE UR: ABNORMAL
APTT BLD: 73.6 SEC — HIGH (ref 24.5–35.6)
APTT BLD: 73.6 SEC — HIGH (ref 24.5–35.6)
AST SERPL-CCNC: 42 U/L — HIGH (ref 10–40)
AST SERPL-CCNC: 42 U/L — HIGH (ref 10–40)
BACTERIA # UR AUTO: ABNORMAL /HPF
BACTERIA # UR AUTO: ABNORMAL /HPF
BILIRUB SERPL-MCNC: 0.5 MG/DL — SIGNIFICANT CHANGE UP (ref 0.2–1.2)
BILIRUB SERPL-MCNC: 0.5 MG/DL — SIGNIFICANT CHANGE UP (ref 0.2–1.2)
BILIRUB UR-MCNC: NEGATIVE — SIGNIFICANT CHANGE UP
BILIRUB UR-MCNC: NEGATIVE — SIGNIFICANT CHANGE UP
BUN SERPL-MCNC: 36 MG/DL — HIGH (ref 7–23)
BUN SERPL-MCNC: 36 MG/DL — HIGH (ref 7–23)
CALCIUM SERPL-MCNC: 7.5 MG/DL — LOW (ref 8.4–10.5)
CALCIUM SERPL-MCNC: 7.5 MG/DL — LOW (ref 8.4–10.5)
CAST: 2 /LPF — SIGNIFICANT CHANGE UP (ref 0–4)
CAST: 2 /LPF — SIGNIFICANT CHANGE UP (ref 0–4)
CHLORIDE SERPL-SCNC: 99 MMOL/L — SIGNIFICANT CHANGE UP (ref 96–108)
CHLORIDE SERPL-SCNC: 99 MMOL/L — SIGNIFICANT CHANGE UP (ref 96–108)
CO2 SERPL-SCNC: 17 MMOL/L — LOW (ref 22–31)
CO2 SERPL-SCNC: 17 MMOL/L — LOW (ref 22–31)
COLOR SPEC: YELLOW — SIGNIFICANT CHANGE UP
COLOR SPEC: YELLOW — SIGNIFICANT CHANGE UP
CREAT ?TM UR-MCNC: 47 MG/DL — SIGNIFICANT CHANGE UP
CREAT ?TM UR-MCNC: 47 MG/DL — SIGNIFICANT CHANGE UP
CREAT SERPL-MCNC: 3.35 MG/DL — HIGH (ref 0.5–1.3)
CREAT SERPL-MCNC: 3.35 MG/DL — HIGH (ref 0.5–1.3)
CULTURE RESULTS: ABNORMAL
CULTURE RESULTS: ABNORMAL
DIFF PNL FLD: ABNORMAL
DIFF PNL FLD: ABNORMAL
EGFR: 20 ML/MIN/1.73M2 — LOW
EGFR: 20 ML/MIN/1.73M2 — LOW
GLUCOSE BLDC GLUCOMTR-MCNC: 128 MG/DL — HIGH (ref 70–99)
GLUCOSE BLDC GLUCOMTR-MCNC: 128 MG/DL — HIGH (ref 70–99)
GLUCOSE BLDC GLUCOMTR-MCNC: 163 MG/DL — HIGH (ref 70–99)
GLUCOSE BLDC GLUCOMTR-MCNC: 163 MG/DL — HIGH (ref 70–99)
GLUCOSE BLDC GLUCOMTR-MCNC: 215 MG/DL — HIGH (ref 70–99)
GLUCOSE BLDC GLUCOMTR-MCNC: 215 MG/DL — HIGH (ref 70–99)
GLUCOSE BLDC GLUCOMTR-MCNC: 288 MG/DL — HIGH (ref 70–99)
GLUCOSE BLDC GLUCOMTR-MCNC: 288 MG/DL — HIGH (ref 70–99)
GLUCOSE SERPL-MCNC: 138 MG/DL — HIGH (ref 70–99)
GLUCOSE SERPL-MCNC: 138 MG/DL — HIGH (ref 70–99)
GLUCOSE UR QL: NEGATIVE MG/DL — SIGNIFICANT CHANGE UP
GLUCOSE UR QL: NEGATIVE MG/DL — SIGNIFICANT CHANGE UP
GRAM STN FLD: ABNORMAL
GRAM STN FLD: ABNORMAL
HCT VFR BLD CALC: 29 % — LOW (ref 39–50)
HCT VFR BLD CALC: 29 % — LOW (ref 39–50)
HGB BLD-MCNC: 9.2 G/DL — LOW (ref 13–17)
HGB BLD-MCNC: 9.2 G/DL — LOW (ref 13–17)
KETONES UR-MCNC: NEGATIVE MG/DL — SIGNIFICANT CHANGE UP
KETONES UR-MCNC: NEGATIVE MG/DL — SIGNIFICANT CHANGE UP
LEUKOCYTE ESTERASE UR-ACNC: ABNORMAL
LEUKOCYTE ESTERASE UR-ACNC: ABNORMAL
MCHC RBC-ENTMCNC: 24.6 PG — LOW (ref 27–34)
MCHC RBC-ENTMCNC: 24.6 PG — LOW (ref 27–34)
MCHC RBC-ENTMCNC: 31.7 GM/DL — LOW (ref 32–36)
MCHC RBC-ENTMCNC: 31.7 GM/DL — LOW (ref 32–36)
MCV RBC AUTO: 77.5 FL — LOW (ref 80–100)
MCV RBC AUTO: 77.5 FL — LOW (ref 80–100)
MRSA PCR RESULT.: SIGNIFICANT CHANGE UP
MRSA PCR RESULT.: SIGNIFICANT CHANGE UP
NITRITE UR-MCNC: POSITIVE
NITRITE UR-MCNC: POSITIVE
NRBC # BLD: 0 /100 WBCS — SIGNIFICANT CHANGE UP (ref 0–0)
NRBC # BLD: 0 /100 WBCS — SIGNIFICANT CHANGE UP (ref 0–0)
ORGANISM # SPEC MICROSCOPIC CNT: ABNORMAL
PH UR: 6.5 — SIGNIFICANT CHANGE UP (ref 5–8)
PH UR: 6.5 — SIGNIFICANT CHANGE UP (ref 5–8)
PLATELET # BLD AUTO: 159 K/UL — SIGNIFICANT CHANGE UP (ref 150–400)
PLATELET # BLD AUTO: 159 K/UL — SIGNIFICANT CHANGE UP (ref 150–400)
POTASSIUM SERPL-MCNC: 4.7 MMOL/L — SIGNIFICANT CHANGE UP (ref 3.5–5.3)
POTASSIUM SERPL-MCNC: 4.7 MMOL/L — SIGNIFICANT CHANGE UP (ref 3.5–5.3)
POTASSIUM SERPL-SCNC: 4.7 MMOL/L — SIGNIFICANT CHANGE UP (ref 3.5–5.3)
POTASSIUM SERPL-SCNC: 4.7 MMOL/L — SIGNIFICANT CHANGE UP (ref 3.5–5.3)
PROT ?TM UR-MCNC: 16 MG/DL — HIGH (ref 0–12)
PROT ?TM UR-MCNC: 16 MG/DL — HIGH (ref 0–12)
PROT SERPL-MCNC: 5.2 G/DL — LOW (ref 6–8.3)
PROT SERPL-MCNC: 5.2 G/DL — LOW (ref 6–8.3)
PROT UR-MCNC: SIGNIFICANT CHANGE UP MG/DL
PROT UR-MCNC: SIGNIFICANT CHANGE UP MG/DL
PROT/CREAT UR-RTO: 0.3 RATIO — HIGH (ref 0–0.2)
PROT/CREAT UR-RTO: 0.3 RATIO — HIGH (ref 0–0.2)
RBC # BLD: 3.74 M/UL — LOW (ref 4.2–5.8)
RBC # BLD: 3.74 M/UL — LOW (ref 4.2–5.8)
RBC # FLD: 16.6 % — HIGH (ref 10.3–14.5)
RBC # FLD: 16.6 % — HIGH (ref 10.3–14.5)
RBC CASTS # UR COMP ASSIST: 1 /HPF — SIGNIFICANT CHANGE UP (ref 0–4)
RBC CASTS # UR COMP ASSIST: 1 /HPF — SIGNIFICANT CHANGE UP (ref 0–4)
REVIEW: SIGNIFICANT CHANGE UP
REVIEW: SIGNIFICANT CHANGE UP
S AUREUS DNA NOSE QL NAA+PROBE: DETECTED
S AUREUS DNA NOSE QL NAA+PROBE: DETECTED
SODIUM SERPL-SCNC: 130 MMOL/L — LOW (ref 135–145)
SODIUM SERPL-SCNC: 130 MMOL/L — LOW (ref 135–145)
SP GR SPEC: 1.02 — SIGNIFICANT CHANGE UP (ref 1–1.03)
SP GR SPEC: 1.02 — SIGNIFICANT CHANGE UP (ref 1–1.03)
SPECIMEN SOURCE: SIGNIFICANT CHANGE UP
SQUAMOUS # UR AUTO: 0 /HPF — SIGNIFICANT CHANGE UP (ref 0–5)
SQUAMOUS # UR AUTO: 0 /HPF — SIGNIFICANT CHANGE UP (ref 0–5)
UROBILINOGEN FLD QL: 0.2 MG/DL — SIGNIFICANT CHANGE UP (ref 0.2–1)
UROBILINOGEN FLD QL: 0.2 MG/DL — SIGNIFICANT CHANGE UP (ref 0.2–1)
WBC # BLD: 16.53 K/UL — HIGH (ref 3.8–10.5)
WBC # BLD: 16.53 K/UL — HIGH (ref 3.8–10.5)
WBC # FLD AUTO: 16.53 K/UL — HIGH (ref 3.8–10.5)
WBC # FLD AUTO: 16.53 K/UL — HIGH (ref 3.8–10.5)
WBC UR QL: 128 /HPF — HIGH (ref 0–5)
WBC UR QL: 128 /HPF — HIGH (ref 0–5)

## 2023-11-17 PROCEDURE — 99233 SBSQ HOSP IP/OBS HIGH 50: CPT

## 2023-11-17 RX ORDER — ACETAMINOPHEN 500 MG
1000 TABLET ORAL ONCE
Refills: 0 | Status: COMPLETED | OUTPATIENT
Start: 2023-11-17 | End: 2023-11-17

## 2023-11-17 RX ADMIN — Medication 650 MILLIGRAM(S): at 19:21

## 2023-11-17 RX ADMIN — HEPARIN SODIUM 1100 UNIT(S)/HR: 5000 INJECTION INTRAVENOUS; SUBCUTANEOUS at 19:24

## 2023-11-17 RX ADMIN — Medication 3: at 18:49

## 2023-11-17 RX ADMIN — Medication 400 MILLIGRAM(S): at 01:12

## 2023-11-17 RX ADMIN — Medication 650 MILLIGRAM(S): at 19:51

## 2023-11-17 RX ADMIN — Medication 1: at 09:46

## 2023-11-17 RX ADMIN — Medication 1000 MILLIGRAM(S): at 02:12

## 2023-11-17 RX ADMIN — Medication 250 MILLIGRAM(S): at 17:38

## 2023-11-17 RX ADMIN — AMLODIPINE BESYLATE 5 MILLIGRAM(S): 2.5 TABLET ORAL at 06:30

## 2023-11-17 RX ADMIN — HEPARIN SODIUM 1100 UNIT(S)/HR: 5000 INJECTION INTRAVENOUS; SUBCUTANEOUS at 15:39

## 2023-11-17 RX ADMIN — SODIUM CHLORIDE 150 MILLILITER(S): 9 INJECTION, SOLUTION INTRAVENOUS at 06:30

## 2023-11-17 RX ADMIN — HEPARIN SODIUM 1100 UNIT(S)/HR: 5000 INJECTION INTRAVENOUS; SUBCUTANEOUS at 07:40

## 2023-11-17 RX ADMIN — SODIUM CHLORIDE 150 MILLILITER(S): 9 INJECTION, SOLUTION INTRAVENOUS at 01:11

## 2023-11-17 NOTE — PROGRESS NOTE ADULT - SUBJECTIVE AND OBJECTIVE BOX
NEPHROLOGY-[facility] [phone]        Patient seen and examined in bed.  He was the same   Febrile         MEDICATIONS  (STANDING):  amLODIPine   Tablet 5 milliGRAM(s) Oral daily  chlorhexidine 2% Cloths 1 Application(s) Topical daily  dextrose 5%. 1000 milliLiter(s) (50 mL/Hr) IV Continuous <Continuous>  dextrose 5%. 1000 milliLiter(s) (100 mL/Hr) IV Continuous <Continuous>  dextrose 50% Injectable 25 Gram(s) IV Push once  dextrose 50% Injectable 12.5 Gram(s) IV Push once  dextrose 50% Injectable 25 Gram(s) IV Push once  glucagon  Injectable 1 milliGRAM(s) IntraMuscular once  heparin  Infusion.  Unit(s)/Hr (11 mL/Hr) IV Continuous <Continuous>  influenza   Vaccine 0.5 milliLiter(s) IntraMuscular once  insulin lispro (ADMELOG) corrective regimen sliding scale   SubCutaneous three times a day before meals  insulin lispro (ADMELOG) corrective regimen sliding scale   SubCutaneous at bedtime  lactated ringers. 1000 milliLiter(s) (150 mL/Hr) IV Continuous <Continuous>  vancomycin  IVPB      vancomycin  IVPB 1000 milliGRAM(s) IV Intermittent every 24 hours      VITAL:  T(C): , Max: 38.1 (11-16-23 @ 17:02)  T(F): , Max: 100.5 (11-16-23 @ 17:02)  HR: 88 (11-17-23 @ 05:40)  BP: 128/77 (11-17-23 @ 05:40)  BP(mean): --  RR: 18 (11-17-23 @ 05:40)  SpO2: 95% (11-17-23 @ 05:40)  Wt(kg): --    I and O's:    11-16 @ 07:01  -  11-17 @ 07:00  --------------------------------------------------------  IN: 1433 mL / OUT: 575 mL / NET: 858 mL          PHYSICAL EXAM:    Constitutional: NAD; cachetic   Neck:  No JVD  Respiratory: reduce   Cardiovascular: S1 and S2  Gastrointestinal: BS+, soft, NT/ND  Extremities: No peripheral edema  Neurological: A/O x 3, no focal deficits  Psychiatric: Normal mood, normal affect  : No Pacheco  Skin: No rashes  Access: Not applicable    LABS:                        9.2    16.53 )-----------( 159      ( 17 Nov 2023 06:51 )             29.0     11-17    130<L>  |  99  |  36<H>  ----------------------------<  138<H>  4.7   |  17<L>  |  3.35<H>    Ca    7.5<L>      17 Nov 2023 06:49    TPro  5.2<L>  /  Alb  2.1<L>  /  TBili  0.5  /  DBili  x   /  AST  42<H>  /  ALT  25  /  AlkPhos  79  11-17          Urine Studies:  Urinalysis Basic - ( 17 Nov 2023 06:49 )    Color: x / Appearance: x / SG: x / pH: x  Gluc: 138 mg/dL / Ketone: x  / Bili: x / Urobili: x   Blood: x / Protein: x / Nitrite: x   Leuk Esterase: x / RBC: x / WBC x   Sq Epi: x / Non Sq Epi: x / Bacteria: x      Creatinine, Random Urine: 47 mg/dL (11-17 @ 04:22)  Protein/Creatinine Ratio Calculation: 0.3 Ratio (11-17 @ 04:22)        RADIOLOGY & ADDITIONAL STUDIES:      < from: CT Angio Abd Aorta w/run-off w/ IV Cont (11.15.23 @ 18:17) >    ACC: [id number] EXAM:  CT ANGIO ABD AOR W RUN(W)AW IC   ORDERED BY: BRUNO CARNEY     PROCEDURE DATE:  11/15/2023          INTERPRETATION:  CLINICAL INFORMATION: Gangrenous right toes with   occluded distal superficial femoral artery and popliteal arteries on   recent CTA right lower extremity. Evaluate abdominal aorta and left lower   extremity vessels    COMPARISON: Same-day CT angiogram right lower extremity, CT abdomen   pelvis 11/1/2016.    CONTRAST/COMPLICATIONS:  IV Contrast: [id number] 125  cc administered 25 cc discarded  Oral Contrast: NONE  Complications: None reported at time of study completion    CT ANGIOGRAM ABDOMEN, PELVIS, AND LOWER EXTREMITIES:    PROCEDURE:  Initially, nonenhanced CT was obtained through the calves. Then,   following the rapid administration of intravenous contrast, CT   angiography was performed through the abdomen, pelvis, and lower   extremities down to the toes. Delayed images through the calves were also   obtained. Sagittal and coronal reformats as well as 3D reconstructions   were performed.    FINDINGS:    CENTRAL ARTERIAL SYSTEM:    Motion limited evaluation. No abdominal aortic aneurysm. Circumferential   calcified plaque and circumferential mural thrombus of the abdominal   aorta. Celiac trunk and proximal SMA demonstrate mild category plaque.   Mid/distal SMA cannot be assessed due to motion. MARITZA is small in caliber,   grossly patent. Bilateral renal arteries with minimal to mild category   disease. Central vein patency is notassessed on this study due to timing   of contrast.    Right common, external, and internal iliac arteries demonstrate diffuse   plaque resulting in mild-to-moderate category disease.    Chronic occlusion of the left common, external, and proximal internal and   iliac arteries, also seen on prior imaging from 2016. Reconstitution of   distal left internal iliac artery branches.    RIGHT LOWER EXTREMITY:    Right common femoral artery borderline mild to moderate category bulky   calcified plaque. Right deep femoral artery multifocal mild category   plaque.    Long segment right SFA and popliteal artery occlusion. Markedly limited   runoff of the right lower extremity, with minimal opacification of the   proximal trifurcation arteries on delayed phase. Mid and distal segments   of trifurcation arteries are not opacified on the delayed phase.    Asymmetric right lower extremity soft tissue edema. Correlate for any   signs and symptoms of acute right limb ischemia. Recommend vascular   surgery evaluation.    LEFT LOWER EXTREMITY:    Reconstituted left common femoral artery via collaterals with bulky mild   to moderate category plaque. Left deep femoral artery also with moderate   stenosis at origin secondary to mixed plaque.    Left superficial femoral artery moderate stenosis at origin secondary to   calcified plaque. Remainder of the left SFA demonstrates multifocal mild   category plaque. Left popliteal artery with multifocal mild category   plaque.    Left trifurcation arteries are heavily calcified, with occluded posterior   tibial artery. Anterior tibial and peroneal arteries demonstrate contrast   opacification to the distal leg. Peroneal arteries and plantar arteries   are not opacified on the delayed phase .    NONVASCULAR FINDINGS:    Small pleural effusions with adjacent compressive atelectasis of the lung   parenchyma. Liver, biliary tree, gallbladder, pancreas, and spleen are   unremarkable. Left greater than right adrenal thickening. Contrast   opacifies bilateral renal collecting systems, demonstrating mild   fullness. No overt hydronephrosis of either kidney. Wedge-shaped   hypoattenuating focus of the left kidney on image 190 series 7, is   concerning for renal infarct or focus of infection/pyelonephritis.  Additional scattered hypoattenuating foci in the kidneys are too small to   characterize. Urinary bladder is moderately distended. Droplet of air   within the bladder in nondependent location likely due to recent   instrumentation. Bladder is partially opacified with contrast. Prostate   is enlarged.    Stomach is underdistended. Small duodenal diverticulum. No small bowel   distention. Appendix is not obstructed. Mild stool burden of the colon   limits evaluation of the colonic mucosa. Small ascites. Mesenteric   haziness. No enlarged lymph nodes of the abdomen/pelvis. Diffuse soft   tissue edema. Degenerative changes of the bones. Again, soft tissue   erosion at the distal right first phalanx. Correlate with recent   radiographs.    IMPRESSION:    VASCULAR:    RIGHT:  -Long segment right femoropopliteal arterial occlusion. Markedly limited   runoff of the right lower extremity, with minimal opacification of the   proximal trifurcation arteries on delayed phase. Asymmetric right lower   extremity soft tissue edema. Correlate for any signs and symptoms of   acute right limb ischemia. Recommend vascular surgery evaluation.  -Borderline mild to moderate right CFA stenosis.  -Borderline mild to moderate right iliac artery disease.    LEFT:  -Chronically occluded left iliac arteries since 2016 CT with   reconstitution at the left CFA.  -Borderline mild to moderate category plaque at the left CFA. Moderate   category plaque at origins of the left SFA and DFA.  -Two vessel contrast opacification to the distal left leg via anterior   tibial and peroneal arteries. Arteries of the left foot are not opacified   on the delayed phase.    Dr. Casanova discussed these above findings with Dr. Josh Pichardo from   Surgery on 11/15/2023 at 8:05 PM, with read back.    NON-VASCULAR:    -Small bilateral pleural effusion with adjacent compressive atelectasis.  -Wedge-shaped hypoattenuating focus of the left kidney on image 190   series 7, is concerning for renal infarct or focus of   infection/pyelonephritis.  -Moderately distended urinary bladder and enlarged prostate which may   reflect outlet obstruction. Mild fullness of the renal collecting systems   without overt hydronephrosis.  -Again, soft tissue erosion at the distal right first phalanx. Correlate   with recent radiographs.    --- End of Report ---           DOMINIQUE GOTTI MD; Resident Radiologist  This document has been electronically signed.  JANUARY CASANOVA M.D., ATTENDING RADIOLOGIST  This document has been electronicallysigned. Nov 15 2023  8:06PM    < end of copied text >

## 2023-11-17 NOTE — DIETITIAN INITIAL EVALUATION ADULT - OTHER CALCULATIONS
Fluid needs deferred to team. Energy and protein needs based on dosing wt of 64.7kg in consideration of malnutrition and Increased nutrient needs.

## 2023-11-17 NOTE — DIETITIAN INITIAL EVALUATION ADULT - REASON INDICATOR FOR ASSESSMENT
Pt seen for consult for pressure injury stage 2/>. Information obtained from pt, electronic medical record. Chart reviewed, events noted.

## 2023-11-17 NOTE — DIETITIAN INITIAL EVALUATION ADULT - EDUCATION DIETARY MODIFICATIONS
Emphasized the importance of adequate kcal and protein intake; recommend to optimize nutritional intake in case of decreased appetite; recommended small frequent meals by ordering nutrient-dense snacks and leaving non-perishable food away from tray for later consumption during the day or between meals; to start with protein, and sips of supplement throughout the day; reviewed foods with protein and menu order procedures in hospital./(1) partially meets; needs review/practice

## 2023-11-17 NOTE — DIETITIAN INITIAL EVALUATION ADULT - ENERGY INTAKE
Poor (<50%) pt reports poor PO intake ~50% or less of the foods provided while in house. Agree to get oral nutrition supplements to supplement PO intake. Pt made aware of menu ordering procedure in house w/ menu provided, encourage pt to order preferred foods as needed to optimize PO intake while in house. Food preferences updated, will honor as able.

## 2023-11-17 NOTE — PHYSICAL THERAPY INITIAL EVALUATION ADULT - PERTINENT HX OF CURRENT PROBLEM, REHAB EVAL
62 y/o male with pmhx htn, dm, hld  presents to the ED sent in from rehab center for right foot discoloration and pain. Patient states that he was discharged about 1 week ago from outside hospital after being treated for infection to right foot. He has been in rehab center for about 1 week. States today nurses looked at his foot and sent him to [facility] ED for evaluation. Patient has not been on any abx for foot while in rehab. Hosp Course: 11/15 CTA A/P: BLE run-off significant for Rt external iliac artery occlusion with no reconstitution distally, +occlusion of left external iliac with +reconstitution distally; on IV heparin;

## 2023-11-17 NOTE — DIETITIAN INITIAL EVALUATION ADULT - SIGNS/SYMPTOMS
pt reports PO <75% for the past 2/3 weeks, wt loss of 4.9% PTA, mild fat and muscle wasting  pt with suspected deep tissue injury to sacrum and right leg/foot wound

## 2023-11-17 NOTE — PROGRESS NOTE ADULT - SUBJECTIVE AND OBJECTIVE BOX
Patient is a 63y old  Male who presents with a chief complaint of Other specified disorders of circulatory system     (17 Nov 2023 14:42)       INTERVAL HPI/OVERNIGHT EVENTS:  Patient seen and evaluated at bedside.  Pt is resting comfortable in NAD. Denies N/V/F/C.    Allergies    penicillin (Anaphylaxis)    Intolerances        Vital Signs Last 24 Hrs  T(C): 36.7 (17 Nov 2023 13:03), Max: 38.1 (16 Nov 2023 17:02)  T(F): 98 (17 Nov 2023 13:03), Max: 100.5 (16 Nov 2023 17:02)  HR: 88 (17 Nov 2023 13:03) (79 - 102)  BP: 120/79 (17 Nov 2023 13:03) (120/79 - 155/83)  BP(mean): --  RR: 18 (17 Nov 2023 13:03) (18 - 18)  SpO2: 95% (17 Nov 2023 13:03) (94% - 95%)    Parameters below as of 17 Nov 2023 13:03  Patient On (Oxygen Delivery Method): room air        LABS:                        9.2    16.53 )-----------( 159      ( 17 Nov 2023 06:51 )             29.0     11-17    130<L>  |  99  |  36<H>  ----------------------------<  138<H>  4.7   |  17<L>  |  3.35<H>    Ca    7.5<L>      17 Nov 2023 06:49    TPro  5.2<L>  /  Alb  2.1<L>  /  TBili  0.5  /  DBili  x   /  AST  42<H>  /  ALT  25  /  AlkPhos  79  11-17    PT/INR - ( 15 Nov 2023 23:35 )   PT: 12.7 sec;   INR: 1.16 ratio         PTT - ( 17 Nov 2023 06:51 )  PTT:73.6 sec  Urinalysis Basic - ( 17 Nov 2023 06:49 )    Color: x / Appearance: x / SG: x / pH: x  Gluc: 138 mg/dL / Ketone: x  / Bili: x / Urobili: x   Blood: x / Protein: x / Nitrite: x   Leuk Esterase: x / RBC: x / WBC x   Sq Epi: x / Non Sq Epi: x / Bacteria: x      CAPILLARY BLOOD GLUCOSE      POCT Blood Glucose.: 163 mg/dL (17 Nov 2023 08:51)  POCT Blood Glucose.: 144 mg/dL (16 Nov 2023 21:47)  POCT Blood Glucose.: 240 mg/dL (16 Nov 2023 17:30)      Lower Extremity Physical Exam:    Vascular: DP/PT 0/4, B/L, CFT >3 seconds B/L, Temperature gradient warm to cold on R, warm to cool on L.   Neuro: Epicritic sensation intact to the level of digits, B/L.  Musculoskeletal/Ortho: unremarkable  Skin: Right foot partial thickness dry gangrene digits 1-5, dorsal medial midfoot to anterior medial ankle ischemic changes, no pus, no tracking or tunneling, no malodor, no bogginess or fluctuance.   RADIOLOGY & ADDITIONAL TESTS:

## 2023-11-17 NOTE — DIETITIAN INITIAL EVALUATION ADULT - CONTINUE CURRENT NUTRITION CARE PLAN
Continue CSTCHO diet as tolerated. RD remains available to adjust diet as needed./yes Continue CSTCHO diet as tolerated, RD will monitor renal indices and adjust diet as needed./yes

## 2023-11-17 NOTE — DIETITIAN INITIAL EVALUATION ADULT - NSFNSADHERENCEPTAFT_GEN_A_CORE
Pt was on Metformin and insulins (Glargine) per H&P to regulate blood glucose PTA. No recent HbA1c available.

## 2023-11-17 NOTE — DIETITIAN INITIAL EVALUATION ADULT - ETIOLOGY
inadequate PO intake in setting of medical condition causing recent hospitalization/rehab PTA  increased physiological demand for nutrients to aid wound healing

## 2023-11-17 NOTE — DIETITIAN INITIAL EVALUATION ADULT - ORAL NUTRITION SUPPLEMENTS
recommend adding Glucerna 2x daily (440kcals, 20g protein) to provide additional calories and nutrients to encourage PO intake and to aid wound healing.

## 2023-11-17 NOTE — PROGRESS NOTE ADULT - SUBJECTIVE AND OBJECTIVE BOX
Plastic Surgery Progress Note (pg [facility]: [id number], [facility]: [phone])    SUBJECTIVE  The patient was seen and examined.     OBJECTIVE  ___________________________________________________  VITAL SIGNS / I&O's   Vital Signs Last 24 Hrs  T(C): 36.9 (17 Nov 2023 05:40), Max: 38.1 (16 Nov 2023 17:02)  T(F): 98.5 (17 Nov 2023 05:40), Max: 100.5 (16 Nov 2023 17:02)  HR: 88 (17 Nov 2023 05:40) (79 - 102)  BP: 128/77 (17 Nov 2023 05:40) (128/77 - 155/83)  BP(mean): --  RR: 18 (17 Nov 2023 05:40) (18 - 18)  SpO2: 95% (17 Nov 2023 05:40) (94% - 95%)    Parameters below as of 17 Nov 2023 05:40  Patient On (Oxygen Delivery Method): room air          16 Nov 2023 07:01  -  17 Nov 2023 07:00  --------------------------------------------------------  IN:    Heparin Infusion: 143 mL    Lactated Ringers: 1050 mL    Oral Fluid: 240 mL  Total IN: 1433 mL    OUT:    Voided (mL): 575 mL  Total OUT: 575 mL    Total NET: 858 mL        ___________________________________________________  PHYSICAL EXAM    -- CONSTITUTIONAL: NAD, lying in bed  -- NEURO: Awake, alert  -- HEENT:  -- NECK:   -- CHEST:  Breast: L                R  -- PULM: Non-labored respirations  -- ABDOMEN:   -- EXTREMITIES:     ___________________________________________________  LABS                        9.2    16.53 )-----------( 159      ( 17 Nov 2023 06:51 )             29.0     17 Nov 2023 06:49    130    |  99     |  36     ----------------------------<  138    4.7     |  17     |  3.35     Ca    7.5        17 Nov 2023 06:49    TPro  5.2    /  Alb  2.1    /  TBili  0.5    /  DBili  x      /  AST  42     /  ALT  25     /  AlkPhos  79     17 Nov 2023 06:49    PT/INR - ( 15 Nov 2023 23:35 )   PT: 12.7 sec;   INR: 1.16 ratio         PTT - ( 17 Nov 2023 06:51 )  PTT:73.6 sec  CAPILLARY BLOOD GLUCOSE      POCT Blood Glucose.: 163 mg/dL (17 Nov 2023 08:51)  POCT Blood Glucose.: 144 mg/dL (16 Nov 2023 21:47)  POCT Blood Glucose.: 240 mg/dL (16 Nov 2023 17:30)  POCT Blood Glucose.: 217 mg/dL (16 Nov 2023 14:25)    CARDIAC MARKERS ( 15 Nov 2023 18:35 )  x     / x     / 371 U/L / x     / x          Urinalysis Basic - ( 17 Nov 2023 06:49 )    Color: x / Appearance: x / SG: x / pH: x  Gluc: 138 mg/dL / Ketone: x  / Bili: x / Urobili: x   Blood: x / Protein: x / Nitrite: x   Leuk Esterase: x / RBC: x / WBC x   Sq Epi: x / Non Sq Epi: x / Bacteria: x      ___________________________________________________  MICRO  Recent Cultures:  Specimen Source: .Blood Blood-Peripheral, 11-15 @ 12:50; Results   Growth in anaerobic bottle: Gram Positive Cocci in Clusters  Growth in aerobic bottle: Gram Positive Cocci in Clusters<!>; Gram Stain:   Growth in anaerobic bottle: Gram Positive Cocci in Clusters  Growth in aerobic bottle: Gram Positive Cocci in Clusters<!>; Organism: --  Specimen Source: .Blood Blood-Peripheral, 11-15 @ 12:35; Results   Growth in aerobic bottle: Gram Positive Cocci in Clusters  Growth in anaerobic bottle: Gram Positive Cocci in Clusters  Direct identification is available within approximately 3-5  hours either by Blood Panel Multiplexed PCR or Direct  MALDI-TOF. Details: [email or web address]<!>; Gram Stain:   Growth in aerobic bottle: Gram Positive Cocci in Clusters  Growth in anaerobic bottle: Gram Positive Cocci in Clusters<!>; Organism: Blood Culture PCR<!>    ___________________________________________________  MEDICATIONS  (STANDING):  amLODIPine   Tablet 5 milliGRAM(s) Oral daily  chlorhexidine 2% Cloths 1 Application(s) Topical daily  dextrose 5%. 1000 milliLiter(s) (50 mL/Hr) IV Continuous <Continuous>  dextrose 5%. 1000 milliLiter(s) (100 mL/Hr) IV Continuous <Continuous>  dextrose 50% Injectable 25 Gram(s) IV Push once  dextrose 50% Injectable 12.5 Gram(s) IV Push once  dextrose 50% Injectable 25 Gram(s) IV Push once  glucagon  Injectable 1 milliGRAM(s) IntraMuscular once  heparin  Infusion.  Unit(s)/Hr (11 mL/Hr) IV Continuous <Continuous>  influenza   Vaccine 0.5 milliLiter(s) IntraMuscular once  insulin lispro (ADMELOG) corrective regimen sliding scale   SubCutaneous three times a day before meals  insulin lispro (ADMELOG) corrective regimen sliding scale   SubCutaneous at bedtime  lactated ringers. 1000 milliLiter(s) (150 mL/Hr) IV Continuous <Continuous>  vancomycin  IVPB      vancomycin  IVPB 1000 milliGRAM(s) IV Intermittent every 24 hours    MEDICATIONS  (PRN):  acetaminophen     Tablet .. 650 milliGRAM(s) Oral every 6 hours PRN Temp greater or equal to 38C (100.4F)  dextrose Oral Gel 15 Gram(s) Oral once PRN Blood Glucose LESS THAN 70 milliGRAM(s)/deciliter  heparin   Injectable 5000 Unit(s) IV Push every 6 hours PRN For aPTT less than 40  heparin   Injectable 2500 Unit(s) IV Push every 6 hours PRN For aPTT between 40 - 57          Assessment & Plan      Arjun Darling  Plastic & Reconstructive Surgery, PGY-1  #[id number]

## 2023-11-17 NOTE — DISCHARGE NOTE PROVIDER - CARE PROVIDERS DIRECT ADDRESSES
,[email or web address] ,[email or web address],[email or web address] ,[email or web address]

## 2023-11-17 NOTE — DISCHARGE NOTE PROVIDER - CARE PROVIDER_API CALL
Marcelo Haider  Podiatric Medicine and Surgery  [address] [facility] [address] [facility], [address]  [place] [address]  Phone: [phone]  Fax: [phone]  Established Patient  Follow Up Time: 1 week   Marcelo Haider  Podiatric Medicine and Surgery  [address] [facility], [address]  [place] [address]  Phone: [phone]  Fax: [phone]  Established Patient  Follow Up Time: 1 week   Marcelo Haider  Podiatric Medicine and Surgery  [address] [facility], [address]  [place] [address]  Phone: [phone]  Fax: [phone]  Established Patient  Follow Up Time: 1 week   Marcelo Haider  Podiatric Medicine and Surgery  [address] [facility], [address]  [place] [address]  Phone: [phone]  Fax: [phone]  Established Patient  Follow Up Time: 1 week    Soham Flynn  Vascular Surgery  [address] [id number]  [place] [address]  Phone: [phone]  Fax: [phone]  Follow Up Time: 2 weeks    Channing Cabrera  Cardiovascular Disease  [address] [place] [address] [address]  [place] [address]  Phone: [phone]  Fax: [phone]  Follow Up Time: 1 week   Marcelo Haider  Podiatric Medicine and Surgery  [address] [facility], [address]  [place] [address]  Phone: [phone]  Fax: [phone]  Established Patient  Follow Up Time: 1 week    Soham Flynn  Vascular Surgery  [address] [id number]  [place] [address]  Phone: [phone]  Fax: [phone]  Follow Up Time: 2 weeks    Channing Cabrera  Cardiovascular Disease  [address] [place] [address] [address]  [place] [address]  Phone: [phone]  Fax: [phone]  Follow Up Time: 1 week   Marcelo Haider  Podiatric Medicine and Surgery  [address] [facility], [address]  [place] [address]  Phone: [phone]  Fax: [phone]  Established Patient  Follow Up Time: 1 week    Soham Flynn  Vascular Surgery  [address] [id number]  [place] [address]  Phone: [phone]  Fax: [phone]  Follow Up Time: 2 weeks    Channing Cabrera  Cardiovascular Disease  [address] [place] [address] [address]  [place] [address]  Phone: [phone]  Fax: [phone]  Follow Up Time: 1 week

## 2023-11-17 NOTE — DIETITIAN INITIAL EVALUATION ADULT - NSFNSPHYEXAMSKINFT_GEN_A_CORE
suspected deep tissue injury to sacrum per flowsheet 11/15. Also with right foot/leg wound, seen by podiatry and vascular.

## 2023-11-17 NOTE — DISCHARGE NOTE PROVIDER - NPI NUMBER (FOR SYSADMIN USE ONLY) :
[[id number]] [[id number]] [[id number]] [[id number]],[[id number]],[[id number]] [[id number]],[[id number]],[[id number]] [[id number]],[[id number]],[[id number]]

## 2023-11-17 NOTE — CHART NOTE - NSCHARTNOTEFT_GEN_A_CORE
Consult received for Geriatrics and Palliative Medicine Team for GOC/ACP in setting of pt refusing amputation. On chart review, note that pt is now amenable to amputation and is being planned for surgery. Would defer advance care planning to the primary team in the inpatient or outpatient setting, as it is an non-urgent matter. We will not formally consult at this time. Please don't hesitate to call with any questions.    Katherine Noble MD  [facility] Team Consults  Please call if we can be of assistance, [phone]

## 2023-11-17 NOTE — PROGRESS NOTE ADULT - SUBJECTIVE AND OBJECTIVE BOX
[facility]  Division of Infectious Diseases  [phone]    Name: AIXA LARSON  Age: 63y  Gender: Male  MRN: [id number]    Interval History--  Notes reviewed.     Past Medical History--  Hypertension    Diabetes        For details regarding the patient's social history, family history, and other miscellaneous elements, please refer the initial infectious diseases consultation and/or the admitting history and physical examination for this admission.    Allergies    penicillin (Anaphylaxis)    Intolerances        Medications--  Antibiotics:  vancomycin  IVPB      vancomycin  IVPB 1000 milliGRAM(s) IV Intermittent every 24 hours    Immunologic:  influenza   Vaccine 0.5 milliLiter(s) IntraMuscular once    Other:  acetaminophen     Tablet .. PRN  amLODIPine   Tablet  chlorhexidine 2% Cloths  dextrose 5%.  dextrose 5%.  dextrose 50% Injectable  dextrose 50% Injectable  dextrose 50% Injectable  dextrose Oral Gel PRN  glucagon  Injectable  heparin   Injectable PRN  heparin   Injectable PRN  heparin  Infusion.  insulin lispro (ADMELOG) corrective regimen sliding scale  insulin lispro (ADMELOG) corrective regimen sliding scale  lactated ringers.      Review of Systems--  A 10-point review of systems was obtained.     Pertinent positives and negatives--  Constitutional: No fevers. No Chills. No Rigors.   Cardiovascular: No chest pain. No palpitations.  Respiratory: No shortness of breath. No cough.  Gastrointestinal: No nausea or vomiting. No diarrhea or constipation.   Psychiatric: Pleasant. Appropriate affect.    Review of systems otherwise negative except as previously noted.    Physical Examination--  Vital Signs: T(F): 98.5 (11-17-23 @ 05:40), Max: 100.5 (11-16-23 @ 17:02)  HR: 88 (11-17-23 @ 05:40)  BP: 128/77 (11-17-23 @ 05:40)  RR: 18 (11-17-23 @ 05:40)  SpO2: 95% (11-17-23 @ 05:40)  Wt(kg): --  General: Nontoxic-appearing Male in no acute distress.  HEENT: AT/NC. PERRL. EOMI. Anicteric. Conjunctiva pink and moist. Oropharynx clear. Dentition fair.  Neck: Not rigid. No sense of mass.  Nodes: None palpable.  Lungs: Clear bilaterally without rales, wheezing or rhonchi  Heart: Regular rate and rhythm. No Murmur. No rub. No gallop. No palpable thrill.  Abdomen: Bowel sounds present and normoactive. Soft. Nondistended. Nontender. No sense of mass. No organomegaly.  Back: No spinal tenderness. No costovertebral angle tenderness.   Extremities: No cyanosis or clubbing. No edema.   Skin: Warm. Dry. Good turgor. No rash. No vasculitic stigmata.  Psychiatric: Appropriate affect and mood for situation.         Laboratory Studies--  CBC                        9.2    16.53 )-----------( 159      ( 17 Nov 2023 06:51 )             29.0       Chemistries  11-17    130<L>  |  99  |  36<H>  ----------------------------<  138<H>  4.7   |  17<L>  |  3.35<H>    Ca    7.5<L>      17 Nov 2023 06:49    TPro  5.2<L>  /  Alb  2.1<L>  /  TBili  0.5  /  DBili  x   /  AST  42<H>  /  ALT  25  /  AlkPhos  79  11-17      Culture Data    Culture - Blood (collected 15 Nov 2023 12:50)  Source: .Blood Blood-Peripheral  Gram Stain (16 Nov 2023 16:29):    Growth in anaerobic bottle: Gram Positive Cocci in Clusters    Growth in aerobic bottle: Gram Positive Cocci in Clusters  Preliminary Report (16 Nov 2023 16:29):    Growth in anaerobic bottle: Gram Positive Cocci in Clusters    Growth in aerobic bottle: Gram Positive Cocci in Clusters    Culture - Blood (collected 15 Nov 2023 12:35)  Source: .Blood Blood-Peripheral  Gram Stain (16 Nov 2023 15:33):    Growth in aerobic bottle: Gram Positive Cocci in Clusters    Growth in anaerobic bottle: Gram Positive Cocci in Clusters  Preliminary Report (16 Nov 2023 15:34):    Growth in aerobic bottle: Gram Positive Cocci in Clusters    Growth in anaerobic bottle: Gram Positive Cocci in Clusters    Direct identification is available within approximately 3-5    hours either by Blood Panel Multiplexed PCR or Direct    MALDI-TOF. Details: [email or web address]  Organism: Blood Culture PCR (16 Nov 2023 15:52)  Organism: Blood Culture PCR (16 Nov 2023 15:52)             [facility]  Division of Infectious Diseases  [phone]    Name: AIXA LARSON  Age: 63y  Gender: Male  MRN: [id number]    Interval History--  Notes reviewed. Seen earlier today.  Still amenable to amputation.  Pain control adequate.  Low-grade fever x1.  Blood cultures with 3 out of 4 gram-positive cocci in, identified as a coagulase-negative Staphylococcus.  Patient inquiring when surgery will occur.    Past Medical History--  Hypertension    Diabetes        For details regarding the patient's social history, family history, and other miscellaneous elements, please refer the initial infectious diseases consultation and/or the admitting history and physical examination for this admission.    Allergies    penicillin (Anaphylaxis)    Intolerances        Medications--  Antibiotics:  vancomycin  IVPB      vancomycin  IVPB 1000 milliGRAM(s) IV Intermittent every 24 hours    Immunologic:  influenza   Vaccine 0.5 milliLiter(s) IntraMuscular once    Other:  acetaminophen     Tablet .. PRN  amLODIPine   Tablet  chlorhexidine 2% Cloths  dextrose 5%.  dextrose 5%.  dextrose 50% Injectable  dextrose 50% Injectable  dextrose 50% Injectable  dextrose Oral Gel PRN  glucagon  Injectable  heparin   Injectable PRN  heparin   Injectable PRN  heparin  Infusion.  insulin lispro (ADMELOG) corrective regimen sliding scale  insulin lispro (ADMELOG) corrective regimen sliding scale  lactated ringers.      Review of Systems--  A 10-point review of systems was obtained.  Review of systems otherwise negative except as previously noted.    Physical Examination--  Vital Signs: T(F): 98.5 (11-17-23 @ 05:40), Max: 100.5 (11-16-23 @ 17:02)  HR: 88 (11-17-23 @ 05:40)  BP: 128/77 (11-17-23 @ 05:40)  RR: 18 (11-17-23 @ 05:40)  SpO2: 95% (11-17-23 @ 05:40)  Wt(kg): --  General: Nontoxic-appearing Male in no acute distress.  HEENT: Anicteric. Conjunctiva pink and moist. Oropharynx clear.  Neck: Not rigid. No sense of mass.  Nodes: None palpable.  Lungs: Diminished BS bilaterally without rales, wheezing or rhonchi  Heart: Regular rate and rhythm.   Abdomen: Bowel sounds present and normoactive. Soft. Nondistended. Nontender.   Extremities: RLE unchanged.  Psychiatric::  Grossly appropriate mood and affect, calmer.      Laboratory Studies--  CBC                        9.2    16.53 )-----------( 159      ( 17 Nov 2023 06:51 )             29.0       Chemistries  11-17    130<L>  |  99  |  36<H>  ----------------------------<  138<H>  4.7   |  17<L>  |  3.35<H>    Ca    7.5<L>      17 Nov 2023 06:49    TPro  5.2<L>  /  Alb  2.1<L>  /  TBili  0.5  /  DBili  x   /  AST  42<H>  /  ALT  25  /  AlkPhos  79  11-17      Culture Data    Culture - Blood (collected 15 Nov 2023 12:50)  Source: .Blood Blood-Peripheral  Gram Stain (16 Nov 2023 16:29):    Growth in anaerobic bottle: Gram Positive Cocci in Clusters    Growth in aerobic bottle: Gram Positive Cocci in Clusters  Preliminary Report (16 Nov 2023 16:29):    Growth in anaerobic bottle: Gram Positive Cocci in Clusters    Growth in aerobic bottle: Gram Positive Cocci in Clusters    Culture - Blood (collected 15 Nov 2023 12:35)  Source: .Blood Blood-Peripheral  Gram Stain (16 Nov 2023 15:33):    Growth in aerobic bottle: Gram Positive Cocci in Clusters    Growth in anaerobic bottle: Gram Positive Cocci in Clusters  Preliminary Report (16 Nov 2023 15:34):    Growth in aerobic bottle: Gram Positive Cocci in Clusters    Growth in anaerobic bottle: Gram Positive Cocci in Clusters    Direct identification is available within approximately 3-5    hours either by Blood Panel Multiplexed PCR or Direct    MALDI-TOF. Details: [email or web address]  Organism: Blood Culture PCR (16 Nov 2023 15:52)  Organism: Blood Culture PCR (16 Nov 2023 15:52)

## 2023-11-17 NOTE — DISCHARGE NOTE PROVIDER - NSDCCPCAREPLAN_GEN_ALL_CORE_FT
PRINCIPAL DISCHARGE DIAGNOSIS  Diagnosis: Acute lower limb ischemia  Assessment and Plan of Treatment: You were admitted with right foot gangrene. We did a below the knee amputation on 11/19 and then performed an above the knee amputation on 11/30. You completed a course of vancomycin, an antibiotic, and we placed a wound vac to aide in healing.      SECONDARY DISCHARGE DIAGNOSES  Diagnosis: Bacteremia  Assessment and Plan of Treatment: You were growing staph epidermidis, a bacteria, in your blood, so we treated you with IV vancomycin to clear this infection    Diagnosis: Acute UTI  Assessment and Plan of Treatment: You have a urinary tract infection so you will be discharged on Bactrim DS, an antibiotic, to take by mouth for 7 days    Diagnosis: OLIVER (acute kidney injury)  Assessment and Plan of Treatment: You had elevated creatinine levels and high acid in the blood. We treated you with sodium bicarb and now your kidney function has improved.    Diagnosis: Urinary retention  Assessment and Plan of Treatment: You are being discharged with a li catheter. You can have a trial of void at sub-acute rehab.     PRINCIPAL DISCHARGE DIAGNOSIS  Diagnosis: Acute lower limb ischemia  Assessment and Plan of Treatment: You were admitted with right foot gangrene. We did a below the knee amputation on 11/19 and then performed an above the knee amputation on 11/30. You completed a course of vancomycin, an antibiotic, and we placed a wound vac to aide in healing.  You are to continue with your dressings and follow up with vascular surgery   Your staples will stay in place until you follow up with vascular Dr. Justice.      SECONDARY DISCHARGE DIAGNOSES  Diagnosis: Bacteremia  Assessment and Plan of Treatment: You were growing staph epidermidis, a bacteria, in your blood, so we treated you with IV vancomycin to clear this infection    Diagnosis: Acute UTI  Assessment and Plan of Treatment: You have a urinary tract infection so you will be discharged on Bactrim DS, an antibiotic, to take by mouth for 7 days    Diagnosis: OLIVER (acute kidney injury)  Assessment and Plan of Treatment: You had elevated creatinine levels and high acid in the blood. We treated you with sodium bicarb and now your kidney function has improved.    Diagnosis: Urinary retention  Assessment and Plan of Treatment: You are being discharged with a li catheter. You can have a trial of void at sub-acute rehab.     PRINCIPAL DISCHARGE DIAGNOSIS  Diagnosis: Acute lower limb ischemia  Assessment and Plan of Treatment: You were admitted with right foot gangrene. We did a below the knee amputation on 11/19 and then performed an above the knee amputation on 11/30. You completed a course of vancomycin, an antibiotic, and we placed a wound vac to aide in healing.  You are to continue with your dressings and follow up with vascular surgery   Your staples will stay in place until you follow up with vascular Dr. Justice.      SECONDARY DISCHARGE DIAGNOSES  Diagnosis: Bacteremia  Assessment and Plan of Treatment: You were growing staph epidermidis, a bacteria, in your blood, so we treated you with IV vancomycin to clear this infection    Diagnosis: Acute UTI  Assessment and Plan of Treatment: You have a urinary tract infection so you will be discharged on Bactrim DS, an antibiotic, to take by mouth for 7 days    Diagnosis: OLIVER (acute kidney injury)  Assessment and Plan of Treatment: You had elevated creatinine levels and high acid in the blood. We treated you with sodium bicarb and now your kidney function has improved.    Diagnosis: Urinary retention  Assessment and Plan of Treatment: You are being discharged with a li catheter. You can have a trial of void at sub-acute rehab.    Diagnosis: Diabetes  Assessment and Plan of Treatment: A1C 8.8  You were on insulin sliding scale during your hospital admission  FS analyzed and range from 160-low 300s; your metformin was dc'ed on discharge as insulin is easier to titrate at rehab  You are to resume your Lantus (decreased from home dose 10 units to 8 units) as your finger sticks remained elevated along with the sliding scale as appropriate at rehab  Continue consistent carb diet and follow up with PCP   Make sure you get your HgA1c checked every three months.  If you take oral diabetes medications, check your blood glucose two times a day.  If you take insulin, check your blood glucose before meals and at bedtime.  It's important not to skip any meals.  Keep a log of your blood glucose results and always take it with you to your doctor appointments.  Keep a list of your current medications including injectables and over the counter medications and bring this medication list with you to all your doctor appointments.  If you have not seen your ophthalmologist this year call for appointment.  Check your feet daily for redness, sores, or openings. Do not self treat. If no improvement in two days call your primary care physician for an appointment.  Low blood sugar (hypoglycemia) is a blood sugar below 70mg/dl. Check your blood sugar if you feel signs/symptoms of hypoglycemia. If your blood sugar is below 70 take 15 grams of carbohydrates (ex 4 oz of apple juice, 3-4 glucose tablets, or 4-6 oz of regular soda) wait 15 minutes and repeat blood sugar to make sure it comes up above 70.  If your blood sugar is above 70 and you are due for a meal, have a meal.  If you are not due for a meal have a snack.  This snack helps keeps your blood sugar at a safe range.

## 2023-11-17 NOTE — DISCHARGE NOTE PROVIDER - NSDCFUADDAPPT_GEN_ALL_CORE_FT
Podiatry Discharge Instructions:  Follow up: Please follow up with Dr. Haider within 1 week of discharge from the hospital, please call [phone] for appointment and discuss that you recently were seen in the hospital.  Wound Care:  Please apply betadine to the right foot followed by 4x4 gauze, and yajaira  Weight bearing: Please weight bear as tolerated in a surgical shoe.  Antibiotics: Please continue as instructed. Podiatry Discharge Instructions:  Follow up: Please follow up with Dr. Haider within 1 week of discharge from the hospital, please call [phone] for appointment and discuss that you recently were seen in the hospital.  Wound Care:  Please apply betadine to the right foot followed by 4x4 gauze, and yajaira  Weight bearing: Please weight bear as tolerated in a surgical shoe.  Antibiotics: Please continue as instructed. Podiatry Discharge Instructions:  Follow up: Please follow up with Dr. Haider within 1 week of discharge from the hospital, please call [phone] for appointment and discuss that you recently were seen in the hospital.  Wound Care:  Please apply betadine to the right foot followed by 4x4 gauze, and yajaira  Weight bearing: Please weight bear as tolerated in a surgical shoe.  Antibiotics: Please continue as instructed. Podiatry Discharge Instructions:  Follow up: Please follow up with Dr. Haider within 1 week of discharge from the hospital, please call [phone] for appointment and discuss that you recently were seen in the hospital.  Wound Care:  Please apply betadine to the right foot followed by 4x4 gauze, and yajaira  Weight bearing: Please weight bear as tolerated in a surgical shoe.  Antibiotics: Please continue as instructed.    APPTS ARE READY TO BE MADE: [ x] YES    Best Family or Patient Contact (if needed):    Additional Information about above appointments (if needed):    1:   2:   3:     Other comments or requests:    Podiatry Discharge Instructions:  Follow up: Please follow up with Dr. Haider within 1 week of discharge from the hospital, please call [phone] for appointment and discuss that you recently were seen in the hospital.  Wound Care:  Please apply betadine to the right foot followed by 4x4 gauze, and yajaira  Weight bearing: Please weight bear as tolerated in a surgical shoe.  Antibiotics: Please continue as instructed.    APPTS ARE READY TO BE MADE: [ x] YES    Best Family or Patient Contact (if needed):    Additional Information about above appointments (if needed):    1:   2:   3:     Other comments or requests:    Podiatry Discharge Instructions:  Follow up: Please follow up with Dr. Haider within 1 week of discharge from the hospital, please call [phone] for appointment and discuss that you recently were seen in the hospital.  Wound Care:  Please apply betadine to the right foot followed by 4x4 gauze, and yajaira  Weight bearing: Please weight bear as tolerated in a surgical shoe.  Antibiotics: Please continue as instructed.    APPTS ARE READY TO BE MADE: [ x] YES    Best Family or Patient Contact (if needed):    Additional Information about above appointments (if needed):    1:   2:   3:     Other comments or requests:    Podiatry Discharge Instructions:  Follow up: Please follow up with Dr. Haider within 1 week of discharge from the hospital, please call [phone] for appointment and discuss that you recently were seen in the hospital.  Wound Care:  Please apply betadine to the right foot followed by 4x4 gauze, and yajaira  Weight bearing: Please weight bear as tolerated in a surgical shoe.  Antibiotics: Please continue as instructed.    APPTS ARE READY TO BE MADE: [ x] YES    Best Family or Patient Contact (if needed):    Additional Information about above appointments (if needed):    1:   2:   3:     Other comments or requests:       Patient is being discharged to [facility]. Patient/caregiver will arrange follow up appointments.  Podiatry Discharge Instructions:  Follow up: Please follow up with Dr. Haider within 1 week of discharge from the hospital, please call [phone] for appointment and discuss that you recently were seen in the hospital.  Wound Care:  Please apply betadine to the right foot followed by 4x4 gauze, and yajaira  Weight bearing: Please weight bear as tolerated in a surgical shoe.  Antibiotics: Please continue as instructed.    APPTS ARE READY TO BE MADE: [ x] YES    Best Family or Patient Contact (if needed):    Additional Information about above appointments (if needed):    1:   2:   3:     Other comments or requests:       Patient is being discharged to [facility]. Patient/caregiver will arrange follow up appointments.  Podiatry Discharge Instructions:  Follow up: Please follow up with Dr. Haider within 1 week of discharge from the hospital, please call [phone] for appointment and discuss that you recently were seen in the hospital.  Wound Care:  Please apply betadine to the right foot followed by 4x4 gauze, and yajaira  Weight bearing: Please weight bear as tolerated in a surgical shoe.  Antibiotics: Please continue as instructed.    APPTS ARE READY TO BE MADE: [ x] YES    Best Family or Patient Contact (if needed):    Additional Information about above appointments (if needed):    1:   2:   3:     Other comments or requests:       Patient is being discharged to [facility]. Patient/caregiver will arrange follow up appointments.      APPTS ARE READY TO BE MADE: [ x] YES    Best Family or Patient Contact (if needed):    Additional Information about above appointments (if needed):    1: PCP  2: Vascular surgery  3: Cardiology  3: Wound care - Wound Center [address] [facility] [phone]  4: Podiatry: Podiatry Discharge Instructions:  Follow up: Please follow up with Dr. Haider within 1 week of discharge from the hospital, please call [phone] for appointment and discuss that you recently were seen in the hospital.  Wound Care:  Please apply betadine to the right foot followed by 4x4 gauze, and yajaira  Weight bearing: Please weight bear as tolerated in a surgical shoe.  Antibiotics: Please continue as instructed.    Other comments or requests:       Patient is being discharged to [facility]. Patient/caregiver will arrange follow up appointments.      APPTS ARE READY TO BE MADE: [ x] YES    Best Family or Patient Contact (if needed):    Additional Information about above appointments (if needed):    1: PCP  2: Vascular surgery  3: Cardiology  3: Wound care - Wound Center [address] [facility] [phone]  4: Podiatry: Podiatry Discharge Instructions:  Follow up: Please follow up with Dr. Haider within 1 week of discharge from the hospital, please call [phone] for appointment and discuss that you recently were seen in the hospital.  Wound Care:  Please apply betadine to the right foot followed by 4x4 gauze, and yajaira  Weight bearing: Please weight bear as tolerated in a surgical shoe.  Antibiotics: Please continue as instructed.    Other comments or requests:       Patient is being discharged to [facility]. Patient/caregiver will arrange follow up appointments.      APPTS ARE READY TO BE MADE: [ x] YES    Best Family or Patient Contact (if needed):    Additional Information about above appointments (if needed):    1: PCP  2: Vascular surgery  3: Cardiology  3: Wound care - Wound Center [address] [facility] [phone]  4: Podiatry: Podiatry Discharge Instructions:  Follow up: Please follow up with Dr. Haider within 1 week of discharge from the hospital, please call [phone] for appointment and discuss that you recently were seen in the hospital.  Wound Care:  Please apply betadine to the right foot followed by 4x4 gauze, and yajaira  Weight bearing: Please weight bear as tolerated in a surgical shoe.  Antibiotics: Please continue as instructed.    Other comments or requests:       Patient is being discharged to [facility]. Patient/caregiver will arrange follow up appointments.

## 2023-11-17 NOTE — DIETITIAN INITIAL EVALUATION ADULT - OTHER INFO
-- On Abx for bacteremia, pending right BKA  -- Most recent lab on 11/17 revealed abnormal BUN 36H, Cr 3.35H, GFR 30L, Ca 7.5L, Na 130L. dx OLIVER. s/p IVF NaCl 0.9% bolus on 11/15, on IVF Lactated Ringers @ 150ml/hr since 11/15.  -- Pt is on ISS (Lispro) to regulate blood glucose while in house.

## 2023-11-17 NOTE — DIETITIAN INITIAL EVALUATION ADULT - NSFNSGIASSESSMENTFT_GEN_A_CORE
Denies nausea/vomiting/constipation/diarrhea. Last BM was few days PTA. not on any bowel regimen, will monitor GI.

## 2023-11-17 NOTE — DISCHARGE NOTE PROVIDER - NSDCMRMEDTOKEN_GEN_ALL_CORE_FT
amLODIPine 5 mg oral tablet: 1 tab(s) orally once a day  Cleocin HCl 300 mg oral capsule: 1 cap(s) orally every 6 hours  insulin glargine: 10 unit(s) subcutaneous once a day  metFORMIN 1000 mg oral tablet: 1 tab(s) orally 2 times a day   acetaminophen 325 mg oral tablet: 2 tab(s) orally every 6 hours As needed Temp greater or equal to 38C (100.4F)  amLODIPine 5 mg oral tablet: 1 tab(s) orally once a day  DULoxetine 30 mg oral delayed release capsule: 1 cap(s) orally once a day  enoxaparin: 40 milligram(s) subcutaneous once a day  gabapentin 300 mg oral capsule: 1 cap(s) orally 3 times a day  insulin glargine: 8 unit(s) subcutaneous once a day Home dose reduced to 8 units - was only on insulin sliding scale during hospital admission- FS range from 160s-300s  insulin lispro 100 units/mL injectable solution: 1 unit(s) injectable once a day (at bedtime) 0 Unit(s) if Glucose 0 - 250  1 Unit(s) if Glucose 251 - 300  2 Unit(s) if Glucose 301 - 350  3 Unit(s) if Glucose 351 - 400  4 Unit(s) if Glucose Greater Than 400  insulin lispro 100 units/mL injectable solution: 1 unit(s) injectable 3 times a day 1 Unit(s) if Glucose 151 - 200  2 Unit(s) if Glucose 201 - 250  3 Unit(s) if Glucose 251 - 300  4 Unit(s) if Glucose 301 - 350  5 Unit(s) if Glucose 351 - 400  6 Unit(s) if Glucose Greater Than 400  lisinopril 20 mg oral tablet: 1 tab(s) orally once a day  oxyCODONE 5 mg oral tablet: 1 tab(s) orally every 6 hours As needed Severe Pain (7 - 10)  polyethylene glycol 3350 oral powder for reconstitution: 17 gram(s) orally once a day  senna leaf extract oral tablet: 2 tab(s) orally once a day (at bedtime)  sulfamethoxazole-trimethoprim 800 mg-160 mg oral tablet: 1 tab(s) orally 2 times a day for 7 days total- last day on 12/13/23  tamsulosin 0.4 mg oral capsule: 1 cap(s) orally once a day (at bedtime)

## 2023-11-17 NOTE — DIETITIAN INITIAL EVALUATION ADULT - ORAL INTAKE PTA/DIET HISTORY
Pt reports poor appetite/PO intake <75% of foods provided for the past 2/3 weeks due to medical condition causing hospitalization and was on rehab for 1 week prior to this admission.   No transfer paper from skilled nursing facility in chart.  Not on any therapeutic restriction while at home  Denies difficulty chewing/swallowing   Denies nausea/vomiting/constipation/diarrhea.   Confirms NKFA/intolerance  Micronutrient/Other supplementation: none  Protein-energy supplementation: pt states he was getting oral nutrition supplements while in skilled nursing facility but does not remember the name

## 2023-11-17 NOTE — DIETITIAN INITIAL EVALUATION ADULT - PHYSCIAL ASSESSMENT
Drug Dosing Weight  Height (cm): 170.2 (15 Nov 2023 18:28)  Weight (kg): 64.7 (15 Nov 2023 18:28)  BMI (kg/m2): 22.3 (15 Nov 2023 18:28)    Daily wt (standing or bed scale): none documented thus far   UBW: 150lb, endorses wt loss in 2/3 weeks due to medical condition and decreased PO intake in hospital/rehab   Wt history from previous RD notes: no history   Wt history per [facility] HIE: 72.6kg (4/19/23), 75.8kg (11/21/22)    ** -7.4lb/4.9% in 2/3 weeks PTA, -24.4lb/14.6% in 1 year. Will continue to monitor weight trends as available/able.     IBW: 148lb, 96% IBW

## 2023-11-17 NOTE — CHART NOTE - NSCHARTNOTEFT_GEN_A_CORE
consulted to see pt w/ foot/leg wounds,  pt seen by dpm & vascular.  d/w team who is agreeable to defer to dpm.  remain available as requested.

## 2023-11-17 NOTE — PROGRESS NOTE ADULT - ASSESSMENT
62 y/o male with pmhx htn, dm, hld  presents to the ED sent in from rehab center for right foot discoloration and pain  Hyponatremia   Metabolic acidosis   Wedge-shaped hypoattenuating focus of the left kidney concerning for renal infarct or focus of  infection/pyelonephritis.  Distended bladder without hydro  OLIVER following contrast   High grade bacteremia       1 Renal - Likely BHAKTI at present.  Supportive care   Check bladder scan   Eventual renal dopplers to assess possible infarct vs infection   2 Vasc-Amputation per vasc;  Needs source control   3 ID -Infectious disease is following and awaiting sensitivities      Prognosis is guarded.  To be clear he was refusing surgery up until yesterday   DW Hospitalist and Vasc surgery     >60 minutes spent on total encounter and more then 50% of the visit was spent counseling and/or coordinating care by the attending physician     [facility]   [phone]

## 2023-11-17 NOTE — DIETITIAN INITIAL EVALUATION ADULT - ADD RECOMMEND
-- Recommend MVI, pending no medical contraindications, for micronutrient support and to aid wound healing.   -- Monitor PO intake, GI tolerance, skin integrity, labs, weight, and bowel movement regularity.   -- Will continue to honor food and beverage preferences within diet restriction of patient in an effort to maximize level of nutrient intake.  -- Assist with meals PRN and encourage PO intake.  -- Malnutrition alert placed in chart.

## 2023-11-17 NOTE — DIETITIAN INITIAL EVALUATION ADULT - PERTINENT LABORATORY DATA
11-17    130<L>  |  99  |  36<H>  ----------------------------<  138<H>  4.7   |  17<L>  |  3.35<H>    Ca    7.5<L>      17 Nov 2023 06:49    TPro  5.2<L>  /  Alb  2.1<L>  /  TBili  0.5  /  DBili  x   /  AST  42<H>  /  ALT  25  /  AlkPhos  79  11-17    CAPILLARY BLOOD GLUCOSE  POCT Blood Glucose.: 163 mg/dL (17 Nov 2023 08:51)  POCT Blood Glucose.: 144 mg/dL (16 Nov 2023 21:47)  POCT Blood Glucose.: 240 mg/dL (16 Nov 2023 17:30)

## 2023-11-17 NOTE — DIETITIAN INITIAL EVALUATION ADULT - PERTINENT MEDS FT
MEDICATIONS  (STANDING):  amLODIPine   Tablet 5 milliGRAM(s) Oral daily  chlorhexidine 2% Cloths 1 Application(s) Topical daily  dextrose 5%. 1000 milliLiter(s) (50 mL/Hr) IV Continuous <Continuous>  dextrose 5%. 1000 milliLiter(s) (100 mL/Hr) IV Continuous <Continuous>  dextrose 50% Injectable 25 Gram(s) IV Push once  dextrose 50% Injectable 12.5 Gram(s) IV Push once  dextrose 50% Injectable 25 Gram(s) IV Push once  glucagon  Injectable 1 milliGRAM(s) IntraMuscular once  heparin  Infusion.  Unit(s)/Hr (11 mL/Hr) IV Continuous <Continuous>  influenza   Vaccine 0.5 milliLiter(s) IntraMuscular once  insulin lispro (ADMELOG) corrective regimen sliding scale   SubCutaneous three times a day before meals  insulin lispro (ADMELOG) corrective regimen sliding scale   SubCutaneous at bedtime  lactated ringers. 1000 milliLiter(s) (150 mL/Hr) IV Continuous <Continuous>  vancomycin  IVPB      vancomycin  IVPB 1000 milliGRAM(s) IV Intermittent every 24 hours    MEDICATIONS  (PRN):  acetaminophen     Tablet .. 650 milliGRAM(s) Oral every 6 hours PRN Temp greater or equal to 38C (100.4F)  dextrose Oral Gel 15 Gram(s) Oral once PRN Blood Glucose LESS THAN 70 milliGRAM(s)/deciliter  heparin   Injectable 5000 Unit(s) IV Push every 6 hours PRN For aPTT less than 40  heparin   Injectable 2500 Unit(s) IV Push every 6 hours PRN For aPTT between 40 - 57

## 2023-11-17 NOTE — PROGRESS NOTE ADULT - ASSESSMENT
63M presents with right foot partial thickness dry gangrene digits 1-5, dorsal medial midfoot to anterior medial ankle ischemic changes  - Patient seen and evaluated  - Afebrile, WBC 16.53, ESR 59, CRP 12.1  - Right foot partial thickness dry gangrene digits 1-5, dorsal medial midfoot to anterior medial ankle ischemic changes, no pus, no tracking or tunneling, no malodor, no bogginess or fluctuance.   - Right lower extremity xray: no OM, no gas  - No culture 2/2 like contain skin contaminant  - Vasc recs appreciated  - Strict precaution with Z-flows to be worn at all times when in bed or resting in chair  - Pt is amenable to R BKA  - No further podiatric intervention necessary stable for discharge from podiatry standpoint, please reconsult as needed   - Wound care instructions and discharge info are listed in the provider discharge note  - Seen with attending

## 2023-11-17 NOTE — DISCHARGE NOTE PROVIDER - PROVIDER TOKENS
PROVIDER:[[id number]],FOLLOWUP:[1 week],ESTABLISHEDPATIENT:[T]] PROVIDER:[[id number]],FOLLOWUP:[1 week],ESTABLISHEDPATIENT:[T]],PROVIDER:[TOKEN:[[id number]],FOLLOWUP:[2 weeks]],PROVIDER:[TOKEN:[[id number]],FOLLOWUP:[1 week]] PROVIDER:[[id number]],FOLLOWUP:[1 week],ESTABLISHEDPATIENT:[T]],PROVIDER:[TOKEN:[[id number]],FOLLOWUP:[2 weeks]],PROVIDER:[TOKEN:[[id number]],FOLLOWUP:[1 week]] PROVIDER:[TOKEN:[[id number]],FOLLOWUP:[1 week],ESTABLISHEDPATIENT:[T]],PROVIDER:[TOKEN:[[id number]],FOLLOWUP:[2 weeks]],PROVIDER:[TOKEN:[[id number]],FOLLOWUP:[1 week]]

## 2023-11-17 NOTE — DISCHARGE NOTE PROVIDER - HOSPITAL COURSE
HPI:  64 y/o male with pmhx htn, dm, hld  presents to the ED sent in from rehab center for right foot discoloration and pain. Patient states that he was discharged about 1 week ago from outside hospital after being treated for infection to right foot. He has been in rehab center for about 1 week. States today nurses looked at his foot and sent him to [facility] ED for evaluation. Patient has not been on any abx for foot while in rehab.   No hx  fevers, chills, chest pain, cough, n/v/d. 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 Pt completed course of vancomycin and is s/p wound vac.  Pt noted w loose teeth- s/p extraction of tooth #25 with dental on 11/26  Blood cultures were positive for staph bacteremia- s/p IV Vanco, MARINO- EF 55-60 %, no vegetation noted.  For OLIVER/metabolic acidosis, renal consulted and pt treated with bicarb. A Renal US was done suggesting renal disease.  For Urinary retention- Li placed 11/21, failed TOV 12/5, li replaced. Can try TOV at [facility].  For depression duloxetine started   UA positive for UTI 12/6- penicillin allergy so will be dc'ed on bactrim DS x7 days     Important Medication Changes and Reason: bactrim DS x 7 days    Active or Pending Issues Requiring Follow-up: f/u PCP, f/u cardiology    Advanced Directives:   [x] Full code  [ ] DNR  [ ] Hospice    Discharge Diagnoses: Right foot gangrene, Bacteremia, OLIVER, urinary retention, depression, UTI         HPI:  62 y/o male with pmhx htn, dm, hld  presents to the ED sent in from rehab center for right foot discoloration and pain. Patient states that he was discharged about 1 week ago from outside hospital after being treated for infection to right foot. He has been in rehab center for about 1 week. States today nurses looked at his foot and sent him to [facility] ED for evaluation. Patient has not been on any abx for foot while in rehab.   No hx  fevers, chills, chest pain, cough, n/v/d. 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 Pt completed course of vancomycin and is s/p wound vac.  Pt noted w loose teeth- s/p extraction of tooth #25 with dental on 11/26  Blood cultures were positive for staph bacteremia- s/p IV Vanco, MARINO- EF 55-60 %, no vegetation noted.  For OLIVER/metabolic acidosis, renal consulted and pt treated with bicarb. A Renal US was done suggesting renal disease.  For Urinary retention- Li placed 11/21, failed TOV 12/5, li replaced. Can try TOV at [facility].  For depression duloxetine started   UA positive for UTI 12/6- penicillin allergy so will be dc'ed on bactrim DS x7 days     Important Medication Changes and Reason: bactrim DS x 7 days    Active or Pending Issues Requiring Follow-up: f/u PCP, f/u cardiology    Advanced Directives:   [x] Full code  [ ] DNR  [ ] Hospice    Discharge Diagnoses: Right foot gangrene, Bacteremia, OLIVER, urinary retention, depression, UTI         HPI:  62 y/o male with pmhx htn, dm, hld  presents to the ED sent in from rehab center for right foot discoloration and pain. Patient states that he was discharged about 1 week ago from outside hospital after being treated for infection to right foot. He has been in rehab center for about 1 week. States today nurses looked at his foot and sent him to [facility] ED for evaluation. Patient has not been on any abx for foot while in rehab.   No hx  fevers, chills, chest pain, cough, n/v/d. 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 Pt completed course of vancomycin and is s/p wound vac.  Pt noted w loose teeth- s/p extraction of tooth #25 with dental on 11/26  Blood cultures were positive for staph bacteremia- s/p IV Vanco, MARINO- EF 55-60 %, no vegetation noted.  For OLIVER/metabolic acidosis, renal consulted and pt treated with bicarb. A Renal US was done suggesting renal disease.  For Urinary retention- Li placed 11/21, failed TOV 12/5, li replaced. Can try TOV at [facility].  For depression duloxetine started   UA positive for UTI 12/6- penicillin allergy so will be dc'ed on bactrim DS x7 days     Important Medication Changes and Reason: bactrim DS x 7 days    Active or Pending Issues Requiring Follow-up: f/u PCP, f/u cardiology    Advanced Directives:   [x] Full code  [ ] DNR  [ ] Hospice    Discharge Diagnoses: Right foot gangrene, Bacteremia, OLIVER, urinary retention, depression, UTI         HPI:  62 y/o male with pmhx htn, dm, hld  presents to the ED sent in from rehab center for right foot discoloration and pain. Patient states that he was discharged about 1 week ago from outside hospital after being treated for infection to right foot. He has been in rehab center for about 1 week. States today nurses looked at his foot and sent him to [facility] ED for evaluation. Patient has not been on any abx for foot while in rehab.   No hx  fevers, chills, chest pain, cough, n/v/d. 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 Pt completed course of vancomycin as blood cultures were positive for staph bacteremia, MARINO- EF 55-60 %, no vegetation noted. He had a wound vac. He is to have Daily dressing changes with Xeroform, gauze, and tape or Tegaderm, follow up with wound care and vascular surgery upon discharge.   Hospital course c/b hyponatremia, metabolic acidosis, treated with bicarb gtt, A Renal US was done suggesting renal disease, Li placed 11/21 for urinary retention, failed TOV 12/5, li replaced. Can try TOV at [facility]. He was treated with Ceftriaxone for UTI and transitioned to augmentin x 7 days til 12/13 (unable to give penicillin due to anaphylaxis allergy) , and hypertension, controlled with amlodipine and lisinopril. Of note, they were seen by dental w loose teeth- s/p extraction of tooth #25 with dental on 11/26 and is to follow up with dental outpatient.   For depression duloxetine started   Patient seen by wound care for management of Sacral unstageable pressure injury- is to follow up outpatient.   Patient is medically cleared for discharge on 12/8/23 with med rec reviewed and revised with Dr. Rivas.     Follow ups:   Wound care  Dental- F/U with outpatient dentist for comprehensive dental care upon discharge.  Vascular with Dr. Flynn - staples to remain in place until follow up with vascular     Important Medication Changes and Reason: bactrim DS x 7 days for UTI, depression duloxetine started, oxycodone for pain control, Lantus home dose reduced from 10 units to 8 units per Dr. Rivas (only on sliding scale during hospital admission and patients FS 160s to 300s) - metformin dc'ed, Tamsulosin for BPH    Active or Pending Issues Requiring Follow-up: f/u PCP, f/u cardiology    Advanced Directives:   [x] Full code  [ ] DNR  [ ] Hospice    Discharge Diagnoses: Right foot gangrene, Bacteremia, OLIVER, urinary retention, depression, UTI         HPI:  64 y/o male with pmhx htn, dm, hld  presents to the ED sent in from rehab center for right foot discoloration and pain. Patient states that he was discharged about 1 week ago from outside hospital after being treated for infection to right foot. He has been in rehab center for about 1 week. States today nurses looked at his foot and sent him to [facility] ED for evaluation. Patient has not been on any abx for foot while in rehab.   No hx  fevers, chills, chest pain, cough, n/v/d. 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 Pt completed course of vancomycin as blood cultures were positive for staph bacteremia, MARINO- EF 55-60 %, no vegetation noted. He had a wound vac. He is to have Daily dressing changes with Xeroform, gauze, and tape or Tegaderm, follow up with wound care and vascular surgery upon discharge.   Hospital course c/b hyponatremia, metabolic acidosis, treated with bicarb gtt, A Renal US was done suggesting renal disease, Li placed 11/21 for urinary retention, failed TOV 12/5, li replaced. Can try TOV at [facility]. He was treated with Ceftriaxone for UTI and transitioned to augmentin x 7 days til 12/13 (unable to give penicillin due to anaphylaxis allergy) , and hypertension, controlled with amlodipine and lisinopril. Of note, they were seen by dental w loose teeth- s/p extraction of tooth #25 with dental on 11/26 and is to follow up with dental outpatient.   For depression duloxetine started   Patient seen by wound care for management of Sacral unstageable pressure injury- is to follow up outpatient.   Patient is medically cleared for discharge on 12/8/23 with med rec reviewed and revised with Dr. Rivas.     Follow ups:   Wound care  Dental- F/U with outpatient dentist for comprehensive dental care upon discharge.  Vascular with Dr. Flynn - staples to remain in place until follow up with vascular     Important Medication Changes and Reason: bactrim DS x 7 days for UTI, depression duloxetine started, oxycodone for pain control, Lantus home dose reduced from 10 units to 8 units per Dr. Rivas (only on sliding scale during hospital admission and patients FS 160s to 300s) - metformin dc'ed, Tamsulosin for BPH    Active or Pending Issues Requiring Follow-up: f/u PCP, f/u cardiology    Advanced Directives:   [x] Full code  [ ] DNR  [ ] Hospice    Discharge Diagnoses: Right foot gangrene, Bacteremia, OLIVER, urinary retention, depression, UTI         HPI:  64 y/o male with pmhx htn, dm, hld  presents to the ED sent in from rehab center for right foot discoloration and pain. Patient states that he was discharged about 1 week ago from outside hospital after being treated for infection to right foot. He has been in rehab center for about 1 week. States today nurses looked at his foot and sent him to [facility] ED for evaluation. Patient has not been on any abx for foot while in rehab.   No hx  fevers, chills, chest pain, cough, n/v/d. 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 Pt completed course of vancomycin as blood cultures were positive for staph bacteremia, MARINO- EF 55-60 %, no vegetation noted. He had a wound vac. He is to have Daily dressing changes with Xeroform, gauze, and tape or Tegaderm, follow up with wound care and vascular surgery upon discharge.   Hospital course c/b hyponatremia, metabolic acidosis, treated with bicarb gtt, A Renal US was done suggesting renal disease, Li placed 11/21 for urinary retention, failed TOV 12/5, li replaced. Can try TOV at [facility]. He was treated with Ceftriaxone for UTI and transitioned to augmentin x 7 days til 12/13 (unable to give penicillin due to anaphylaxis allergy) , and hypertension, controlled with amlodipine and lisinopril. Of note, they were seen by dental w loose teeth- s/p extraction of tooth #25 with dental on 11/26 and is to follow up with dental outpatient.   For depression duloxetine started   Patient seen by wound care for management of Sacral unstageable pressure injury- is to follow up outpatient.   Patient is medically cleared for discharge on 12/8/23 with med rec reviewed and revised with Dr. Rivas.     Follow ups:   Wound care  Dental- F/U with outpatient dentist for comprehensive dental care upon discharge.  Vascular with Dr. Flynn - staples to remain in place until follow up with vascular     Important Medication Changes and Reason: bactrim DS x 7 days for UTI, depression duloxetine started, oxycodone for pain control, Lantus home dose reduced from 10 units to 8 units per Dr. Rivas (only on sliding scale during hospital admission and patients FS 160s to 300s) - metformin dc'ed, Tamsulosin for BPH    Active or Pending Issues Requiring Follow-up: f/u PCP, f/u cardiology    Advanced Directives:   [x] Full code  [ ] DNR  [ ] Hospice    Discharge Diagnoses: Right foot gangrene, Bacteremia, OLIVER, urinary retention, depression, UTI         HPI:  62 y/o male with pmhx htn, dm, hld  presents to the ED sent in from rehab center for right foot discoloration and pain. Patient states that he was discharged about 1 week ago from outside hospital after being treated for infection to right foot. He has been in rehab center for about 1 week. States today nurses looked at his foot and sent him to [facility] ED for evaluation. Patient has not been on any abx for foot while in rehab.   No hx  fevers, chills, chest pain, cough, n/v/d. 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 Pt completed course of vancomycin as blood cultures were positive for staph bacteremia, MARINO- EF 55-60 %, no vegetation noted. He had a wound vac. He is to have Daily dressing changes with Xeroform, gauze, and tape or Tegaderm, follow up with wound care and vascular surgery upon discharge.   Hospital course c/b hyponatremia, metabolic acidosis, treated with bicarb gtt, A Renal US was done suggesting renal disease, Li placed 11/21 for urinary retention, failed TOV 12/5, li replaced. Can try TOV at [facility]. He was treated with Ceftriaxone for UTI and transitioned to augmentin x 7 days til 12/13 (unable to give penicillin due to anaphylaxis allergy) , and hypertension, controlled with amlodipine and lisinopril. Of note, they were seen by dental w loose teeth- s/p extraction of tooth #25 with dental on 11/26 and is to follow up with dental outpatient.   For depression duloxetine started   Patient seen by wound care for management of Sacral unstageable pressure injury- is to follow up outpatient.   Patient is medically cleared for discharge on 12/8/23 with med rec reviewed and revised with Dr. Rivas.     Follow ups:   Wound care  Dental- F/U with outpatient dentist for comprehensive dental care upon discharge.  Vascular with Dr. Flynn - staples to remain in place until follow up with vascular     Important Medication Changes and Reason: bactrim DS x 7 days for UTI, depression duloxetine started, oxycodone for pain control, Lantus home dose reduced from 10 units to 8 units per Dr. Rivas (only on sliding scale during hospital admission and patients FS 160s to 300s) - metformin dc'ed, Tamsulosin for BPH, lisinopril and amlodipine for BP control     Active or Pending Issues Requiring Follow-up: f/u PCP, f/u cardiology    Advanced Directives:   [x] Full code  [ ] DNR  [ ] Hospice    Discharge Diagnoses: Right foot gangrene, Bacteremia, OLIVER, urinary retention, depression, UTI         HPI:  64 y/o male with pmhx htn, dm, hld  presents to the ED sent in from rehab center for right foot discoloration and pain. Patient states that he was discharged about 1 week ago from outside hospital after being treated for infection to right foot. He has been in rehab center for about 1 week. States today nurses looked at his foot and sent him to [facility] ED for evaluation. Patient has not been on any abx for foot while in rehab.   No hx  fevers, chills, chest pain, cough, n/v/d. 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 Pt completed course of vancomycin as blood cultures were positive for staph bacteremia, MARINO- EF 55-60 %, no vegetation noted. He had a wound vac. He is to have Daily dressing changes with Xeroform, gauze, and tape or Tegaderm, follow up with wound care and vascular surgery upon discharge.   Hospital course c/b hyponatremia, metabolic acidosis, treated with bicarb gtt, A Renal US was done suggesting renal disease, Li placed 11/21 for urinary retention, failed TOV 12/5, li replaced. Can try TOV at [facility]. He was treated with Ceftriaxone for UTI and transitioned to augmentin x 7 days til 12/13 (unable to give penicillin due to anaphylaxis allergy) , and hypertension, controlled with amlodipine and lisinopril. Of note, they were seen by dental w loose teeth- s/p extraction of tooth #25 with dental on 11/26 and is to follow up with dental outpatient.   For depression duloxetine started   Patient seen by wound care for management of Sacral unstageable pressure injury- is to follow up outpatient.   Patient is medically cleared for discharge on 12/8/23 with med rec reviewed and revised with Dr. Rivas.     Follow ups:   Wound care  Dental- F/U with outpatient dentist for comprehensive dental care upon discharge.  Vascular with Dr. Flynn - staples to remain in place until follow up with vascular     Important Medication Changes and Reason: bactrim DS x 7 days for UTI, depression duloxetine started, oxycodone for pain control, Lantus home dose reduced from 10 units to 8 units per Dr. Rivas (only on sliding scale during hospital admission and patients FS 160s to 300s) - metformin dc'ed, Tamsulosin for BPH, lisinopril and amlodipine for BP control     Active or Pending Issues Requiring Follow-up: f/u PCP, f/u cardiology    Advanced Directives:   [x] Full code  [ ] DNR  [ ] Hospice    Discharge Diagnoses: Right foot gangrene, Bacteremia, OLIVER, urinary retention, depression, UTI         HPI:  64 y/o male with pmhx htn, dm, hld  presents to the ED sent in from rehab center for right foot discoloration and pain. Patient states that he was discharged about 1 week ago from outside hospital after being treated for infection to right foot. He has been in rehab center for about 1 week. States today nurses looked at his foot and sent him to [facility] ED for evaluation. Patient has not been on any abx for foot while in rehab.   No hx  fevers, chills, chest pain, cough, n/v/d. 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 Pt completed course of vancomycin as blood cultures were positive for staph bacteremia, MARINO- EF 55-60 %, no vegetation noted. He had a wound vac. He is to have Daily dressing changes with Xeroform, gauze, and tape or Tegaderm, follow up with wound care and vascular surgery upon discharge.   Hospital course c/b hyponatremia, metabolic acidosis, treated with bicarb gtt, A Renal US was done suggesting renal disease, Li placed 11/21 for urinary retention, failed TOV 12/5, li replaced. Can try TOV at [facility]. He was treated with Ceftriaxone for UTI and transitioned to augmentin x 7 days til 12/13 (unable to give penicillin due to anaphylaxis allergy) , and hypertension, controlled with amlodipine and lisinopril. Of note, they were seen by dental w loose teeth- s/p extraction of tooth #25 with dental on 11/26 and is to follow up with dental outpatient.   For depression duloxetine started   Patient seen by wound care for management of Sacral unstageable pressure injury- is to follow up outpatient.   Patient is medically cleared for discharge on 12/8/23 with med rec reviewed and revised with Dr. Rivas.     Follow ups:   Wound care  Dental- F/U with outpatient dentist for comprehensive dental care upon discharge.  Vascular with Dr. Flynn - staples to remain in place until follow up with vascular     Important Medication Changes and Reason: bactrim DS x 7 days for UTI, depression duloxetine started, oxycodone for pain control, Lantus home dose reduced from 10 units to 8 units per Dr. Rivas (only on sliding scale during hospital admission and patients FS 160s to 300s) - metformin dc'ed, Tamsulosin for BPH, lisinopril and amlodipine for BP control     Active or Pending Issues Requiring Follow-up: f/u PCP, f/u cardiology    Advanced Directives:   [x] Full code  [ ] DNR  [ ] Hospice    Discharge Diagnoses: Right foot gangrene, Bacteremia, OLIVER, urinary retention, depression, UTI

## 2023-11-17 NOTE — DISCHARGE NOTE PROVIDER - DETAILS OF MALNUTRITION DIAGNOSIS/DIAGNOSES
This patient has been assessed with a concern for Malnutrition and was treated during this hospitalization for the following Nutrition diagnosis/diagnoses:     -  11/17/2023: Moderate protein-calorie malnutrition

## 2023-11-17 NOTE — PROGRESS NOTE ADULT - ASSESSMENT
63M PMHx HTN, DM. HLD, presents to ED from rehab for acute limb ischemia. Patient was discharged about 1 week ago from OSH to rehab for conservative medical treatment of foot infection and now brought to ED for increased pain and acute discoloration of foot. Does not weightbare on right leg and has not ambulated in months.     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   VBG notable for elevated lactated of 2.1    Vascular consulted for evaluation of acute limb ischemia       Plan:  - Patient now amenable to BKA. Plan for OR pending medical and cardiac optimization/risk stratification/clearance  - recommend continuing hep gtt  - f/u podiatry local wound care recommendations  - vascular surgery to follow, page with any questions/concerns      Vascular surgery   [id number]

## 2023-11-17 NOTE — PROGRESS NOTE ADULT - ASSESSMENT
Suspect CNS in blood culture contaminant from single phlebotomy  LE changes do not look primarily infectious  Doubt cardioembolic disease in this setting (eg endocarditis)  Denies any symptoms PTA of infection  If there is infection in the foot, antibiotics will not get to the target given absent perfusion  Leukocytosis likely reactive to ischemia  Patient is in pain from ischemia  D/W patient, he's amenable now to talking with surgery again re: BKA    11/17: Still not entirely convinced this coagulase-negative staphylococcal bacteremia represents true infection, however no objection to continuing vancomycin as dosed by renal.  He has acute kidney injury likely secondary to contrast.  If this is a true bacteremia related to his ischemic limb, source control remains the key issue–which in this case is below-knee amputation.  At this point in time there is no ID contraindication to proposed surgery.    Suggestions--  F/U Cx data  Vanco dosing as per renal, target trough 10-15 (this is not MRSA)  Await BKA  Management of OLIVER per renal    If any ID input is needed over the weekend, please call [phone]. Thanks.    Ayaz Morales MD  Attending Physician  [facility]  Division of Infectious Diseases  [phone]

## 2023-11-18 ENCOUNTER — TRANSCRIPTION ENCOUNTER (OUTPATIENT)
Age: 63
End: 2023-11-18

## 2023-11-18 LAB
-  AMPICILLIN/SULBACTAM: SIGNIFICANT CHANGE UP
-  AMPICILLIN/SULBACTAM: SIGNIFICANT CHANGE UP
-  CEFAZOLIN: SIGNIFICANT CHANGE UP
-  CEFAZOLIN: SIGNIFICANT CHANGE UP
-  CLINDAMYCIN: SIGNIFICANT CHANGE UP
-  CLINDAMYCIN: SIGNIFICANT CHANGE UP
-  ERYTHROMYCIN: SIGNIFICANT CHANGE UP
-  ERYTHROMYCIN: SIGNIFICANT CHANGE UP
-  GENTAMICIN: SIGNIFICANT CHANGE UP
-  GENTAMICIN: SIGNIFICANT CHANGE UP
-  OXACILLIN: SIGNIFICANT CHANGE UP
-  OXACILLIN: SIGNIFICANT CHANGE UP
-  PENICILLIN: SIGNIFICANT CHANGE UP
-  PENICILLIN: SIGNIFICANT CHANGE UP
-  RIFAMPIN: SIGNIFICANT CHANGE UP
-  RIFAMPIN: SIGNIFICANT CHANGE UP
-  TETRACYCLINE: SIGNIFICANT CHANGE UP
-  TETRACYCLINE: SIGNIFICANT CHANGE UP
-  TRIMETHOPRIM/SULFAMETHOXAZOLE: SIGNIFICANT CHANGE UP
-  TRIMETHOPRIM/SULFAMETHOXAZOLE: SIGNIFICANT CHANGE UP
-  VANCOMYCIN: SIGNIFICANT CHANGE UP
-  VANCOMYCIN: SIGNIFICANT CHANGE UP
A1C WITH ESTIMATED AVERAGE GLUCOSE RESULT: 8.8 % — HIGH (ref 4–5.6)
A1C WITH ESTIMATED AVERAGE GLUCOSE RESULT: 8.8 % — HIGH (ref 4–5.6)
APTT BLD: 124.2 SEC — CRITICAL HIGH (ref 24.5–35.6)
APTT BLD: 124.2 SEC — CRITICAL HIGH (ref 24.5–35.6)
APTT BLD: 42.9 SEC — HIGH (ref 24.5–35.6)
APTT BLD: 42.9 SEC — HIGH (ref 24.5–35.6)
APTT BLD: 61.2 SEC — HIGH (ref 24.5–35.6)
APTT BLD: 61.2 SEC — HIGH (ref 24.5–35.6)
BUN SERPL-MCNC: 41 MG/DL — HIGH (ref 7–23)
BUN SERPL-MCNC: 41 MG/DL — HIGH (ref 7–23)
CALCIUM SERPL-MCNC: 8.1 MG/DL — LOW (ref 8.4–10.5)
CALCIUM SERPL-MCNC: 8.1 MG/DL — LOW (ref 8.4–10.5)
CHLORIDE SERPL-SCNC: 97 MMOL/L — SIGNIFICANT CHANGE UP (ref 96–108)
CHLORIDE SERPL-SCNC: 97 MMOL/L — SIGNIFICANT CHANGE UP (ref 96–108)
CO2 SERPL-SCNC: 18 MMOL/L — LOW (ref 22–31)
CO2 SERPL-SCNC: 18 MMOL/L — LOW (ref 22–31)
CREAT SERPL-MCNC: 3.99 MG/DL — HIGH (ref 0.5–1.3)
CREAT SERPL-MCNC: 3.99 MG/DL — HIGH (ref 0.5–1.3)
CULTURE RESULTS: ABNORMAL
EGFR: 16 ML/MIN/1.73M2 — LOW
EGFR: 16 ML/MIN/1.73M2 — LOW
ESTIMATED AVERAGE GLUCOSE: 206 MG/DL — HIGH (ref 68–114)
ESTIMATED AVERAGE GLUCOSE: 206 MG/DL — HIGH (ref 68–114)
GLUCOSE BLDC GLUCOMTR-MCNC: 224 MG/DL — HIGH (ref 70–99)
GLUCOSE BLDC GLUCOMTR-MCNC: 224 MG/DL — HIGH (ref 70–99)
GLUCOSE BLDC GLUCOMTR-MCNC: 229 MG/DL — HIGH (ref 70–99)
GLUCOSE BLDC GLUCOMTR-MCNC: 229 MG/DL — HIGH (ref 70–99)
GLUCOSE BLDC GLUCOMTR-MCNC: 235 MG/DL — HIGH (ref 70–99)
GLUCOSE BLDC GLUCOMTR-MCNC: 235 MG/DL — HIGH (ref 70–99)
GLUCOSE BLDC GLUCOMTR-MCNC: 300 MG/DL — HIGH (ref 70–99)
GLUCOSE BLDC GLUCOMTR-MCNC: 300 MG/DL — HIGH (ref 70–99)
GLUCOSE SERPL-MCNC: 160 MG/DL — HIGH (ref 70–99)
GLUCOSE SERPL-MCNC: 160 MG/DL — HIGH (ref 70–99)
GRAM STN FLD: ABNORMAL
HCT VFR BLD CALC: 28.8 % — LOW (ref 39–50)
HCT VFR BLD CALC: 28.8 % — LOW (ref 39–50)
HGB BLD-MCNC: 9.3 G/DL — LOW (ref 13–17)
HGB BLD-MCNC: 9.3 G/DL — LOW (ref 13–17)
MCHC RBC-ENTMCNC: 24.6 PG — LOW (ref 27–34)
MCHC RBC-ENTMCNC: 24.6 PG — LOW (ref 27–34)
MCHC RBC-ENTMCNC: 32.3 GM/DL — SIGNIFICANT CHANGE UP (ref 32–36)
MCHC RBC-ENTMCNC: 32.3 GM/DL — SIGNIFICANT CHANGE UP (ref 32–36)
MCV RBC AUTO: 76.2 FL — LOW (ref 80–100)
MCV RBC AUTO: 76.2 FL — LOW (ref 80–100)
METHOD TYPE: SIGNIFICANT CHANGE UP
METHOD TYPE: SIGNIFICANT CHANGE UP
NRBC # BLD: 0 /100 WBCS — SIGNIFICANT CHANGE UP (ref 0–0)
NRBC # BLD: 0 /100 WBCS — SIGNIFICANT CHANGE UP (ref 0–0)
ORGANISM # SPEC MICROSCOPIC CNT: ABNORMAL
PLATELET # BLD AUTO: 178 K/UL — SIGNIFICANT CHANGE UP (ref 150–400)
PLATELET # BLD AUTO: 178 K/UL — SIGNIFICANT CHANGE UP (ref 150–400)
POTASSIUM SERPL-MCNC: 5 MMOL/L — SIGNIFICANT CHANGE UP (ref 3.5–5.3)
POTASSIUM SERPL-MCNC: 5 MMOL/L — SIGNIFICANT CHANGE UP (ref 3.5–5.3)
POTASSIUM SERPL-SCNC: 5 MMOL/L — SIGNIFICANT CHANGE UP (ref 3.5–5.3)
POTASSIUM SERPL-SCNC: 5 MMOL/L — SIGNIFICANT CHANGE UP (ref 3.5–5.3)
RBC # BLD: 3.78 M/UL — LOW (ref 4.2–5.8)
RBC # BLD: 3.78 M/UL — LOW (ref 4.2–5.8)
RBC # FLD: 16.7 % — HIGH (ref 10.3–14.5)
RBC # FLD: 16.7 % — HIGH (ref 10.3–14.5)
SODIUM SERPL-SCNC: 128 MMOL/L — LOW (ref 135–145)
SODIUM SERPL-SCNC: 128 MMOL/L — LOW (ref 135–145)
SPECIMEN SOURCE: SIGNIFICANT CHANGE UP
VANCOMYCIN TROUGH SERPL-MCNC: 13.2 UG/ML — SIGNIFICANT CHANGE UP (ref 10–20)
VANCOMYCIN TROUGH SERPL-MCNC: 13.2 UG/ML — SIGNIFICANT CHANGE UP (ref 10–20)
WBC # BLD: 17.78 K/UL — HIGH (ref 3.8–10.5)
WBC # BLD: 17.78 K/UL — HIGH (ref 3.8–10.5)
WBC # FLD AUTO: 17.78 K/UL — HIGH (ref 3.8–10.5)
WBC # FLD AUTO: 17.78 K/UL — HIGH (ref 3.8–10.5)

## 2023-11-18 PROCEDURE — 93010 ELECTROCARDIOGRAM REPORT: CPT

## 2023-11-18 RX ORDER — SODIUM CHLORIDE 9 MG/ML
1000 INJECTION, SOLUTION INTRAVENOUS
Refills: 0 | Status: DISCONTINUED | OUTPATIENT
Start: 2023-11-18 | End: 2023-11-19

## 2023-11-18 RX ORDER — OXYCODONE HYDROCHLORIDE 5 MG/1
5 TABLET ORAL ONCE
Refills: 0 | Status: DISCONTINUED | OUTPATIENT
Start: 2023-11-18 | End: 2023-11-18

## 2023-11-18 RX ORDER — OXYCODONE HYDROCHLORIDE 5 MG/1
5 TABLET ORAL EVERY 6 HOURS
Refills: 0 | Status: DISCONTINUED | OUTPATIENT
Start: 2023-11-18 | End: 2023-11-19

## 2023-11-18 RX ADMIN — OXYCODONE HYDROCHLORIDE 5 MILLIGRAM(S): 5 TABLET ORAL at 17:00

## 2023-11-18 RX ADMIN — HEPARIN SODIUM 1000 UNIT(S)/HR: 5000 INJECTION INTRAVENOUS; SUBCUTANEOUS at 08:06

## 2023-11-18 RX ADMIN — OXYCODONE HYDROCHLORIDE 5 MILLIGRAM(S): 5 TABLET ORAL at 09:20

## 2023-11-18 RX ADMIN — HEPARIN SODIUM 1100 UNIT(S)/HR: 5000 INJECTION INTRAVENOUS; SUBCUTANEOUS at 16:19

## 2023-11-18 RX ADMIN — Medication 2: at 09:25

## 2023-11-18 RX ADMIN — OXYCODONE HYDROCHLORIDE 5 MILLIGRAM(S): 5 TABLET ORAL at 10:00

## 2023-11-18 RX ADMIN — HEPARIN SODIUM 1100 UNIT(S)/HR: 5000 INJECTION INTRAVENOUS; SUBCUTANEOUS at 22:51

## 2023-11-18 RX ADMIN — AMLODIPINE BESYLATE 5 MILLIGRAM(S): 2.5 TABLET ORAL at 05:33

## 2023-11-18 RX ADMIN — Medication 3: at 17:13

## 2023-11-18 RX ADMIN — Medication 250 MILLIGRAM(S): at 16:20

## 2023-11-18 RX ADMIN — OXYCODONE HYDROCHLORIDE 5 MILLIGRAM(S): 5 TABLET ORAL at 16:18

## 2023-11-18 RX ADMIN — Medication 2: at 11:38

## 2023-11-18 RX ADMIN — CHLORHEXIDINE GLUCONATE 1 APPLICATION(S): 213 SOLUTION TOPICAL at 11:39

## 2023-11-18 NOTE — CONSULT NOTE ADULT - SUBJECTIVE AND OBJECTIVE BOX
CHIEF COMPLAINT:Patient is a 63y old  Male who presents with a chief complaint of Rt foot pain (18 Nov 2023 08:23)      HISTORY OF PRESENT ILLNESS:    63 male with HTN, DM II, PAD admitted with leg, Acute Limb  Ischemia   CTA A/P with b/l LE run-noff significant for right external iliac artery occlusion with no reconstitution of flow to the distal RLE. 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   blood cultures are pos for bacteremia   renal failure   no cp   no sob    PAST MEDICAL & SURGICAL HISTORY:  Hypertension      Diabetes              MEDICATIONS:  amLODIPine   Tablet 5 milliGRAM(s) Oral daily  heparin   Injectable 5000 Unit(s) IV Push every 6 hours PRN  heparin   Injectable 2500 Unit(s) IV Push every 6 hours PRN  heparin  Infusion.  Unit(s)/Hr IV Continuous <Continuous>    vancomycin  IVPB      vancomycin  IVPB 1000 milliGRAM(s) IV Intermittent every 24 hours      acetaminophen     Tablet .. 650 milliGRAM(s) Oral every 6 hours PRN      dextrose 50% Injectable 25 Gram(s) IV Push once  dextrose 50% Injectable 12.5 Gram(s) IV Push once  dextrose 50% Injectable 25 Gram(s) IV Push once  dextrose Oral Gel 15 Gram(s) Oral once PRN  glucagon  Injectable 1 milliGRAM(s) IntraMuscular once  insulin lispro (ADMELOG) corrective regimen sliding scale   SubCutaneous three times a day before meals  insulin lispro (ADMELOG) corrective regimen sliding scale   SubCutaneous at bedtime    chlorhexidine 2% Cloths 1 Application(s) Topical daily  dextrose 5%. 1000 milliLiter(s) IV Continuous <Continuous>  dextrose 5%. 1000 milliLiter(s) IV Continuous <Continuous>  influenza   Vaccine 0.5 milliLiter(s) IntraMuscular once  lactated ringers. 1000 milliLiter(s) IV Continuous <Continuous>      FAMILY HISTORY:  No pertinent family history in first degree relatives        Non-contributory    SOCIAL HISTORY:    No tobacco, drugs or etoh    Allergies    penicillin (Anaphylaxis)    Intolerances    	    REVIEW OF SYSTEMS:  as above  The rest of the 14 points ROS reviewed and except above they are unremarkable.        PHYSICAL EXAM:  T(C): 37.4 (11-18-23 @ 05:25), Max: 37.4 (11-18-23 @ 05:25)  HR: 86 (11-18-23 @ 05:25) (85 - 97)  BP: 136/82 (11-18-23 @ 05:25) (120/79 - 136/82)  RR: 18 (11-18-23 @ 05:25) (17 - 18)  SpO2: 95% (11-18-23 @ 05:25) (94% - 98%)  Wt(kg): --  I&O's Summary    17 Nov 2023 07:01  -  18 Nov 2023 07:00  --------------------------------------------------------  IN: 4443 mL / OUT: 50 mL / NET: 4393 mL        JVP: Normal  Neck: supple  Lung: clear   CV: S1 S2 , Murmur:  Abd: soft  Ext: No edema  neuro: Awake / alert  Psych: flat affect  Skin: foot ulcer     LABS/DATA:    TELEMETRY: 	    ECG:  	   	  CARDIAC MARKERS:                                      9.3    17.78 )-----------( 178      ( 18 Nov 2023 07:10 )             28.8     11-18    128<L>  |  97  |  41<H>  ----------------------------<  160<H>  5.0   |  18<L>  |  3.99<H>    Ca    8.1<L>      18 Nov 2023 07:10    TPro  5.2<L>  /  Alb  2.1<L>  /  TBili  0.5  /  DBili  x   /  AST  42<H>  /  ALT  25  /  AlkPhos  79  11-17    proBNP:   Lipid Profile:   HgA1c:   TSH:

## 2023-11-18 NOTE — PROGRESS NOTE ADULT - SUBJECTIVE AND OBJECTIVE BOX
Plastic Surgery Progress Note (pg [facility]: [id number], [facility]: [phone])    SUBJECTIVE  The patient was seen and examined.     OBJECTIVE  ___________________________________________________  VITAL SIGNS / I&O's   Vital Signs Last 24 Hrs  T(C): 37.2 (18 Nov 2023 09:57), Max: 37.4 (18 Nov 2023 05:25)  T(F): 98.9 (18 Nov 2023 09:57), Max: 99.3 (18 Nov 2023 05:25)  HR: 80 (18 Nov 2023 09:57) (80 - 97)  BP: 145/81 (18 Nov 2023 09:57) (126/75 - 145/81)  BP(mean): 87 (18 Nov 2023 01:10) (87 - 87)  RR: 18 (18 Nov 2023 09:57) (17 - 18)  SpO2: 95% (18 Nov 2023 09:57) (94% - 98%)    Parameters below as of 18 Nov 2023 09:57  Patient On (Oxygen Delivery Method): room air          17 Nov 2023 07:01  -  18 Nov 2023 07:00  --------------------------------------------------------  IN:    Heparin Infusion: 253 mL    Lactated Ringers: 3175 mL    Oral Fluid: 1015 mL  Total IN: 4443 mL    OUT:    Voided (mL): 50 mL  Total OUT: 50 mL    Total NET: 4393 mL      18 Nov 2023 07:01  -  18 Nov 2023 13:18  --------------------------------------------------------  IN:    Oral Fluid: 280 mL  Total IN: 280 mL    OUT:  Total OUT: 0 mL    Total NET: 280 mL        ___________________________________________________  PHYSICAL EXAM    General: No acute distress  Respiratory: Nonlabored  Cardiovascular: RRR  Abdominal: Soft, nondistended, nontender.   Extremities: right lower extremity: severe diffuse discoloration on whole right foot with blisters on first toe, and ball of foot;  Right foot partial thickness dry gangrene digits 1-5, dorsal medial midfoot to anterior medial ankle ischemic changes, no pus, no tracking or tunneling, no malodor, no bogginess or fluctuance.   right DP/PT/distal AT pulses nonpalpable/non dopplerable; dopplerable right popliteal signal and proximal AT signal; palpable right femoral arterial pulse  Left lower extremity: palpable femoral arterial, popliteal, AT/DP/PT pulses palpable     ___________________________________________________  LABS                        9.3    17.78 )-----------( 178      ( 18 Nov 2023 07:10 )             28.8     18 Nov 2023 07:10    128    |  97     |  41     ----------------------------<  160    5.0     |  18     |  3.99     Ca    8.1        18 Nov 2023 07:10    TPro  5.2    /  Alb  2.1    /  TBili  0.5    /  DBili  x      /  AST  42     /  ALT  25     /  AlkPhos  79     17 Nov 2023 06:49    PTT - ( 18 Nov 2023 07:10 )  PTT:124.2 sec  CAPILLARY BLOOD GLUCOSE      POCT Blood Glucose.: 229 mg/dL (18 Nov 2023 11:34)  POCT Blood Glucose.: 235 mg/dL (18 Nov 2023 09:23)  POCT Blood Glucose.: 215 mg/dL (17 Nov 2023 21:50)  POCT Blood Glucose.: 288 mg/dL (17 Nov 2023 18:29)  POCT Blood Glucose.: 128 mg/dL (17 Nov 2023 15:44)        Urinalysis Basic - ( 18 Nov 2023 07:10 )    Color: x / Appearance: x / SG: x / pH: x  Gluc: 160 mg/dL / Ketone: x  / Bili: x / Urobili: x   Blood: x / Protein: x / Nitrite: x   Leuk Esterase: x / RBC: x / WBC x   Sq Epi: x / Non Sq Epi: x / Bacteria: x      ___________________________________________________  MICRO  Recent Cultures:  Specimen Source: .Blood Blood-Peripheral, 11-16 @ 19:34; Results   Growth in anaerobic bottle: Gram Positive Cocci in Clusters  Growth in aerobic bottle: Gram Positive Cocci in Clusters<!>; Gram Stain:   Growth in anaerobic bottle: Gram Positive Cocci in Clusters  Growth in aerobic bottle: Gram Positive Cocci in Clusters<!>; Organism: --  Specimen Source: .Blood Blood-[facility], 11-16 @ 19:33; Results   Growth in aerobic bottle: Gram Positive Cocci in Clusters  Growth in anaerobic bottle: Gram Positive Cocci in Clusters<!>; Gram Stain:   Growth in aerobic bottle: Gram Positive Cocci in Clusters  Growth in anaerobic bottle: Gram Positive Cocci in Clusters<!>; Organism: --  Specimen Source: .Blood Blood-Peripheral, 11-15 @ 12:50; Results   Growth in aerobic and anaerobic bottles: Staphylococcus epidermidis  Susceptibility to follow.<!>; Gram Stain:   Growth in anaerobic bottle: Gram Positive Cocci in Clusters  Growth in aerobic bottle: Gram Positive Cocci in Clusters<!>; Organism: --  Specimen Source: .Blood Blood-Peripheral, 11-15 @ 12:35; Results   Growth in aerobic and anaerobic bottles: Staphylococcus epidermidis  Direct identification is available within approximately 3-5  hours either by Blood Panel Multiplexed PCR or Direct  MALDI-TOF. Details: [email or web address]<!>; Gram Stain:   Growth in aerobic bottle: Gram Positive Cocci in Clusters  Growth in anaerobic bottle: Gram Positive Cocci in Clusters<!>; Organism: Blood Culture PCR<!>    ___________________________________________________  MEDICATIONS  (STANDING):  amLODIPine   Tablet 5 milliGRAM(s) Oral daily  chlorhexidine 2% Cloths 1 Application(s) Topical daily  dextrose 5%. 1000 milliLiter(s) (50 mL/Hr) IV Continuous <Continuous>  dextrose 5%. 1000 milliLiter(s) (100 mL/Hr) IV Continuous <Continuous>  dextrose 50% Injectable 25 Gram(s) IV Push once  dextrose 50% Injectable 12.5 Gram(s) IV Push once  dextrose 50% Injectable 25 Gram(s) IV Push once  glucagon  Injectable 1 milliGRAM(s) IntraMuscular once  heparin  Infusion.  Unit(s)/Hr (11 mL/Hr) IV Continuous <Continuous>  influenza   Vaccine 0.5 milliLiter(s) IntraMuscular once  insulin lispro (ADMELOG) corrective regimen sliding scale   SubCutaneous three times a day before meals  insulin lispro (ADMELOG) corrective regimen sliding scale   SubCutaneous at bedtime  sodium chloride 0.45% 1000 milliLiter(s) (75 mL/Hr) IV Continuous <Continuous>  vancomycin  IVPB      vancomycin  IVPB 1000 milliGRAM(s) IV Intermittent every 24 hours    MEDICATIONS  (PRN):  acetaminophen     Tablet .. 650 milliGRAM(s) Oral every 6 hours PRN Temp greater or equal to 38C (100.4F)  dextrose Oral Gel 15 Gram(s) Oral once PRN Blood Glucose LESS THAN 70 milliGRAM(s)/deciliter  heparin   Injectable 5000 Unit(s) IV Push every 6 hours PRN For aPTT less than 40  heparin   Injectable 2500 Unit(s) IV Push every 6 hours PRN For aPTT between 40 - 57  oxyCODONE    IR 5 milliGRAM(s) Oral every 6 hours PRN Severe Pain (7 - 10)

## 2023-11-18 NOTE — PRE PROCEDURE NOTE - PRE PROCEDURE EVALUATION
Preop Dx: Limb Ischemia  Surgeon: Willem  Procedure: Isacc ESPARZA    Vital Signs Last 24 Hrs  T(C): 37.2 (19 Nov 2023 00:20), Max: 37.4 (18 Nov 2023 05:25)  T(F): 99 (19 Nov 2023 00:20), Max: 99.3 (18 Nov 2023 05:25)  HR: 89 (19 Nov 2023 00:20) (80 - 93)  BP: 135/83 (19 Nov 2023 00:20) (135/83 - 158/88)  BP(mean): --  RR: 18 (19 Nov 2023 00:20) (18 - 18)  SpO2: 94% (19 Nov 2023 00:20) (94% - 97%)    Parameters below as of 19 Nov 2023 00:20  Patient On (Oxygen Delivery Method): room air                            9.3    17.78 )-----------( 178      ( 18 Nov 2023 07:10 )             28.8     11-18    128<L>  |  97  |  41<H>  ----------------------------<  160<H>  5.0   |  18<L>  |  3.99<H>    Ca    8.1<L>      18 Nov 2023 07:10    TPro  5.2<L>  /  Alb  2.1<L>  /  TBili  0.5  /  DBili  x   /  AST  42<H>  /  ALT  25  /  AlkPhos  79  11-17    PTT - ( 18 Nov 2023 22:27 )  PTT:61.2 sec  Daily     Daily     EKG:  CXR:   Type and Screen:         A/P: 63y Male     - OR 11/19/23 for BKA with Dr. Bangura  - NPO past midnight, except medications  - IVF while NPO  - Consent signed and in chart  - Heparin held at 1:30 AM

## 2023-11-18 NOTE — PROGRESS NOTE ADULT - ASSESSMENT
63 M pmhx htn, dm, hld presents to ED from rehab for acute limb ischemia.    Acute Limb  Ischemia   CTA A/P with b/l LE run-noff significant for right external iliac artery occlusion with no reconstitution of flow to the distal RLE. 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     Vascular Podiatry consulted   iv Heparin   Wound Care consult appreciated   ID consult appreciated   Patient agreeing for Amputation   Moniotr off abx   CONS likely contaminant     Cards eval appreciated   Pending Echo     Patient high risk for surgery       OLIVER Contrats induced    IVF to 0.45NS + 75mEq NaHCO3 and reduce rate to 75cc/hr     DM HISS   A1C     HTN Home meds

## 2023-11-18 NOTE — PROGRESS NOTE ADULT - SUBJECTIVE AND OBJECTIVE BOX
NEPHROLOGY     Patient seen and examined sitting on bed, reports feeling ok, no new complaints, reports voiding without issues, in no acute distress.     MEDICATIONS  (STANDING):  amLODIPine   Tablet 5 milliGRAM(s) Oral daily  chlorhexidine 2% Cloths 1 Application(s) Topical daily  dextrose 5%. 1000 milliLiter(s) (50 mL/Hr) IV Continuous <Continuous>  dextrose 5%. 1000 milliLiter(s) (100 mL/Hr) IV Continuous <Continuous>  dextrose 50% Injectable 25 Gram(s) IV Push once  dextrose 50% Injectable 12.5 Gram(s) IV Push once  dextrose 50% Injectable 25 Gram(s) IV Push once  glucagon  Injectable 1 milliGRAM(s) IntraMuscular once  heparin  Infusion.  Unit(s)/Hr (11 mL/Hr) IV Continuous <Continuous>  influenza   Vaccine 0.5 milliLiter(s) IntraMuscular once  insulin lispro (ADMELOG) corrective regimen sliding scale   SubCutaneous three times a day before meals  insulin lispro (ADMELOG) corrective regimen sliding scale   SubCutaneous at bedtime  lactated ringers. 1000 milliLiter(s) (150 mL/Hr) IV Continuous <Continuous>  vancomycin  IVPB      vancomycin  IVPB 1000 milliGRAM(s) IV Intermittent every 24 hours    VITALS:  T(C): , Max: 37.4 (11-18-23 @ 05:25)  T(F): , Max: 99.3 (11-18-23 @ 05:25)  HR: 86 (11-18-23 @ 05:25)  BP: 136/82 (11-18-23 @ 05:25)  BP(mean): 87 (11-18-23 @ 01:10)  RR: 18 (11-18-23 @ 05:25)  SpO2: 95% (11-18-23 @ 05:25)    I and O's:    11-17 @ 07:01  -  11-18 @ 07:00  --------------------------------------------------------  IN: 4443 mL / OUT: 50 mL / NET: 4393 mL    PHYSICAL EXAM:  Constitutional: NAD; cachetic   Neck:  No JVD  Respiratory: reduce   Cardiovascular: S1 and S2  Gastrointestinal: BS+, soft, NT/ND  Extremities: No peripheral edema, rle with dsg   Neurological: A/O x 3, no focal deficits  Psychiatric: Normal mood, normal affect  : No Pacheco  Skin: No rashes    LABS:                        9.3    17.78 )-----------( 178      ( 18 Nov 2023 07:10 )             28.8     11-18    128<L>  |  97  |  41<H>  ----------------------------<  160<H>  5.0   |  18<L>  |  3.99<H>    Ca    8.1<L>      18 Nov 2023 07:10    TPro  5.2<L>  /  Alb  2.1<L>  /  TBili  0.5  /  DBili  x   /  AST  42<H>  /  ALT  25  /  AlkPhos  79  11-17    Urine Studies  Creatinine, Random Urine: 47 mg/dL (11-17 @ 04:22)  Protein/Creatinine Ratio Calculation: 0.3 Ratio (11-17 @ 04:22)    ASSESSMENT/PLAN:   62 y/o male with pmhx htn, dm, hld  presents to the ED sent in from rehab center for right foot discoloration and pain  Hyponatremia   Metabolic acidosis   Wedge-shaped hypoattenuating focus of the left kidney concerning for renal infarct or focus of  infection/pyelonephritis.  Distended bladder without hydro  OLIVER following contrast   High grade bacteremia     1 Renal - Likely BHAKTI at present. Supportive care   Check bladder scan   Eventual renal dopplers to assess possible infarct vs infection   2 Vasc-Amputation per vasc; Needs source control   3 ID -Infectious disease is following and awaiting sensitivities, on vanco   NEPHROLOGY     Patient seen and examined sitting on bed, reports feeling ok, no new complaints, reports voiding without issues, in no acute distress.     MEDICATIONS  (STANDING):  amLODIPine   Tablet 5 milliGRAM(s) Oral daily  chlorhexidine 2% Cloths 1 Application(s) Topical daily  dextrose 5%. 1000 milliLiter(s) (50 mL/Hr) IV Continuous <Continuous>  dextrose 5%. 1000 milliLiter(s) (100 mL/Hr) IV Continuous <Continuous>  dextrose 50% Injectable 25 Gram(s) IV Push once  dextrose 50% Injectable 12.5 Gram(s) IV Push once  dextrose 50% Injectable 25 Gram(s) IV Push once  glucagon  Injectable 1 milliGRAM(s) IntraMuscular once  heparin  Infusion.  Unit(s)/Hr (11 mL/Hr) IV Continuous <Continuous>  influenza   Vaccine 0.5 milliLiter(s) IntraMuscular once  insulin lispro (ADMELOG) corrective regimen sliding scale   SubCutaneous three times a day before meals  insulin lispro (ADMELOG) corrective regimen sliding scale   SubCutaneous at bedtime  lactated ringers. 1000 milliLiter(s) (150 mL/Hr) IV Continuous <Continuous>  vancomycin  IVPB      vancomycin  IVPB 1000 milliGRAM(s) IV Intermittent every 24 hours    VITALS:  T(C): , Max: 37.4 (11-18-23 @ 05:25)  T(F): , Max: 99.3 (11-18-23 @ 05:25)  HR: 86 (11-18-23 @ 05:25)  BP: 136/82 (11-18-23 @ 05:25)  BP(mean): 87 (11-18-23 @ 01:10)  RR: 18 (11-18-23 @ 05:25)  SpO2: 95% (11-18-23 @ 05:25)    I and O's:    11-17 @ 07:01  -  11-18 @ 07:00  --------------------------------------------------------  IN: 4443 mL / OUT: 50 mL / NET: 4393 mL    PHYSICAL EXAM:  Constitutional: NAD; cachetic   Neck:  No JVD  Respiratory: reduce   Cardiovascular: S1 and S2  Gastrointestinal: BS+, soft, NT/ND  Extremities: No peripheral edema, rle with dsg   Neurological: A/O x 3, no focal deficits  Psychiatric: Normal mood, normal affect  : No Pacheco  Skin: No rashes    LABS:                        9.3    17.78 )-----------( 178      ( 18 Nov 2023 07:10 )             28.8     11-18    128<L>  |  97  |  41<H>  ----------------------------<  160<H>  5.0   |  18<L>  |  3.99<H>    Ca    8.1<L>      18 Nov 2023 07:10    TPro  5.2<L>  /  Alb  2.1<L>  /  TBili  0.5  /  DBili  x   /  AST  42<H>  /  ALT  25  /  AlkPhos  79  11-17    Urine Studies  Creatinine, Random Urine: 47 mg/dL (11-17 @ 04:22)  Protein/Creatinine Ratio Calculation: 0.3 Ratio (11-17 @ 04:22)    ASSESSMENT/PLAN:   64 y/o male with pmhx htn, dm, hld  presents to the ED sent in from rehab center for right foot discoloration and pain  Hyponatremia   Metabolic acidosis   Wedge-shaped hypoattenuating focus of the left kidney concerning for renal infarct or focus of  infection/pyelonephritis.  Distended bladder without hydro  OLIVER following contrast   High grade bacteremia     1 Renal - Likely BHAKTI at present. Supportive care   Check bladder scan   Maintain low potassium diet  Would change IVF to 0.45NS + 75mEq NaHCO3 and reduce rate to 75cc/hr   Eventual renal dopplers to assess possible infarct vs infection   2 Vasc-Amputation per vasc; Needs source control   3 ID -Infectious disease is following and awaiting sensitivities, on vanco   Check vanco trough    D/w Dr. Sri Haile, [facility]  [phone]  NEPHROLOGY     Patient seen and examined sitting on bed, reports feeling ok, no new complaints, reports voiding without issues, in no acute distress.     MEDICATIONS  (STANDING):  amLODIPine   Tablet 5 milliGRAM(s) Oral daily  chlorhexidine 2% Cloths 1 Application(s) Topical daily  dextrose 5%. 1000 milliLiter(s) (50 mL/Hr) IV Continuous <Continuous>  dextrose 5%. 1000 milliLiter(s) (100 mL/Hr) IV Continuous <Continuous>  dextrose 50% Injectable 25 Gram(s) IV Push once  dextrose 50% Injectable 12.5 Gram(s) IV Push once  dextrose 50% Injectable 25 Gram(s) IV Push once  glucagon  Injectable 1 milliGRAM(s) IntraMuscular once  heparin  Infusion.  Unit(s)/Hr (11 mL/Hr) IV Continuous <Continuous>  influenza   Vaccine 0.5 milliLiter(s) IntraMuscular once  insulin lispro (ADMELOG) corrective regimen sliding scale   SubCutaneous three times a day before meals  insulin lispro (ADMELOG) corrective regimen sliding scale   SubCutaneous at bedtime  lactated ringers. 1000 milliLiter(s) (150 mL/Hr) IV Continuous <Continuous>  vancomycin  IVPB      vancomycin  IVPB 1000 milliGRAM(s) IV Intermittent every 24 hours    VITALS:  T(C): , Max: 37.4 (11-18-23 @ 05:25)  T(F): , Max: 99.3 (11-18-23 @ 05:25)  HR: 86 (11-18-23 @ 05:25)  BP: 136/82 (11-18-23 @ 05:25)  BP(mean): 87 (11-18-23 @ 01:10)  RR: 18 (11-18-23 @ 05:25)  SpO2: 95% (11-18-23 @ 05:25)    I and O's:    11-17 @ 07:01  -  11-18 @ 07:00  --------------------------------------------------------  IN: 4443 mL / OUT: 50 mL / NET: 4393 mL    PHYSICAL EXAM:  Constitutional: NAD; cachetic   Neck:  No JVD  Respiratory: reduce   Cardiovascular: S1 and S2  Gastrointestinal: BS+, soft, NT/ND  Extremities: No peripheral edema, rle with dsg   Neurological: A/O x 3, no focal deficits  Psychiatric: Normal mood, normal affect  : No Pacheco  Skin: No rashes    LABS:                        9.3    17.78 )-----------( 178      ( 18 Nov 2023 07:10 )             28.8     11-18    128<L>  |  97  |  41<H>  ----------------------------<  160<H>  5.0   |  18<L>  |  3.99<H>    Ca    8.1<L>      18 Nov 2023 07:10    TPro  5.2<L>  /  Alb  2.1<L>  /  TBili  0.5  /  DBili  x   /  AST  42<H>  /  ALT  25  /  AlkPhos  79  11-17    Urine Studies  Creatinine, Random Urine: 47 mg/dL (11-17 @ 04:22)  Protein/Creatinine Ratio Calculation: 0.3 Ratio (11-17 @ 04:22)    ASSESSMENT/PLAN:   64 y/o male with pmhx htn, dm, hld  presents to the ED sent in from rehab center for right foot discoloration and pain  Hyponatremia   Metabolic acidosis   Wedge-shaped hypoattenuating focus of the left kidney concerning for renal infarct or focus of  infection/pyelonephritis.  Distended bladder without hydro  OLIVER following contrast   High grade bacteremia     1 Renal - Likely BHAKTI at present. Supportive care   Check bladder scan   Maintain low potassium diet  Would change IVF to 0.45NS + 75mEq NaHCO3 and reduce rate to 75cc/hr   Eventual renal dopplers to assess possible infarct vs infection   2 Vasc-Amputation per vasc; Needs source control   3 ID -Infectious disease is following and awaiting sensitivities, on vanco   Check vanco trough    D/w Dr. Fierro and ACP     Molly Haile, [facility]  [phone]

## 2023-11-18 NOTE — CONSULT NOTE ADULT - ASSESSMENT
Limb ischemia  bacteremia Gram positive   sepsis   OLIVER    empiric abx  has renal infarct  suspicion for IE  obtain echo , if neg then will need MARINO  check EKG   fu with ID and renal   on heparin for PAD  fu with vascular surgery      Advanced care planning was discussed with patient and family.  Risks, benefits and alternatives of the cardiac treatments and medical therapy including procedures were discussed in detail and all questions were answered. Importance of compliance with medical therapy and lifestyle modification to improve cardiovascular health were addressed. Appropriate forms and patient educational materials were reviewed. 30 minutes face to face spent.

## 2023-11-18 NOTE — PROGRESS NOTE ADULT - ASSESSMENT
63M PMHx HTN, DM. HLD, presents to ED from rehab for acute limb ischemia. Patient was discharged about 1 week ago from OSH to rehab for conservative medical treatment of foot infection and now brought to ED for increased pain and acute discoloration of foot. Does not weightbare on right leg and has not ambulated in months.     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   VBG notable for elevated lactated of 2.1    Vascular consulted for evaluation of acute limb ischemia       Plan:  - Patient now amenable to BKA. Plan for OR pending medical and cardiac optimization/risk stratification/clearance  - recommend continuing hep gtt  - f/u podiatry local wound care recommendations  - vascular surgery to follow, page with any questions/concerns      Vascular surgery   [id number]  63M PMHx HTN, DM. HLD, presents to ED from rehab for acute limb ischemia. Patient was discharged about 1 week ago from OSH to rehab for conservative medical treatment of foot infection and now brought to ED for increased pain and acute discoloration of foot. Does not weightbare on right leg and has not ambulated in months.     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   VBG notable for elevated lactated of 2.1    Vascular consulted for evaluation of acute limb ischemia       Plan:  - Patient now amenable to BKA. Plan for OR 11/19. Would appreciate medical and cardiac optimization/risk stratification/clearance  - recommend continuing hep gtt until 6 hours pre-op  - f/u podiatry local wound care recommendations  - vascular surgery to follow, page with any questions/concerns      Vascular surgery   [id number]

## 2023-11-18 NOTE — PROGRESS NOTE ADULT - SUBJECTIVE AND OBJECTIVE BOX
Patient is a 63y old  Male who presents with a chief complaint of Rt foot pain (16 Nov 2023 16:31)      SUBJECTIVE / OVERNIGHT EVENTS: no events     Patient for BKA   Patient needs Echo   High risk for surgery       T(C): 36.9 (11-18-23 @ 17:02), Max: 37.2 (11-18-23 @ 09:57)  HR: 93 (11-18-23 @ 17:02) (80 - 93)  BP: 156/85 (11-18-23 @ 17:02) (145/81 - 158/88)  RR: 18 (11-18-23 @ 17:02) (18 - 18)  SpO2: 97% (11-18-23 @ 17:02) (95% - 97%)      MEDICATIONS  (STANDING):  amLODIPine   Tablet 5 milliGRAM(s) Oral daily  chlorhexidine 2% Cloths 1 Application(s) Topical daily  dextrose 5%. 1000 milliLiter(s) (50 mL/Hr) IV Continuous <Continuous>  dextrose 5%. 1000 milliLiter(s) (100 mL/Hr) IV Continuous <Continuous>  dextrose 50% Injectable 25 Gram(s) IV Push once  dextrose 50% Injectable 12.5 Gram(s) IV Push once  dextrose 50% Injectable 25 Gram(s) IV Push once  glucagon  Injectable 1 milliGRAM(s) IntraMuscular once  heparin  Infusion.  Unit(s)/Hr (11 mL/Hr) IV Continuous <Continuous>  influenza   Vaccine 0.5 milliLiter(s) IntraMuscular once  insulin lispro (ADMELOG) corrective regimen sliding scale   SubCutaneous three times a day before meals  insulin lispro (ADMELOG) corrective regimen sliding scale   SubCutaneous at bedtime  sodium chloride 0.45% 1000 milliLiter(s) (75 mL/Hr) IV Continuous <Continuous>  vancomycin  IVPB      vancomycin  IVPB 1000 milliGRAM(s) IV Intermittent every 24 hours    MEDICATIONS  (PRN):  acetaminophen     Tablet .. 650 milliGRAM(s) Oral every 6 hours PRN Temp greater or equal to 38C (100.4F)  dextrose Oral Gel 15 Gram(s) Oral once PRN Blood Glucose LESS THAN 70 milliGRAM(s)/deciliter  heparin   Injectable 5000 Unit(s) IV Push every 6 hours PRN For aPTT less than 40  heparin   Injectable 2500 Unit(s) IV Push every 6 hours PRN For aPTT between 40 - 57  oxyCODONE    IR 5 milliGRAM(s) Oral every 6 hours PRN Severe Pain (7 - 10)    PHYSICAL EXAM:  GENERAL: NAD, well-developed  HEAD:  Atraumatic, Normocephalic  EYES: EOMI, PERRLA, conjunctiva and sclera clear  NECK: Supple, No JVD  CHEST/LUNG: Clear to auscultation bilaterally; No wheeze  HEART: Regular rate and rhythm; No murmurs, rubs, or gallops  ABDOMEN: Soft, Nontender, Nondistended; Bowel sounds present  EXTREMITIES:  2+ Peripheral Pulses, No clubbing, cyanosis, or edema  PSYCH: AAOx3  NEUROLOGY: non-focal  SKIN: No rashes or lesions    LABS:                        10.2   13.91 )-----------( 140      ( 16 Nov 2023 07:43 )             32.4     11-16    132<L>  |  102  |  27<H>  ----------------------------<  304<H>  4.5   |  19<L>  |  1.92<H>    Ca    8.1<L>      16 Nov 2023 07:43    TPro  5.4<L>  /  Alb  2.5<L>  /  TBili  0.5  /  DBili  x   /  AST  30  /  ALT  20  /  AlkPhos  68  11-15    PT/INR - ( 15 Nov 2023 23:35 )   PT: 12.7 sec;   INR: 1.16 ratio         PTT - ( 16 Nov 2023 14:57 )  PTT:68.7 sec  CARDIAC MARKERS ( 15 Nov 2023 18:35 )  x     / x     / 371 U/L / x     / x          Urinalysis Basic - ( 16 Nov 2023 07:43 )    Color: x / Appearance: x / SG: x / pH: x  Gluc: 304 mg/dL / Ketone: x  / Bili: x / Urobili: x   Blood: x / Protein: x / Nitrite: x   Leuk Esterase: x / RBC: x / WBC x   Sq Epi: x / Non Sq Epi: x / Bacteria: x        RADIOLOGY & ADDITIONAL TESTS:    Imaging Personally Reviewed:    Consultant(s) Notes Reviewed:      Care Discussed with Consultants/Other Providers:

## 2023-11-19 LAB
ANION GAP SERPL CALC-SCNC: 13 MMOL/L — SIGNIFICANT CHANGE UP (ref 5–17)
ANION GAP SERPL CALC-SCNC: 13 MMOL/L — SIGNIFICANT CHANGE UP (ref 5–17)
APTT BLD: 28.1 SEC — SIGNIFICANT CHANGE UP (ref 24.5–35.6)
APTT BLD: 28.1 SEC — SIGNIFICANT CHANGE UP (ref 24.5–35.6)
BLD GP AB SCN SERPL QL: NEGATIVE — SIGNIFICANT CHANGE UP
BLD GP AB SCN SERPL QL: NEGATIVE — SIGNIFICANT CHANGE UP
BUN SERPL-MCNC: 43 MG/DL — HIGH (ref 7–23)
BUN SERPL-MCNC: 43 MG/DL — HIGH (ref 7–23)
CALCIUM SERPL-MCNC: 7.9 MG/DL — LOW (ref 8.4–10.5)
CALCIUM SERPL-MCNC: 7.9 MG/DL — LOW (ref 8.4–10.5)
CHLORIDE SERPL-SCNC: 98 MMOL/L — SIGNIFICANT CHANGE UP (ref 96–108)
CHLORIDE SERPL-SCNC: 98 MMOL/L — SIGNIFICANT CHANGE UP (ref 96–108)
CO2 SERPL-SCNC: 18 MMOL/L — LOW (ref 22–31)
CO2 SERPL-SCNC: 18 MMOL/L — LOW (ref 22–31)
CREAT SERPL-MCNC: 4.16 MG/DL — HIGH (ref 0.5–1.3)
CREAT SERPL-MCNC: 4.16 MG/DL — HIGH (ref 0.5–1.3)
EGFR: 15 ML/MIN/1.73M2 — LOW
EGFR: 15 ML/MIN/1.73M2 — LOW
GLUCOSE BLDC GLUCOMTR-MCNC: 163 MG/DL — HIGH (ref 70–99)
GLUCOSE BLDC GLUCOMTR-MCNC: 163 MG/DL — HIGH (ref 70–99)
GLUCOSE BLDC GLUCOMTR-MCNC: 165 MG/DL — HIGH (ref 70–99)
GLUCOSE BLDC GLUCOMTR-MCNC: 165 MG/DL — HIGH (ref 70–99)
GLUCOSE BLDC GLUCOMTR-MCNC: 182 MG/DL — HIGH (ref 70–99)
GLUCOSE BLDC GLUCOMTR-MCNC: 182 MG/DL — HIGH (ref 70–99)
GLUCOSE BLDC GLUCOMTR-MCNC: 188 MG/DL — HIGH (ref 70–99)
GLUCOSE BLDC GLUCOMTR-MCNC: 188 MG/DL — HIGH (ref 70–99)
GLUCOSE BLDC GLUCOMTR-MCNC: 191 MG/DL — HIGH (ref 70–99)
GLUCOSE BLDC GLUCOMTR-MCNC: 191 MG/DL — HIGH (ref 70–99)
GLUCOSE BLDC GLUCOMTR-MCNC: 203 MG/DL — HIGH (ref 70–99)
GLUCOSE BLDC GLUCOMTR-MCNC: 203 MG/DL — HIGH (ref 70–99)
GLUCOSE SERPL-MCNC: 153 MG/DL — HIGH (ref 70–99)
GLUCOSE SERPL-MCNC: 153 MG/DL — HIGH (ref 70–99)
HCT VFR BLD CALC: 28.2 % — LOW (ref 39–50)
HCT VFR BLD CALC: 28.2 % — LOW (ref 39–50)
HGB BLD-MCNC: 9.2 G/DL — LOW (ref 13–17)
HGB BLD-MCNC: 9.2 G/DL — LOW (ref 13–17)
MAGNESIUM SERPL-MCNC: 1.7 MG/DL — SIGNIFICANT CHANGE UP (ref 1.6–2.6)
MAGNESIUM SERPL-MCNC: 1.7 MG/DL — SIGNIFICANT CHANGE UP (ref 1.6–2.6)
MCHC RBC-ENTMCNC: 24.6 PG — LOW (ref 27–34)
MCHC RBC-ENTMCNC: 24.6 PG — LOW (ref 27–34)
MCHC RBC-ENTMCNC: 32.6 GM/DL — SIGNIFICANT CHANGE UP (ref 32–36)
MCHC RBC-ENTMCNC: 32.6 GM/DL — SIGNIFICANT CHANGE UP (ref 32–36)
MCV RBC AUTO: 75.4 FL — LOW (ref 80–100)
MCV RBC AUTO: 75.4 FL — LOW (ref 80–100)
NRBC # BLD: 0 /100 WBCS — SIGNIFICANT CHANGE UP (ref 0–0)
NRBC # BLD: 0 /100 WBCS — SIGNIFICANT CHANGE UP (ref 0–0)
PHOSPHATE SERPL-MCNC: 4.2 MG/DL — SIGNIFICANT CHANGE UP (ref 2.5–4.5)
PHOSPHATE SERPL-MCNC: 4.2 MG/DL — SIGNIFICANT CHANGE UP (ref 2.5–4.5)
PLATELET # BLD AUTO: 234 K/UL — SIGNIFICANT CHANGE UP (ref 150–400)
PLATELET # BLD AUTO: 234 K/UL — SIGNIFICANT CHANGE UP (ref 150–400)
POTASSIUM SERPL-MCNC: 5 MMOL/L — SIGNIFICANT CHANGE UP (ref 3.5–5.3)
POTASSIUM SERPL-MCNC: 5 MMOL/L — SIGNIFICANT CHANGE UP (ref 3.5–5.3)
POTASSIUM SERPL-SCNC: 5 MMOL/L — SIGNIFICANT CHANGE UP (ref 3.5–5.3)
POTASSIUM SERPL-SCNC: 5 MMOL/L — SIGNIFICANT CHANGE UP (ref 3.5–5.3)
RBC # BLD: 3.74 M/UL — LOW (ref 4.2–5.8)
RBC # BLD: 3.74 M/UL — LOW (ref 4.2–5.8)
RBC # FLD: 16.5 % — HIGH (ref 10.3–14.5)
RBC # FLD: 16.5 % — HIGH (ref 10.3–14.5)
RH IG SCN BLD-IMP: POSITIVE — SIGNIFICANT CHANGE UP
RH IG SCN BLD-IMP: POSITIVE — SIGNIFICANT CHANGE UP
SODIUM SERPL-SCNC: 129 MMOL/L — LOW (ref 135–145)
SODIUM SERPL-SCNC: 129 MMOL/L — LOW (ref 135–145)
WBC # BLD: 18.72 K/UL — HIGH (ref 3.8–10.5)
WBC # BLD: 18.72 K/UL — HIGH (ref 3.8–10.5)
WBC # FLD AUTO: 18.72 K/UL — HIGH (ref 3.8–10.5)
WBC # FLD AUTO: 18.72 K/UL — HIGH (ref 3.8–10.5)

## 2023-11-19 PROCEDURE — 88311 DECALCIFY TISSUE: CPT | Mod: 26

## 2023-11-19 PROCEDURE — 88307 TISSUE EXAM BY PATHOLOGIST: CPT | Mod: 26

## 2023-11-19 RX ORDER — ONDANSETRON 8 MG/1
4 TABLET, FILM COATED ORAL ONCE
Refills: 0 | Status: DISCONTINUED | OUTPATIENT
Start: 2023-11-19 | End: 2023-11-19

## 2023-11-19 RX ORDER — DEXTROSE 50 % IN WATER 50 %
25 SYRINGE (ML) INTRAVENOUS ONCE
Refills: 0 | Status: DISCONTINUED | OUTPATIENT
Start: 2023-11-19 | End: 2023-11-30

## 2023-11-19 RX ORDER — HYDROMORPHONE HYDROCHLORIDE 2 MG/ML
0.5 INJECTION INTRAMUSCULAR; INTRAVENOUS; SUBCUTANEOUS
Refills: 0 | Status: DISCONTINUED | OUTPATIENT
Start: 2023-11-19 | End: 2023-11-19

## 2023-11-19 RX ORDER — GLUCAGON INJECTION, SOLUTION 0.5 MG/.1ML
1 INJECTION, SOLUTION SUBCUTANEOUS ONCE
Refills: 0 | Status: DISCONTINUED | OUTPATIENT
Start: 2023-11-19 | End: 2023-11-30

## 2023-11-19 RX ORDER — NALOXONE HYDROCHLORIDE 4 MG/.1ML
0.1 SPRAY NASAL
Refills: 0 | Status: DISCONTINUED | OUTPATIENT
Start: 2023-11-19 | End: 2023-11-22

## 2023-11-19 RX ORDER — CHLORHEXIDINE GLUCONATE 213 G/1000ML
1 SOLUTION TOPICAL DAILY
Refills: 0 | Status: DISCONTINUED | OUTPATIENT
Start: 2023-11-19 | End: 2023-11-30

## 2023-11-19 RX ORDER — MAGNESIUM SULFATE 500 MG/ML
2 VIAL (ML) INJECTION ONCE
Refills: 0 | Status: COMPLETED | OUTPATIENT
Start: 2023-11-19 | End: 2023-11-19

## 2023-11-19 RX ORDER — ACETAMINOPHEN 500 MG
650 TABLET ORAL EVERY 6 HOURS
Refills: 0 | Status: DISCONTINUED | OUTPATIENT
Start: 2023-11-19 | End: 2023-11-30

## 2023-11-19 RX ORDER — AMLODIPINE BESYLATE 2.5 MG/1
5 TABLET ORAL DAILY
Refills: 0 | Status: DISCONTINUED | OUTPATIENT
Start: 2023-11-19 | End: 2023-11-30

## 2023-11-19 RX ORDER — HYDROMORPHONE HYDROCHLORIDE 2 MG/ML
30 INJECTION INTRAMUSCULAR; INTRAVENOUS; SUBCUTANEOUS
Refills: 0 | Status: DISCONTINUED | OUTPATIENT
Start: 2023-11-19 | End: 2023-11-22

## 2023-11-19 RX ORDER — SODIUM CHLORIDE 9 MG/ML
1000 INJECTION, SOLUTION INTRAVENOUS
Refills: 0 | Status: DISCONTINUED | OUTPATIENT
Start: 2023-11-19 | End: 2023-11-30

## 2023-11-19 RX ORDER — VANCOMYCIN HCL 1 G
1000 VIAL (EA) INTRAVENOUS EVERY 24 HOURS
Refills: 0 | Status: DISCONTINUED | OUTPATIENT
Start: 2023-11-19 | End: 2023-11-20

## 2023-11-19 RX ORDER — ONDANSETRON 8 MG/1
4 TABLET, FILM COATED ORAL ONCE
Refills: 0 | Status: DISCONTINUED | OUTPATIENT
Start: 2023-11-19 | End: 2023-11-30

## 2023-11-19 RX ORDER — NALBUPHINE HYDROCHLORIDE 10 MG/ML
2.5 INJECTION, SOLUTION INTRAMUSCULAR; INTRAVENOUS; SUBCUTANEOUS EVERY 6 HOURS
Refills: 0 | Status: DISCONTINUED | OUTPATIENT
Start: 2023-11-19 | End: 2023-11-22

## 2023-11-19 RX ORDER — DEXTROSE 50 % IN WATER 50 %
12.5 SYRINGE (ML) INTRAVENOUS ONCE
Refills: 0 | Status: DISCONTINUED | OUTPATIENT
Start: 2023-11-19 | End: 2023-11-30

## 2023-11-19 RX ORDER — ONDANSETRON 8 MG/1
4 TABLET, FILM COATED ORAL EVERY 6 HOURS
Refills: 0 | Status: DISCONTINUED | OUTPATIENT
Start: 2023-11-19 | End: 2023-11-22

## 2023-11-19 RX ORDER — DEXTROSE 50 % IN WATER 50 %
15 SYRINGE (ML) INTRAVENOUS ONCE
Refills: 0 | Status: DISCONTINUED | OUTPATIENT
Start: 2023-11-19 | End: 2023-11-30

## 2023-11-19 RX ORDER — SODIUM CHLORIDE 9 MG/ML
1000 INJECTION, SOLUTION INTRAVENOUS
Refills: 0 | Status: DISCONTINUED | OUTPATIENT
Start: 2023-11-19 | End: 2023-11-20

## 2023-11-19 RX ORDER — OXYCODONE HYDROCHLORIDE 5 MG/1
5 TABLET ORAL EVERY 6 HOURS
Refills: 0 | Status: DISCONTINUED | OUTPATIENT
Start: 2023-11-19 | End: 2023-11-26

## 2023-11-19 RX ORDER — HYDROMORPHONE HYDROCHLORIDE 2 MG/ML
0.5 INJECTION INTRAMUSCULAR; INTRAVENOUS; SUBCUTANEOUS
Refills: 0 | Status: DISCONTINUED | OUTPATIENT
Start: 2023-11-19 | End: 2023-11-22

## 2023-11-19 RX ORDER — HYDROMORPHONE HYDROCHLORIDE 2 MG/ML
1 INJECTION INTRAMUSCULAR; INTRAVENOUS; SUBCUTANEOUS
Refills: 0 | Status: DISCONTINUED | OUTPATIENT
Start: 2023-11-19 | End: 2023-11-19

## 2023-11-19 RX ORDER — INSULIN LISPRO 100/ML
VIAL (ML) SUBCUTANEOUS AT BEDTIME
Refills: 0 | Status: DISCONTINUED | OUTPATIENT
Start: 2023-11-19 | End: 2023-11-30

## 2023-11-19 RX ORDER — INSULIN LISPRO 100/ML
VIAL (ML) SUBCUTANEOUS
Refills: 0 | Status: DISCONTINUED | OUTPATIENT
Start: 2023-11-19 | End: 2023-11-30

## 2023-11-19 RX ADMIN — AMLODIPINE BESYLATE 5 MILLIGRAM(S): 2.5 TABLET ORAL at 12:56

## 2023-11-19 RX ADMIN — CHLORHEXIDINE GLUCONATE 1 APPLICATION(S): 213 SOLUTION TOPICAL at 12:57

## 2023-11-19 RX ADMIN — Medication 650 MILLIGRAM(S): at 01:13

## 2023-11-19 RX ADMIN — Medication 650 MILLIGRAM(S): at 00:13

## 2023-11-19 RX ADMIN — HYDROMORPHONE HYDROCHLORIDE 30 MILLILITER(S): 2 INJECTION INTRAMUSCULAR; INTRAVENOUS; SUBCUTANEOUS at 17:31

## 2023-11-19 RX ADMIN — HYDROMORPHONE HYDROCHLORIDE 1 MILLIGRAM(S): 2 INJECTION INTRAMUSCULAR; INTRAVENOUS; SUBCUTANEOUS at 10:26

## 2023-11-19 RX ADMIN — Medication 250 MILLIGRAM(S): at 17:18

## 2023-11-19 RX ADMIN — OXYCODONE HYDROCHLORIDE 5 MILLIGRAM(S): 5 TABLET ORAL at 07:28

## 2023-11-19 RX ADMIN — Medication 25 GRAM(S): at 05:58

## 2023-11-19 RX ADMIN — OXYCODONE HYDROCHLORIDE 5 MILLIGRAM(S): 5 TABLET ORAL at 08:20

## 2023-11-19 RX ADMIN — SODIUM CHLORIDE 75 MILLILITER(S): 9 INJECTION, SOLUTION INTRAVENOUS at 05:26

## 2023-11-19 RX ADMIN — HYDROMORPHONE HYDROCHLORIDE 1 MILLIGRAM(S): 2 INJECTION INTRAMUSCULAR; INTRAVENOUS; SUBCUTANEOUS at 10:11

## 2023-11-19 RX ADMIN — AMLODIPINE BESYLATE 5 MILLIGRAM(S): 2.5 TABLET ORAL at 05:28

## 2023-11-19 RX ADMIN — Medication 1: at 12:56

## 2023-11-19 RX ADMIN — Medication 1: at 07:51

## 2023-11-19 RX ADMIN — HYDROMORPHONE HYDROCHLORIDE 30 MILLILITER(S): 2 INJECTION INTRAMUSCULAR; INTRAVENOUS; SUBCUTANEOUS at 19:18

## 2023-11-19 NOTE — PROGRESS NOTE ADULT - ASSESSMENT
63 M pmhx htn, dm, hld presents to ED from rehab for acute limb ischemia.    Acute Limb  Ischemia   CTA A/P with b/l LE run-noff significant for right external iliac artery occlusion with no reconstitution of flow to the distal RLE. 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     Vascular Podiatry consulted   iv Heparin   Wound Care consult appreciated   ID consult appreciated   Patient agreeing for Amputation   Moniotr off abx   CONS likely contaminant     Cards eval appreciated   Pending Echo     Patient high risk for surgery       OLIVER Contrats induced    IVF to 0.45NS + 75mEq NaHCO3 and reduce rate to 75cc/hr     DM HISS   A1C     HTN Home meds        63 M pmhx htn, dm, hld presents to ED from rehab for acute limb ischemia.    Acute Limb  Ischemia   CTA A/P with b/l LE run-noff significant for right external iliac artery occlusion with no reconstitution of flow to the distal RLE. 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       OR today   Vascular Podiatry consulted     Wound Care consult appreciated   ID consult appreciated   Patient agreeing for Amputation   Moniotr off abx   CONS likely contaminant     Cards eval appreciated   Pending Echo     Patient high risk for surgery       OLIVER Contrats induced    IVF to 0.45NS + 75mEq NaHCO3 and reduce rate to 75cc/hr     DM HISS   A1C     HTN Home meds

## 2023-11-19 NOTE — PROGRESS NOTE ADULT - SUBJECTIVE AND OBJECTIVE BOX
Patient is a 63y old  Male who presents with a chief complaint of Rt foot pain (19 Nov 2023 08:24)      SUBJECTIVE / OVERNIGHT EVENTS:     MEDICATIONS  (STANDING):  amLODIPine   Tablet 5 milliGRAM(s) Oral daily  chlorhexidine 2% Cloths 1 Application(s) Topical daily  dextrose 5%. 1000 milliLiter(s) (50 mL/Hr) IV Continuous <Continuous>  dextrose 5%. 1000 milliLiter(s) (100 mL/Hr) IV Continuous <Continuous>  dextrose 50% Injectable 25 Gram(s) IV Push once  dextrose 50% Injectable 12.5 Gram(s) IV Push once  dextrose 50% Injectable 25 Gram(s) IV Push once  glucagon  Injectable 1 milliGRAM(s) IntraMuscular once  HYDROmorphone PCA (1 mG/mL) 30 milliLiter(s) PCA Continuous PCA Continuous  influenza   Vaccine 0.5 milliLiter(s) IntraMuscular once  insulin lispro (ADMELOG) corrective regimen sliding scale   SubCutaneous three times a day before meals  insulin lispro (ADMELOG) corrective regimen sliding scale   SubCutaneous at bedtime  sodium chloride 0.45% 1000 milliLiter(s) (75 mL/Hr) IV Continuous <Continuous>  vancomycin  IVPB 1000 milliGRAM(s) IV Intermittent every 24 hours    MEDICATIONS  (PRN):  acetaminophen     Tablet .. 650 milliGRAM(s) Oral every 6 hours PRN Temp greater or equal to 38C (100.4F)  dextrose Oral Gel 15 Gram(s) Oral once PRN Blood Glucose LESS THAN 70 milliGRAM(s)/deciliter  HYDROmorphone PCA (1 mG/mL) Rescue Clinician Bolus 0.5 milliGRAM(s) IV Push every 15 minutes PRN for Pain Scale GREATER THAN 6  nalbuphine Injectable 2.5 milliGRAM(s) IV Push every 6 hours PRN Pruritus  naloxone Injectable 0.1 milliGRAM(s) IV Push every 3 minutes PRN For ANY of the following changes in patient status:  A. RR LESS THAN 10 breaths per minute, B. Oxygen saturation LESS THAN 90%, C. Sedation score of 6  ondansetron Injectable 4 milliGRAM(s) IV Push every 6 hours PRN Nausea  ondansetron Injectable 4 milliGRAM(s) IV Push once PRN Nausea and/or Vomiting  oxyCODONE    IR 5 milliGRAM(s) Oral every 6 hours PRN Severe Pain (7 - 10)      CAPILLARY BLOOD GLUCOSE      POCT Blood Glucose.: 165 mg/dL (19 Nov 2023 21:48)  POCT Blood Glucose.: 203 mg/dL (19 Nov 2023 17:51)  POCT Blood Glucose.: 182 mg/dL (19 Nov 2023 17:25)  POCT Blood Glucose.: 188 mg/dL (19 Nov 2023 12:41)  POCT Blood Glucose.: 191 mg/dL (19 Nov 2023 10:12)  POCT Blood Glucose.: 163 mg/dL (19 Nov 2023 07:38)    I&O's Summary    18 Nov 2023 07:01  -  19 Nov 2023 07:00  --------------------------------------------------------  IN: 2089 mL / OUT: 400 mL / NET: 1689 mL    19 Nov 2023 07:01  -  19 Nov 2023 22:46  --------------------------------------------------------  IN: 800 mL / OUT: 885 mL / NET: -85 mL      T(C): 36.9 (11-19-23 @ 21:34), Max: 36.9 (11-19-23 @ 21:34)  HR: 85 (11-19-23 @ 21:34) (75 - 91)  BP: 135/74 (11-19-23 @ 21:34) (128/71 - 143/77)  RR: 17 (11-19-23 @ 21:34) (12 - 18)  SpO2: 94% (11-19-23 @ 21:34) (93% - 98%)    PHYSICAL EXAM:  GENERAL: NAD, well-developed  HEAD:  Atraumatic, Normocephalic  EYES: EOMI, PERRLA, conjunctiva and sclera clear  NECK: Supple, No JVD  CHEST/LUNG: Clear to auscultation bilaterally; No wheeze  HEART: Regular rate and rhythm; No murmurs, rubs, or gallops  ABDOMEN: Soft, Nontender, Nondistended; Bowel sounds present  EXTREMITIES:  2+ Peripheral Pulses, No clubbing, cyanosis, or edema  PSYCH: AAOx3  NEUROLOGY: non-focal  SKIN: No rashes or lesions    LABS:                        9.2    18.72 )-----------( 234      ( 19 Nov 2023 04:31 )             28.2     11-19    129<L>  |  98  |  43<H>  ----------------------------<  153<H>  5.0   |  18<L>  |  4.16<H>    Ca    7.9<L>      19 Nov 2023 04:31  Phos  4.2     11-19  Mg     1.7     11-19      PTT - ( 19 Nov 2023 05:40 )  PTT:28.1 sec      Urinalysis Basic - ( 19 Nov 2023 04:31 )    Color: x / Appearance: x / SG: x / pH: x  Gluc: 153 mg/dL / Ketone: x  / Bili: x / Urobili: x   Blood: x / Protein: x / Nitrite: x   Leuk Esterase: x / RBC: x / WBC x   Sq Epi: x / Non Sq Epi: x / Bacteria: x        RADIOLOGY & ADDITIONAL TESTS:    Imaging Personally Reviewed:    Consultant(s) Notes Reviewed:      Care Discussed with Consultants/Other Providers:

## 2023-11-19 NOTE — PROGRESS NOTE ADULT - ASSESSMENT
63M PMHx HTN, DM. HLD, presents to ED from rehab for acute limb ischemia. Patient was discharged about 1 week ago from OSH to rehab for conservative medical treatment of foot infection and now brought to ED for increased pain and acute discoloration of foot. Does not weightbare on right leg and has not ambulated in months.         Plan:  - Patient now amenable to BKA stage one 11/19. Would appreciate medical and cardiac optimization/risk stratification/clearance  - recommend continuing hep gtt until 6 hours pre-op - paused at 1:30 AM  - f/u podiatry local wound care recommendations  - vascular surgery to follow, page with any questions/concerns      Vascular surgery   [id number]

## 2023-11-19 NOTE — PROGRESS NOTE ADULT - SUBJECTIVE AND OBJECTIVE BOX
Plastic Surgery Progress Note (pg [facility]: [id number], [facility]: [phone])    SUBJECTIVE  The patient was seen and examined.     OBJECTIVE  ___________________________________________________  VITAL SIGNS / I&O's   Vital Signs Last 24 Hrs  T(C): 37.2 (19 Nov 2023 00:20), Max: 37.4 (18 Nov 2023 05:25)  T(F): 99 (19 Nov 2023 00:20), Max: 99.3 (18 Nov 2023 05:25)  HR: 89 (19 Nov 2023 00:20) (80 - 93)  BP: 135/83 (19 Nov 2023 00:20) (135/83 - 158/88)  BP(mean): --  RR: 18 (19 Nov 2023 00:20) (18 - 18)  SpO2: 94% (19 Nov 2023 00:20) (94% - 97%)    Parameters below as of 19 Nov 2023 00:20  Patient On (Oxygen Delivery Method): room air          17 Nov 2023 07:01  -  18 Nov 2023 07:00  --------------------------------------------------------  IN:    Heparin Infusion: 253 mL    Lactated Ringers: 3175 mL    Oral Fluid: 1015 mL  Total IN: 4443 mL    OUT:    Voided (mL): 50 mL  Total OUT: 50 mL    Total NET: 4393 mL      18 Nov 2023 07:01  -  19 Nov 2023 04:58  --------------------------------------------------------  IN:    Heparin Infusion: 99 mL    Oral Fluid: 940 mL    sodium chloride 0.45% w/ Additives: 675 mL  Total IN: 1714 mL    OUT:    Voided (mL): 250 mL  Total OUT: 250 mL    Total NET: 1464 mL        ___________________________________________________  PHYSICAL EXAM    General: No acute distress  Respiratory: Nonlabored  Extremities: right lower extremity: severe diffuse discoloration on whole right foot with blisters on first toe, and ball of foot;  Right foot partial thickness dry gangrene digits 1-5, dorsal medial midfoot to anterior medial ankle ischemic changes, no pus, no tracking or tunneling, no malodor, no bogginess or fluctuance.   right DP/PT/distal AT pulses nonpalpable/non dopplerable; dopplerable right popliteal signal and proximal AT signal; palpable right femoral arterial pulse  Left lower extremity: palpable femoral arterial, popliteal, AT/DP/PT pulses palpable     ___________________________________________________  LABS                        9.3    17.78 )-----------( 178      ( 18 Nov 2023 07:10 )             28.8     19 Nov 2023 04:31    129    |  98     |  43     ----------------------------<  153    5.0     |  18     |  4.16     Ca    7.9        19 Nov 2023 04:31  Phos  4.2       19 Nov 2023 04:31  Mg     1.7       19 Nov 2023 04:31    TPro  5.2    /  Alb  2.1    /  TBili  0.5    /  DBili  x      /  AST  42     /  ALT  25     /  AlkPhos  79     17 Nov 2023 06:49    PTT - ( 18 Nov 2023 22:27 )  PTT:61.2 sec  CAPILLARY BLOOD GLUCOSE      POCT Blood Glucose.: 224 mg/dL (18 Nov 2023 21:51)  POCT Blood Glucose.: 300 mg/dL (18 Nov 2023 17:10)  POCT Blood Glucose.: 229 mg/dL (18 Nov 2023 11:34)  POCT Blood Glucose.: 235 mg/dL (18 Nov 2023 09:23)        Urinalysis Basic - ( 19 Nov 2023 04:31 )    Color: x / Appearance: x / SG: x / pH: x  Gluc: 153 mg/dL / Ketone: x  / Bili: x / Urobili: x   Blood: x / Protein: x / Nitrite: x   Leuk Esterase: x / RBC: x / WBC x   Sq Epi: x / Non Sq Epi: x / Bacteria: x      ___________________________________________________  MICRO  Recent Cultures:  Specimen Source: .Blood [facility], 11-16 @ 19:34; Results   Growth in aerobic and anaerobic bottles: Staphylococcus epidermidis<!>; Gram Stain:   Growth in anaerobic bottle: Gram Positive Cocci in Clusters  Growth in aerobic bottle: Gram Positive Cocci in Clusters<!>; Organism: --  [facility] @ 19:33; Results   Growth in aerobic and anaerobic bottles: Staphylococcus epidermidis  See previous culture [id number]<!>; Gram Stain:   Growth in aerobic bottle: Gram Positive Cocci in Clusters  Growth in anaerobic bottle: Gram Positive Cocci in Clusters<!>; Organism: --  Specimen [facility], 11-15 @ 12:50; Results   Growth in aerobic and anaerobic bottles: Staphylococcus epidermidis<!>; Gram Stain:   Growth in anaerobic bottle: Gram Positive Cocci in Clusters  Growth in aerobic bottle: Gram Positive Cocci in Clusters<!>; Organism: Staphylococcus epidermidis<!>  Specimen [facility], [facility] @ 12:35; Results   Growth in aerobic and anaerobic bottles: Staphylococcus epidermidis  Direct identification is available within approximately 3-5  hours either by Blood Panel Multiplexed PCR or Direct  MALDI-TOF. Details: [email or web address]<!>; Gram Stain:   Growth in aerobic bottle: Gram Positive Cocci in Clusters  Growth in anaerobic bottle: Gram Positive Cocci in Clusters<!>; Organism: Blood Culture PCR<!>    ___________________________________________________  MEDICATIONS  (STANDING):  amLODIPine   Tablet 5 milliGRAM(s) Oral daily  chlorhexidine 2% Cloths 1 Application(s) Topical daily  dextrose 5%. 1000 milliLiter(s) (50 mL/Hr) IV Continuous <Continuous>  dextrose 5%. 1000 milliLiter(s) (100 mL/Hr) IV Continuous <Continuous>  dextrose 50% Injectable 25 Gram(s) IV Push once  dextrose 50% Injectable 12.5 Gram(s) IV Push once  dextrose 50% Injectable 25 Gram(s) IV Push once  glucagon  Injectable 1 milliGRAM(s) IntraMuscular once  influenza   Vaccine 0.5 milliLiter(s) IntraMuscular once  insulin lispro (ADMELOG) corrective regimen sliding scale   SubCutaneous three times a day before meals  insulin lispro (ADMELOG) corrective regimen sliding scale   SubCutaneous at bedtime  sodium chloride 0.45% 1000 milliLiter(s) (75 mL/Hr) IV Continuous <Continuous>  vancomycin  IVPB      vancomycin  IVPB 1000 milliGRAM(s) IV Intermittent every 24 hours    MEDICATIONS  (PRN):  acetaminophen     Tablet .. 650 milliGRAM(s) Oral every 6 hours PRN Temp greater or equal to 38C (100.4F)  dextrose Oral Gel 15 Gram(s) Oral once PRN Blood Glucose LESS THAN 70 milliGRAM(s)/deciliter  oxyCODONE    IR 5 milliGRAM(s) Oral every 6 hours PRN Severe Pain (7 - 10)

## 2023-11-19 NOTE — CHART NOTE - NSCHARTNOTEFT_GEN_A_CORE
POST-OP NOTE    AIXA LARSON | [id number] | [facility] 9MON 912 D1    Procedure: 63M s/p MARNI ESPARZA     Subjective: CINDI since OR. Pt seen and evaluated bedside. Pt resting comfortably on exam. Tolerating Diet. Planned for PCA, has not started yet.    Vital Signs Last 24 Hrs  T(C): 36.6 (19 Nov 2023 14:35), Max: 37.3 (19 Nov 2023 05:17)  T(F): 97.9 (19 Nov 2023 14:35), Max: 99.1 (19 Nov 2023 05:17)  HR: 75 (19 Nov 2023 14:35) (75 - 95)  BP: 133/75 (19 Nov 2023 14:35) (116/71 - 156/85)  BP(mean): 97 (19 Nov 2023 11:00) (85 - 97)  RR: 17 (19 Nov 2023 14:35) (12 - 18)  SpO2: 94% (19 Nov 2023 14:35) (93% - 97%)    Parameters below as of 19 Nov 2023 14:35  Patient On (Oxygen Delivery Method): room air      I&O's Summary    18 Nov 2023 07:01  -  19 Nov 2023 07:00  --------------------------------------------------------  IN: 2089 mL / OUT: 400 mL / NET: 1689 mL    19 Nov 2023 07:01  -  19 Nov 2023 16:01  --------------------------------------------------------  IN: 280 mL / OUT: 35 mL / NET: 245 mL                            9.2    18.72 )-----------( 234      ( 19 Nov 2023 04:31 )             28.2     11-19    129<L>  |  98  |  43<H>  ----------------------------<  153<H>  5.0   |  18<L>  |  4.16<H>    Ca    7.9<L>      19 Nov 2023 04:31  Phos  4.2     11-19  Mg     1.7     11-19     PTT - ( 19 Nov 2023 05:40 )  PTT:28.1 sec    PHYSICAL EXAM:  Gen: NAD  Resp: breathing easily, no stridor  CV: RRR  Abdomen: soft, nontender, nondistended  Ext: RLE s/p guillotine amp- hemostatic, dressing mildly saturated, changed during exam.    A/P: 63M s/p R annelotalbert BKA recovering as expected postoperatively.    Plan:  - Does not need hep gtt restarted  - Reg diet   - Pain control w PCA, acute pain c/s placed  - Wound care: BID dressing changes  - Page Vascular Surgery with any questions/concerns    Vascular Surgery   [id number]

## 2023-11-19 NOTE — CHART NOTE - NSCHARTNOTEFT_GEN_A_CORE
As per vascular and medical team , pt is planned to have emergent BKA given extent of sepsis   can proceed to acceptable high CV risks

## 2023-11-19 NOTE — PROGRESS NOTE ADULT - SUBJECTIVE AND OBJECTIVE BOX
NEPHROLOGY     Patient seen and examined resting in bed, no new complaints, in no acute distress.     MEDICATIONS  (STANDING):  amLODIPine   Tablet 5 milliGRAM(s) Oral daily  chlorhexidine 2% Cloths 1 Application(s) Topical daily  dextrose 5%. 1000 milliLiter(s) (50 mL/Hr) IV Continuous <Continuous>  dextrose 5%. 1000 milliLiter(s) (100 mL/Hr) IV Continuous <Continuous>  dextrose 50% Injectable 25 Gram(s) IV Push once  dextrose 50% Injectable 12.5 Gram(s) IV Push once  dextrose 50% Injectable 25 Gram(s) IV Push once  glucagon  Injectable 1 milliGRAM(s) IntraMuscular once  influenza   Vaccine 0.5 milliLiter(s) IntraMuscular once  insulin lispro (ADMELOG) corrective regimen sliding scale   SubCutaneous three times a day before meals  insulin lispro (ADMELOG) corrective regimen sliding scale   SubCutaneous at bedtime  sodium chloride 0.45% 1000 milliLiter(s) (75 mL/Hr) IV Continuous <Continuous>  vancomycin  IVPB      vancomycin  IVPB 1000 milliGRAM(s) IV Intermittent every 24 hours    VITALS:  T(C): , Max: 37.3 (11-19-23 @ 05:17)  T(F): , Max: 99.1 (11-19-23 @ 05:17)  HR: 85 (11-19-23 @ 05:17)  BP: 152/76 (11-19-23 @ 05:17)  RR: 18 (11-19-23 @ 05:17)  SpO2: 94% (11-19-23 @ 05:17)    I and O's:    11-18 @ 07:01  -  11-19 @ 07:00  --------------------------------------------------------  IN: 2089 mL / OUT: 400 mL / NET: 1689 mL    PHYSICAL EXAM:  Constitutional: NAD; cachetic   Neck:  No JVD  Respiratory: reduce   Cardiovascular: S1 and S2  Gastrointestinal: BS+, soft, NT/ND  Extremities: No peripheral edema, rle with dsg   Neurological: A/O x 3, no focal deficits  Psychiatric: Normal mood, normal affect  : No Pacheco  Skin: No rashes    LABS:                        9.2    18.72 )-----------( 234      ( 19 Nov 2023 04:31 )             28.2     11-19    129<L>  |  98  |  43<H>  ----------------------------<  153<H>  5.0   |  18<L>  |  4.16<H>    Ca    7.9<L>      19 Nov 2023 04:31  Phos  4.2     11-19  Mg     1.7     11-19    ASSESSMENT/PLAN:   62 y/o male with pmhx htn, dm, hld  presents to the ED sent in from rehab center for right foot discoloration and pain  Hyponatremia   Metabolic acidosis   Wedge-shaped hypoattenuating focus of the left kidney concerning for renal infarct or focus of  infection/pyelonephritis.  Distended bladder without hydro  OLIVER following contrast   High grade bacteremia     1 Renal - creatinine trending higher, likely BHAKTI at present. Supportive care   Check bladder scan   Maintain low potassium diet no longer NPO  Continue IVF to 0.45NS + 75mEq NaHCO3 at 75cc/hr   Eventual renal dopplers to assess possible infarct vs infection   2 Vasc - BKA planned for today; Needs source control   3 ID -Infectious disease is following and awaiting sensitivities, on vanco   trough 13.2 (11/18)     D/w Dr. Sri Haile, [facility]  [phone]

## 2023-11-20 LAB
ANION GAP SERPL CALC-SCNC: 13 MMOL/L — SIGNIFICANT CHANGE UP (ref 5–17)
ANION GAP SERPL CALC-SCNC: 13 MMOL/L — SIGNIFICANT CHANGE UP (ref 5–17)
BUN SERPL-MCNC: 40 MG/DL — HIGH (ref 7–23)
BUN SERPL-MCNC: 40 MG/DL — HIGH (ref 7–23)
CALCIUM SERPL-MCNC: 8.2 MG/DL — LOW (ref 8.4–10.5)
CALCIUM SERPL-MCNC: 8.2 MG/DL — LOW (ref 8.4–10.5)
CHLORIDE SERPL-SCNC: 99 MMOL/L — SIGNIFICANT CHANGE UP (ref 96–108)
CHLORIDE SERPL-SCNC: 99 MMOL/L — SIGNIFICANT CHANGE UP (ref 96–108)
CO2 SERPL-SCNC: 21 MMOL/L — LOW (ref 22–31)
CO2 SERPL-SCNC: 21 MMOL/L — LOW (ref 22–31)
CREAT SERPL-MCNC: 3.72 MG/DL — HIGH (ref 0.5–1.3)
CREAT SERPL-MCNC: 3.72 MG/DL — HIGH (ref 0.5–1.3)
EGFR: 17 ML/MIN/1.73M2 — LOW
EGFR: 17 ML/MIN/1.73M2 — LOW
GLUCOSE BLDC GLUCOMTR-MCNC: 136 MG/DL — HIGH (ref 70–99)
GLUCOSE BLDC GLUCOMTR-MCNC: 136 MG/DL — HIGH (ref 70–99)
GLUCOSE BLDC GLUCOMTR-MCNC: 165 MG/DL — HIGH (ref 70–99)
GLUCOSE BLDC GLUCOMTR-MCNC: 165 MG/DL — HIGH (ref 70–99)
GLUCOSE BLDC GLUCOMTR-MCNC: 176 MG/DL — HIGH (ref 70–99)
GLUCOSE BLDC GLUCOMTR-MCNC: 176 MG/DL — HIGH (ref 70–99)
GLUCOSE BLDC GLUCOMTR-MCNC: 240 MG/DL — HIGH (ref 70–99)
GLUCOSE BLDC GLUCOMTR-MCNC: 240 MG/DL — HIGH (ref 70–99)
GLUCOSE SERPL-MCNC: 130 MG/DL — HIGH (ref 70–99)
GLUCOSE SERPL-MCNC: 130 MG/DL — HIGH (ref 70–99)
HCT VFR BLD CALC: 31.3 % — LOW (ref 39–50)
HCT VFR BLD CALC: 31.3 % — LOW (ref 39–50)
HGB BLD-MCNC: 10.1 G/DL — LOW (ref 13–17)
HGB BLD-MCNC: 10.1 G/DL — LOW (ref 13–17)
MCHC RBC-ENTMCNC: 24.9 PG — LOW (ref 27–34)
MCHC RBC-ENTMCNC: 24.9 PG — LOW (ref 27–34)
MCHC RBC-ENTMCNC: 32.3 GM/DL — SIGNIFICANT CHANGE UP (ref 32–36)
MCHC RBC-ENTMCNC: 32.3 GM/DL — SIGNIFICANT CHANGE UP (ref 32–36)
MCV RBC AUTO: 77.1 FL — LOW (ref 80–100)
MCV RBC AUTO: 77.1 FL — LOW (ref 80–100)
NRBC # BLD: 0 /100 WBCS — SIGNIFICANT CHANGE UP (ref 0–0)
NRBC # BLD: 0 /100 WBCS — SIGNIFICANT CHANGE UP (ref 0–0)
PLATELET # BLD AUTO: 216 K/UL — SIGNIFICANT CHANGE UP (ref 150–400)
PLATELET # BLD AUTO: 216 K/UL — SIGNIFICANT CHANGE UP (ref 150–400)
POTASSIUM SERPL-MCNC: 4.8 MMOL/L — SIGNIFICANT CHANGE UP (ref 3.5–5.3)
POTASSIUM SERPL-MCNC: 4.8 MMOL/L — SIGNIFICANT CHANGE UP (ref 3.5–5.3)
POTASSIUM SERPL-SCNC: 4.8 MMOL/L — SIGNIFICANT CHANGE UP (ref 3.5–5.3)
POTASSIUM SERPL-SCNC: 4.8 MMOL/L — SIGNIFICANT CHANGE UP (ref 3.5–5.3)
RBC # BLD: 4.06 M/UL — LOW (ref 4.2–5.8)
RBC # BLD: 4.06 M/UL — LOW (ref 4.2–5.8)
RBC # FLD: 16.8 % — HIGH (ref 10.3–14.5)
RBC # FLD: 16.8 % — HIGH (ref 10.3–14.5)
SODIUM SERPL-SCNC: 133 MMOL/L — LOW (ref 135–145)
SODIUM SERPL-SCNC: 133 MMOL/L — LOW (ref 135–145)
VANCOMYCIN TROUGH SERPL-MCNC: 21.2 UG/ML — HIGH (ref 10–20)
VANCOMYCIN TROUGH SERPL-MCNC: 21.2 UG/ML — HIGH (ref 10–20)
WBC # BLD: 13.42 K/UL — HIGH (ref 3.8–10.5)
WBC # BLD: 13.42 K/UL — HIGH (ref 3.8–10.5)
WBC # FLD AUTO: 13.42 K/UL — HIGH (ref 3.8–10.5)
WBC # FLD AUTO: 13.42 K/UL — HIGH (ref 3.8–10.5)

## 2023-11-20 PROCEDURE — 93306 TTE W/DOPPLER COMPLETE: CPT | Mod: 26

## 2023-11-20 PROCEDURE — 99233 SBSQ HOSP IP/OBS HIGH 50: CPT

## 2023-11-20 RX ORDER — VANCOMYCIN HCL 1 G
750 VIAL (EA) INTRAVENOUS ONCE
Refills: 0 | Status: COMPLETED | OUTPATIENT
Start: 2023-11-20 | End: 2023-11-20

## 2023-11-20 RX ORDER — VANCOMYCIN HCL 1 G
750 VIAL (EA) INTRAVENOUS EVERY 24 HOURS
Refills: 0 | Status: DISCONTINUED | OUTPATIENT
Start: 2023-11-21 | End: 2023-11-23

## 2023-11-20 RX ORDER — SODIUM CHLORIDE 9 MG/ML
1000 INJECTION, SOLUTION INTRAVENOUS
Refills: 0 | Status: DISCONTINUED | OUTPATIENT
Start: 2023-11-20 | End: 2023-11-21

## 2023-11-20 RX ORDER — VANCOMYCIN HCL 1 G
VIAL (EA) INTRAVENOUS
Refills: 0 | Status: DISCONTINUED | OUTPATIENT
Start: 2023-11-20 | End: 2023-11-23

## 2023-11-20 RX ORDER — VANCOMYCIN HCL 1 G
VIAL (EA) INTRAVENOUS
Refills: 0 | Status: DISCONTINUED | OUTPATIENT
Start: 2023-11-20 | End: 2023-11-20

## 2023-11-20 RX ADMIN — HYDROMORPHONE HYDROCHLORIDE 30 MILLILITER(S): 2 INJECTION INTRAMUSCULAR; INTRAVENOUS; SUBCUTANEOUS at 08:08

## 2023-11-20 RX ADMIN — SODIUM CHLORIDE 75 MILLILITER(S): 9 INJECTION, SOLUTION INTRAVENOUS at 05:51

## 2023-11-20 RX ADMIN — HYDROMORPHONE HYDROCHLORIDE 0.5 MILLIGRAM(S): 2 INJECTION INTRAMUSCULAR; INTRAVENOUS; SUBCUTANEOUS at 13:37

## 2023-11-20 RX ADMIN — CHLORHEXIDINE GLUCONATE 1 APPLICATION(S): 213 SOLUTION TOPICAL at 11:06

## 2023-11-20 RX ADMIN — Medication 1: at 14:40

## 2023-11-20 RX ADMIN — HYDROMORPHONE HYDROCHLORIDE 0.5 MILLIGRAM(S): 2 INJECTION INTRAMUSCULAR; INTRAVENOUS; SUBCUTANEOUS at 15:25

## 2023-11-20 RX ADMIN — HYDROMORPHONE HYDROCHLORIDE 30 MILLILITER(S): 2 INJECTION INTRAMUSCULAR; INTRAVENOUS; SUBCUTANEOUS at 19:38

## 2023-11-20 RX ADMIN — Medication 250 MILLIGRAM(S): at 21:54

## 2023-11-20 RX ADMIN — AMLODIPINE BESYLATE 5 MILLIGRAM(S): 2.5 TABLET ORAL at 05:51

## 2023-11-20 RX ADMIN — Medication 1: at 10:36

## 2023-11-20 NOTE — PROGRESS NOTE ADULT - ASSESSMENT
63 M pmhx htn, dm, hld presents to ED from rehab for acute limb ischemia.    Acute Limb  Ischemia   CTA A/P with b/l LE run-noff significant for right external iliac artery occlusion with no reconstitution of flow to the distal RLE. 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       s/p BKA   Vascular Podiatry consulted     Wound Care consult appreciated   ID consult appreciated   Patient agreeing for Amputation   Moniotr off abx   CONS likely contaminant     Cards eval appreciated   Pending Echo     Patient high risk for surgery       OLIVER Contrats induced    IVF to 0.45NS + 75mEq NaHCO3 and reduce rate to 75cc/hr     DM HISS   A1C     HTN Home meds

## 2023-11-20 NOTE — PROGRESS NOTE ADULT - SUBJECTIVE AND OBJECTIVE BOX
Patient is a 63y old  Male who presents with a chief complaint of Rt foot pain (19 Nov 2023 08:24)      SUBJECTIVE / OVERNIGHT EVENTS: no events   s/p BKA     MEDICATIONS  (STANDING):  amLODIPine   Tablet 5 milliGRAM(s) Oral daily  chlorhexidine 2% Cloths 1 Application(s) Topical daily  dextrose 5%. 1000 milliLiter(s) (50 mL/Hr) IV Continuous <Continuous>  dextrose 5%. 1000 milliLiter(s) (100 mL/Hr) IV Continuous <Continuous>  dextrose 50% Injectable 25 Gram(s) IV Push once  dextrose 50% Injectable 12.5 Gram(s) IV Push once  dextrose 50% Injectable 25 Gram(s) IV Push once  glucagon  Injectable 1 milliGRAM(s) IntraMuscular once  HYDROmorphone PCA (1 mG/mL) 30 milliLiter(s) PCA Continuous PCA Continuous  influenza   Vaccine 0.5 milliLiter(s) IntraMuscular once  insulin lispro (ADMELOG) corrective regimen sliding scale   SubCutaneous three times a day before meals  insulin lispro (ADMELOG) corrective regimen sliding scale   SubCutaneous at bedtime  sodium chloride 0.45% 1000 milliLiter(s) (75 mL/Hr) IV Continuous <Continuous>  vancomycin  IVPB 1000 milliGRAM(s) IV Intermittent every 24 hours    MEDICATIONS  (PRN):  acetaminophen     Tablet .. 650 milliGRAM(s) Oral every 6 hours PRN Temp greater or equal to 38C (100.4F)  dextrose Oral Gel 15 Gram(s) Oral once PRN Blood Glucose LESS THAN 70 milliGRAM(s)/deciliter  HYDROmorphone PCA (1 mG/mL) Rescue Clinician Bolus 0.5 milliGRAM(s) IV Push every 15 minutes PRN for Pain Scale GREATER THAN 6  nalbuphine Injectable 2.5 milliGRAM(s) IV Push every 6 hours PRN Pruritus  naloxone Injectable 0.1 milliGRAM(s) IV Push every 3 minutes PRN For ANY of the following changes in patient status:  A. RR LESS THAN 10 breaths per minute, B. Oxygen saturation LESS THAN 90%, C. Sedation score of 6  ondansetron Injectable 4 milliGRAM(s) IV Push every 6 hours PRN Nausea  ondansetron Injectable 4 milliGRAM(s) IV Push once PRN Nausea and/or Vomiting  oxyCODONE    IR 5 milliGRAM(s) Oral every 6 hours PRN Severe Pain (7 - 10)      CAPILLARY BLOOD GLUCOSECAPILLARY BLOOD GLUCOSE      POCT Blood Glucose.: 240 mg/dL (20 Nov 2023 21:55)  POCT Blood Glucose.: 136 mg/dL (20 Nov 2023 17:17)  POCT Blood Glucose.: 176 mg/dL (20 Nov 2023 14:39)  POCT Blood Glucose.: 165 mg/dL (20 Nov 2023 10:04)  I&O's Summary    18 Nov 2023 07:01  -  19 Nov 2023 07:00  --------------------------------------------------------  IN: 2089 mL / OUT: 400 mL / NET: 1689 mL    19 Nov 2023 07:01  -  19 Nov 2023 22:46  --------------------------------------------------------  IN: 800 mL / OUT: 885 mL / NET: -85 mL      T(C): 37.3 (11-20-23 @ 21:21), Max: 37.3 (11-20-23 @ 21:21)  HR: 97 (11-20-23 @ 21:21) (85 - 97)  BP: 138/72 (11-20-23 @ 21:21) (119/71 - 138/72)  RR: 18 (11-20-23 @ 21:21) (16 - 18)  SpO2: 93% (11-20-23 @ 21:21) (92% - 94%)    PHYSICAL EXAM:  GENERAL: NAD, well-developed  HEAD:  Atraumatic, Normocephalic  EYES: EOMI, PERRLA, conjunctiva and sclera clear  NECK: Supple, No JVD  CHEST/LUNG: Clear to auscultation bilaterally; No wheeze  HEART: Regular rate and rhythm; No murmurs, rubs, or gallops  ABDOMEN: Soft, Nontender, Nondistended; Bowel sounds present  EXTREMITIES:  s/p BKA   PSYCH: AAOx3  NEUROLOGY: non-focal  SKIN: No rashes or lesions                          10.1   13.42 )-----------( 216      ( 20 Nov 2023 07:59 )             31.3             PTT - ( 19 Nov 2023 05:40 )  PTT:28.1 sec  133|99|40<130  4.8|21|3.72  8.2,--,--  11-20 @ 07:59      RADIOLOGY & ADDITIONAL TESTS:    Imaging Personally Reviewed:    Consultant(s) Notes Reviewed:      Care Discussed with Consultants/Other Providers:

## 2023-11-20 NOTE — PROGRESS NOTE ADULT - ASSESSMENT
Limb ischemia  bacteremia Gram positive   sepsis   OLIVER  sp BKA     empiric abx  has renal infarct  suspicion for IE  obtain echo , if neg then will need MARINO  fu with ID and renal   fu with vascular surgery      Advanced care planning was discussed with patient and family.  Risks, benefits and alternatives of the cardiac treatments and medical therapy including procedures were discussed in detail and all questions were answered. Importance of compliance with medical therapy and lifestyle modification to improve cardiovascular health were addressed. Appropriate forms and patient educational materials were reviewed. 30 minutes face to face spent.

## 2023-11-20 NOTE — PROGRESS NOTE ADULT - SUBJECTIVE AND OBJECTIVE BOX
Subjective: Patient seen and examined. No new events except as noted.     SUBJECTIVE/ROS:  s/p BKA      MEDICATIONS:  MEDICATIONS  (STANDING):  amLODIPine   Tablet 5 milliGRAM(s) Oral daily  chlorhexidine 2% Cloths 1 Application(s) Topical daily  dextrose 5%. 1000 milliLiter(s) (50 mL/Hr) IV Continuous <Continuous>  dextrose 5%. 1000 milliLiter(s) (100 mL/Hr) IV Continuous <Continuous>  dextrose 50% Injectable 25 Gram(s) IV Push once  dextrose 50% Injectable 25 Gram(s) IV Push once  dextrose 50% Injectable 12.5 Gram(s) IV Push once  glucagon  Injectable 1 milliGRAM(s) IntraMuscular once  HYDROmorphone PCA (1 mG/mL) 30 milliLiter(s) PCA Continuous PCA Continuous  influenza   Vaccine 0.5 milliLiter(s) IntraMuscular once  insulin lispro (ADMELOG) corrective regimen sliding scale   SubCutaneous three times a day before meals  insulin lispro (ADMELOG) corrective regimen sliding scale   SubCutaneous at bedtime  sodium chloride 0.45% 1000 milliLiter(s) (75 mL/Hr) IV Continuous <Continuous>  vancomycin  IVPB 1000 milliGRAM(s) IV Intermittent every 24 hours      PHYSICAL EXAM:  T(C): 36.4 (11-20-23 @ 05:21), Max: 36.9 (11-19-23 @ 21:34)  HR: 86 (11-20-23 @ 05:21) (75 - 95)  BP: 139/69 (11-20-23 @ 05:21) (116/71 - 143/77)  RR: 17 (11-20-23 @ 05:21) (12 - 18)  SpO2: 92% (11-20-23 @ 05:21) (92% - 98%)  Wt(kg): --  I&O's Summary    19 Nov 2023 07:01  -  20 Nov 2023 07:00  --------------------------------------------------------  IN: 2060 mL / OUT: 1085 mL / NET: 975 mL            JVP: Normal  Neck: supple  Lung: clear   CV: S1 S2 , Murmur:  Abd: soft  Ext: No edema  neuro: Awake / alert  Psych: flat affect    LABS/DATA:    CARDIAC MARKERS:                                10.1   13.42 )-----------( 216      ( 20 Nov 2023 07:59 )             31.3     11-20    133<L>  |  99  |  40<H>  ----------------------------<  130<H>  4.8   |  21<L>  |  3.72<H>    Ca    8.2<L>      20 Nov 2023 07:59  Phos  4.2     11-19  Mg     1.7     11-19      proBNP:   Lipid Profile:   HgA1c:   TSH:     TELE:  EKG:

## 2023-11-20 NOTE — PROGRESS NOTE ADULT - SUBJECTIVE AND OBJECTIVE BOX
Day 1 of Anesthesia Pain Management Service    SUBJECTIVE: Patient is doing OK with IV PCA. POD1, awaiting return to OR for definitive completion of amputation.     Pain Scale Score:	[X] Refer to charted pain scores    THERAPY:    [ ] IV PCA Morphine		[ ] 5 mg/mL	[ ] 1 mg/mL  [X] IV PCA Hydromorphone	[ ] 5 mg/mL	[X] 1 mg/mL  [ ] IV PCA Fentanyl		[ ] 50 micrograms/mL    Demand dose: 0.2 mg     Lockout: 6 minutes   Continuous Rate: 0 mg/hr  4 Hour Limit: 4 mg    MEDICATIONS  (STANDING):  amLODIPine   Tablet 5 milliGRAM(s) Oral daily  chlorhexidine 2% Cloths 1 Application(s) Topical daily  dextrose 5%. 1000 milliLiter(s) (50 mL/Hr) IV Continuous <Continuous>  dextrose 5%. 1000 milliLiter(s) (100 mL/Hr) IV Continuous <Continuous>  dextrose 50% Injectable 25 Gram(s) IV Push once  dextrose 50% Injectable 25 Gram(s) IV Push once  dextrose 50% Injectable 12.5 Gram(s) IV Push once  glucagon  Injectable 1 milliGRAM(s) IntraMuscular once  HYDROmorphone PCA (1 mG/mL) 30 milliLiter(s) PCA Continuous PCA Continuous  influenza   Vaccine 0.5 milliLiter(s) IntraMuscular once  insulin lispro (ADMELOG) corrective regimen sliding scale   SubCutaneous three times a day before meals  insulin lispro (ADMELOG) corrective regimen sliding scale   SubCutaneous at bedtime  sodium chloride 0.45% 1000 milliLiter(s) (75 mL/Hr) IV Continuous <Continuous>  vancomycin  IVPB 1000 milliGRAM(s) IV Intermittent every 24 hours    MEDICATIONS  (PRN):  acetaminophen     Tablet .. 650 milliGRAM(s) Oral every 6 hours PRN Temp greater or equal to 38C (100.4F)  dextrose Oral Gel 15 Gram(s) Oral once PRN Blood Glucose LESS THAN 70 milliGRAM(s)/deciliter  HYDROmorphone PCA (1 mG/mL) Rescue Clinician Bolus 0.5 milliGRAM(s) IV Push every 15 minutes PRN for Pain Scale GREATER THAN 6  nalbuphine Injectable 2.5 milliGRAM(s) IV Push every 6 hours PRN Pruritus  naloxone Injectable 0.1 milliGRAM(s) IV Push every 3 minutes PRN For ANY of the following changes in patient status:  A. RR LESS THAN 10 breaths per minute, B. Oxygen saturation LESS THAN 90%, C. Sedation score of 6  ondansetron Injectable 4 milliGRAM(s) IV Push every 6 hours PRN Nausea  ondansetron Injectable 4 milliGRAM(s) IV Push once PRN Nausea and/or Vomiting  oxyCODONE    IR 5 milliGRAM(s) Oral every 6 hours PRN Severe Pain (7 - 10)      OBJECTIVE:    Sedation Score:	[ X] Alert	[ ] Drowsy 	[ ] Arousable	[ ] Asleep	[ ] Unresponsive    Side Effects:	[X ] None	[ ] Nausea	[ ] Vomiting	[ ] Pruritus  		[ ] Other:    Vital Signs Last 24 Hrs  T(C): 36.4 (20 Nov 2023 05:21), Max: 36.9 (19 Nov 2023 21:34)  T(F): 97.6 (20 Nov 2023 05:21), Max: 98.5 (19 Nov 2023 21:34)  HR: 86 (20 Nov 2023 05:21) (75 - 95)  BP: 139/69 (20 Nov 2023 05:21) (116/71 - 143/77)  BP(mean): 97 (19 Nov 2023 11:00) (85 - 97)  RR: 17 (20 Nov 2023 05:21) (12 - 18)  SpO2: 92% (20 Nov 2023 05:21) (92% - 98%)    Parameters below as of 20 Nov 2023 05:21  Patient On (Oxygen Delivery Method): room air        ASSESSMENT/ PLAN    Therapy to  be:               [X] Continued   [ ] Discontinued   [ ] Changed to PRN Analgesics    Documentation and Verification of current medications:   [X] Done	[ ] Not done, not eligible    Comments: continue PCA pending final surgery.

## 2023-11-20 NOTE — PROGRESS NOTE ADULT - ASSESSMENT
Suspect CNS in blood culture contaminant from single phlebotomy  LE changes do not look primarily infectious  Doubt cardioembolic disease in this setting (eg endocarditis)  Denies any symptoms PTA of infection  If there is infection in the foot, antibiotics will not get to the target given absent perfusion  Leukocytosis likely reactive to ischemia  Patient is in pain from ischemia  D/W patient, he's amenable now to talking with surgery again re: BKA    11/17: Still not entirely convinced this coagulase-negative staphylococcal bacteremia represents true infection, however no objection to continuing vancomycin as dosed by renal.  He has acute kidney injury likely secondary to contrast.  If this is a true bacteremia related to his ischemic limb, source control remains the key issue–which in this case is below-knee amputation.  At this point in time there is no ID contraindication to proposed surgery.  11/20: POD#1 kiel ISAIAS. Bacteremia, sustained with S. epidermidis hard to disregard but somewhat surprising.     Suggestions--  Repeat Cx to document clearance of bacteremia  Continue Vancomycin  Intensive monitoring of vancomycin levels is required to monitor for toxicity.   Monitor levels and creatinine, renal following  Management of OLIVER per renal  TTE  Need for MARINO TBD.    I will be away 11/21, returning on 11/22. My colleagues with the ID service will be available for any issues in my absence and can be reached at [phone].    Ayaz Morales MD  Attending Physician  [facility]  Division of Infectious Diseases  [phone]

## 2023-11-20 NOTE — PROGRESS NOTE ADULT - ASSESSMENT
63M PMHx HTN, DM, HLD, presented to ED from rehab for acute limb ischemia. Patient was discharged about 1 week ago from OSH to rehab for conservative medical treatment of foot infection and later brought to ED for increased pain and acute discoloration of foot. Does not weightbare on right leg and has not ambulated in months. Patient now s/p R kiel ESPARZA 11/19, recovering appropriately.     Recommendations:  - DVT ppx (does not need heparin drip)  - Pain control: PCA, appreciate acute pain recs  - Wound care: BID dressing changes  - Premedicate before dressing changes as needed  - Remainder of care per primary team    Vascular Surgery   [id number]

## 2023-11-20 NOTE — PROGRESS NOTE ADULT - SUBJECTIVE AND OBJECTIVE BOX
[facility]  Division of Infectious Diseases  [phone]    Name: AIXA LARSON  Age: 63y  Gender: Male  MRN: [id number]    Interval History--  Notes reviewed.     Past Medical History--  Hypertension    Diabetes        For details regarding the patient's social history, family history, and other miscellaneous elements, please refer the initial infectious diseases consultation and/or the admitting history and physical examination for this admission.    Allergies    penicillin (Anaphylaxis)    Intolerances        Medications--  Antibiotics:  vancomycin  IVPB 1000 milliGRAM(s) IV Intermittent every 24 hours    Immunologic:  influenza   Vaccine 0.5 milliLiter(s) IntraMuscular once    Other:  acetaminophen     Tablet .. PRN  amLODIPine   Tablet  chlorhexidine 2% Cloths  dextrose 5%.  dextrose 5%.  dextrose 50% Injectable  dextrose 50% Injectable  dextrose 50% Injectable  dextrose Oral Gel PRN  glucagon  Injectable  HYDROmorphone PCA (1 mG/mL)  HYDROmorphone PCA (1 mG/mL) Rescue Clinician Bolus PRN  insulin lispro (ADMELOG) corrective regimen sliding scale  insulin lispro (ADMELOG) corrective regimen sliding scale  nalbuphine Injectable PRN  naloxone Injectable PRN  ondansetron Injectable PRN  ondansetron Injectable PRN  oxyCODONE    IR PRN  sodium chloride 0.45%      Review of Systems--  A 10-point review of systems was obtained.     Pertinent positives and negatives--  Constitutional: No fevers. No Chills. No Rigors.   Cardiovascular: No chest pain. No palpitations.  Respiratory: No shortness of breath. No cough.  Gastrointestinal: No nausea or vomiting. No diarrhea or constipation.   Psychiatric: Pleasant. Appropriate affect.    Review of systems otherwise negative except as previously noted.    Physical Examination--  Vital Signs: T(F): 97.6 (11-20-23 @ 05:21), Max: 98.5 (11-19-23 @ 21:34)  HR: 86 (11-20-23 @ 05:21)  BP: 139/69 (11-20-23 @ 05:21)  RR: 17 (11-20-23 @ 05:21)  SpO2: 92% (11-20-23 @ 05:21)  Wt(kg): --  General: Nontoxic-appearing Male in no acute distress.  HEENT: AT/NC. PERRL. EOMI. Anicteric. Conjunctiva pink and moist. Oropharynx clear. Dentition fair.  Neck: Not rigid. No sense of mass.  Nodes: None palpable.  Lungs: Clear bilaterally without rales, wheezing or rhonchi  Heart: Regular rate and rhythm. No Murmur. No rub. No gallop. No palpable thrill.  Abdomen: Bowel sounds present and normoactive. Soft. Nondistended. Nontender. No sense of mass. No organomegaly.  Back: No spinal tenderness. No costovertebral angle tenderness.   Extremities: No cyanosis or clubbing. No edema.   Skin: Warm. Dry. Good turgor. No rash. No vasculitic stigmata.  Psychiatric: Appropriate affect and mood for situation.         Laboratory Studies--  CBC                        10.1   13.42 )-----------( 216      ( 20 Nov 2023 07:59 )             31.3       Chemistries  11-20    133<L>  |  99  |  40<H>  ----------------------------<  130<H>  4.8   |  21<L>  |  3.72<H>    Ca    8.2<L>      20 Nov 2023 07:59  Phos  4.2     11-19  Mg     1.7     11-19        Culture Data    Culture - Blood (collected 16 Nov 2023 19:34)  Source: .Blood Blood-Peripheral  Gram Stain (18 Nov 2023 02:08):    Growth in anaerobic bottle: Gram Positive Cocci in Clusters    Growth in aerobic bottle: Gram Positive Cocci in Clusters  Final Report (18 Nov 2023 22:44):    Growth in aerobic and anaerobic bottles: Staphylococcus epidermidis    Culture - Blood (collected 16 Nov 2023 19:33)  Source: .Blood Blood-Peripheral  Gram Stain (18 Nov 2023 02:38):    Growth in aerobic bottle: Gram Positive Cocci in Clusters    Growth in anaerobic bottle: Gram Positive Cocci in Clusters  Final Report (18 Nov 2023 22:45):    Growth in aerobic and anaerobic bottles: Staphylococcus epidermidis    See previous culture [id number]    Culture - Blood (collected 15 Nov 2023 12:50)  Source: .Blood Blood-Peripheral  Gram Stain (16 Nov 2023 16:29):    Growth in anaerobic bottle: Gram Positive Cocci in Clusters    Growth in aerobic bottle: Gram Positive Cocci in Clusters  Final Report (18 Nov 2023 20:43):    Growth in aerobic and anaerobic bottles: Staphylococcus epidermidis  Organism: Staphylococcus epidermidis (18 Nov 2023 20:43)  Organism: Staphylococcus epidermidis (18 Nov 2023 20:43)    Culture - Blood (collected 15 Nov 2023 12:35)  Source: .Blood Blood-Peripheral  Gram Stain (16 Nov 2023 15:33):    Growth in aerobic bottle: Gram Positive Cocci in Clusters    Growth in anaerobic bottle: Gram Positive Cocci in Clusters  Final Report (17 Nov 2023 21:09):    Growth in aerobic and anaerobic bottles: Staphylococcus epidermidis    Direct identification is available within approximately 3-5    hours either by Blood Panel Multiplexed PCR or Direct    MALDI-TOF. Details: [email or web address]  Organism: Blood Culture PCR (17 Nov 2023 21:09)  Organism: Blood Culture PCR (17 Nov 2023 21:09)             [facility]  Division of Infectious Diseases  [phone]    Name: AIXA LARSON  Age: 63y  Gender: Male  MRN: [id number]    Interval History--  Notes reviewed. Seen earlier this AM. Feels much better after BKA.     Past Medical History--  Hypertension    Diabetes        For details regarding the patient's social history, family history, and other miscellaneous elements, please refer the initial infectious diseases consultation and/or the admitting history and physical examination for this admission.    Allergies    penicillin (Anaphylaxis)    Intolerances        Medications--  Antibiotics:  vancomycin  IVPB 1000 milliGRAM(s) IV Intermittent every 24 hours    Immunologic:  influenza   Vaccine 0.5 milliLiter(s) IntraMuscular once    Other:  acetaminophen     Tablet .. PRN  amLODIPine   Tablet  chlorhexidine 2% Cloths  dextrose 5%.  dextrose 5%.  dextrose 50% Injectable  dextrose 50% Injectable  dextrose 50% Injectable  dextrose Oral Gel PRN  glucagon  Injectable  HYDROmorphone PCA (1 mG/mL)  HYDROmorphone PCA (1 mG/mL) Rescue Clinician Bolus PRN  insulin lispro (ADMELOG) corrective regimen sliding scale  insulin lispro (ADMELOG) corrective regimen sliding scale  nalbuphine Injectable PRN  naloxone Injectable PRN  ondansetron Injectable PRN  ondansetron Injectable PRN  oxyCODONE    IR PRN  sodium chloride 0.45%      Review of Systems--  A 10-point review of systems was obtained.   Review of systems otherwise negative except as previously noted.    Physical Examination--  Vital Signs: T(F): 97.6 (11-20-23 @ 05:21), Max: 98.5 (11-19-23 @ 21:34)  HR: 86 (11-20-23 @ 05:21)  BP: 139/69 (11-20-23 @ 05:21)  RR: 17 (11-20-23 @ 05:21)  SpO2: 92% (11-20-23 @ 05:21)  Wt(kg): --  General: Nontoxic-appearing Male in no acute distress.  HEENT: Anicteric. Conjunctiva pink and moist. Oropharynx clear.  Neck: Not rigid. No sense of mass.  Nodes: None palpable.  Lungs: Diminished BS bilaterally without rales, wheezing or rhonchi  Heart: Regular rate and rhythm.   Abdomen: Bowel sounds present and normoactive. Soft. Nondistended. Nontender.   Extremities: RLE dressed. No C/C/E.   Psychiatric::  Grossly appropriate mood and affect. Appreciative      Laboratory Studies--  CBC                        10.1   13.42 )-----------( 216      ( 20 Nov 2023 07:59 )             31.3       Chemistries  11-20    133<L>  |  99  |  40<H>  ----------------------------<  130<H>  4.8   |  21<L>  |  3.72<H>    Ca    8.2<L>      20 Nov 2023 07:59  Phos  4.2     11-19  Mg     1.7     11-19        Culture Data    Culture - Blood (collected 16 Nov 2023 19:34)  Source: .Blood Blood-Peripheral  Gram Stain (18 Nov 2023 02:08):    Growth in anaerobic bottle: Gram Positive Cocci in Clusters    Growth in aerobic bottle: Gram Positive Cocci in Clusters  Final Report (18 Nov 2023 22:44):    Growth in aerobic and anaerobic bottles: Staphylococcus epidermidis    Culture - Blood (collected 16 Nov 2023 19:33)  Source: .Blood Blood-Peripheral  Gram Stain (18 Nov 2023 02:38):    Growth in aerobic bottle: Gram Positive Cocci in Clusters    Growth in anaerobic bottle: Gram Positive Cocci in Clusters  Final Report (18 Nov 2023 22:45):    Growth in aerobic and anaerobic bottles: Staphylococcus epidermidis    See previous culture [id number]    Culture - Blood (collected 15 Nov 2023 12:50)  Source: .Blood Blood-Peripheral  Gram Stain (16 Nov 2023 16:29):    Growth in anaerobic bottle: Gram Positive Cocci in Clusters    Growth in aerobic bottle: Gram Positive Cocci in Clusters  Final Report (18 Nov 2023 20:43):    Growth in aerobic and anaerobic bottles: Staphylococcus epidermidis  Organism: Staphylococcus epidermidis (18 Nov 2023 20:43)  Organism: Staphylococcus epidermidis (18 Nov 2023 20:43)    Culture - Blood (collected 15 Nov 2023 12:35)  Source: .Blood Blood-Peripheral  Gram Stain (16 Nov 2023 15:33):    Growth in aerobic bottle: Gram Positive Cocci in Clusters    Growth in anaerobic bottle: Gram Positive Cocci in Clusters  Final Report (17 Nov 2023 21:09):    Growth in aerobic and anaerobic bottles: Staphylococcus epidermidis    Direct identification is available within approximately 3-5    hours either by Blood Panel Multiplexed PCR or Direct    MALDI-TOF. Details: [email or web address]  Organism: Blood Culture PCR (17 Nov 2023 21:09)  Organism: Blood Culture PCR (17 Nov 2023 21:09)

## 2023-11-20 NOTE — PROGRESS NOTE ADULT - ASSESSMENT
64 y/o male with pmhx htn, dm, hld  presents to the ED sent in from rehab center for right foot discoloration and pain  Hyponatremia   Metabolic acidosis   Wedge-shaped hypoattenuating focus of the left kidney concerning for renal infarct or focus of  infection/pyelonephritis.  Distended bladder without hydro  OLIVER following contrast and now ATN   High grade bacteremia     1 Renal - creatinine trending higher, likely BHAKTI at present. Supportive care   Continue IVF to 0.45NS + 75mEq NaHCO3 at 75cc/hr   Eventual renal dopplers to assess possible infarct vs infection   Trend renal function   2 Vasc - SP kiel ESPARZA    3 ID -Vanco level in am prior to next dose   4 GI-Nutritional support     [facility]   [facility]   [phone]

## 2023-11-20 NOTE — PROGRESS NOTE ADULT - SUBJECTIVE AND OBJECTIVE BOX
SURGERY DAILY PROGRESS NOTE    SUBJECTIVE: Patient seen and examined on AM rounds. Reports his pain is well-controlled with the PCA but notes the leg and surgical site are quite tender.       OBJECTIVE:  Vital Signs Last 24 Hrs  T(C): 36.4 (20 Nov 2023 05:21), Max: 36.9 (19 Nov 2023 21:34)  T(F): 97.6 (20 Nov 2023 05:21), Max: 98.5 (19 Nov 2023 21:34)  HR: 86 (20 Nov 2023 05:21) (75 - 95)  BP: 139/69 (20 Nov 2023 05:21) (117/69 - 143/77)  BP(mean): 97 (19 Nov 2023 11:00) (89 - 97)  RR: 17 (20 Nov 2023 05:21) (12 - 18)  SpO2: 92% (20 Nov 2023 05:21) (92% - 98%)    Parameters below as of 20 Nov 2023 05:21  Patient On (Oxygen Delivery Method): room air        I&O's Summary    19 Nov 2023 07:01  -  20 Nov 2023 07:00  --------------------------------------------------------  IN: 2060 mL / OUT: 1085 mL / NET: 975 mL        Physical Exam:  General Appearance: Appears well, NAD  Chest: Nonlabored breathing on RA  CV: Hemodynamically stable  Extremities: s/p R BKA. Dressing C/D/I  Vascular: Both extremities WWP. Surgical site clean with very slow ooze, tissues viable appearing, no purulence noted. Clean WTD applied    LABS:                        10.1   13.42 )-----------( 216      ( 20 Nov 2023 07:59 )             31.3     11-20    133<L>  |  99  |  40<H>  ----------------------------<  130<H>  4.8   |  21<L>  |  3.72<H>    Ca    8.2<L>      20 Nov 2023 07:59  Phos  4.2     11-19  Mg     1.7     11-19      PTT - ( 19 Nov 2023 05:40 )  PTT:28.1 sec  Urinalysis Basic - ( 20 Nov 2023 07:59 )    Color: x / Appearance: x / SG: x / pH: x  Gluc: 130 mg/dL / Ketone: x  / Bili: x / Urobili: x   Blood: x / Protein: x / Nitrite: x   Leuk Esterase: x / RBC: x / WBC x   Sq Epi: x / Non Sq Epi: x / Bacteria: x

## 2023-11-21 LAB
ALBUMIN SERPL ELPH-MCNC: 2 G/DL — LOW (ref 3.3–5)
ALBUMIN SERPL ELPH-MCNC: 2 G/DL — LOW (ref 3.3–5)
ALP SERPL-CCNC: 97 U/L — SIGNIFICANT CHANGE UP (ref 40–120)
ALP SERPL-CCNC: 97 U/L — SIGNIFICANT CHANGE UP (ref 40–120)
ALT FLD-CCNC: 18 U/L — SIGNIFICANT CHANGE UP (ref 10–45)
ALT FLD-CCNC: 18 U/L — SIGNIFICANT CHANGE UP (ref 10–45)
ANION GAP SERPL CALC-SCNC: 10 MMOL/L — SIGNIFICANT CHANGE UP (ref 5–17)
ANION GAP SERPL CALC-SCNC: 10 MMOL/L — SIGNIFICANT CHANGE UP (ref 5–17)
AST SERPL-CCNC: 18 U/L — SIGNIFICANT CHANGE UP (ref 10–40)
AST SERPL-CCNC: 18 U/L — SIGNIFICANT CHANGE UP (ref 10–40)
BASOPHILS # BLD AUTO: 0.02 K/UL — SIGNIFICANT CHANGE UP (ref 0–0.2)
BASOPHILS # BLD AUTO: 0.02 K/UL — SIGNIFICANT CHANGE UP (ref 0–0.2)
BASOPHILS NFR BLD AUTO: 0.2 % — SIGNIFICANT CHANGE UP (ref 0–2)
BASOPHILS NFR BLD AUTO: 0.2 % — SIGNIFICANT CHANGE UP (ref 0–2)
BILIRUB SERPL-MCNC: 0.3 MG/DL — SIGNIFICANT CHANGE UP (ref 0.2–1.2)
BILIRUB SERPL-MCNC: 0.3 MG/DL — SIGNIFICANT CHANGE UP (ref 0.2–1.2)
BUN SERPL-MCNC: 31 MG/DL — HIGH (ref 7–23)
BUN SERPL-MCNC: 31 MG/DL — HIGH (ref 7–23)
CALCIUM SERPL-MCNC: 8.1 MG/DL — LOW (ref 8.4–10.5)
CALCIUM SERPL-MCNC: 8.1 MG/DL — LOW (ref 8.4–10.5)
CHLORIDE SERPL-SCNC: 101 MMOL/L — SIGNIFICANT CHANGE UP (ref 96–108)
CHLORIDE SERPL-SCNC: 101 MMOL/L — SIGNIFICANT CHANGE UP (ref 96–108)
CO2 SERPL-SCNC: 24 MMOL/L — SIGNIFICANT CHANGE UP (ref 22–31)
CO2 SERPL-SCNC: 24 MMOL/L — SIGNIFICANT CHANGE UP (ref 22–31)
CREAT SERPL-MCNC: 2.55 MG/DL — HIGH (ref 0.5–1.3)
CREAT SERPL-MCNC: 2.55 MG/DL — HIGH (ref 0.5–1.3)
EGFR: 27 ML/MIN/1.73M2 — LOW
EGFR: 27 ML/MIN/1.73M2 — LOW
EOSINOPHIL # BLD AUTO: 0.05 K/UL — SIGNIFICANT CHANGE UP (ref 0–0.5)
EOSINOPHIL # BLD AUTO: 0.05 K/UL — SIGNIFICANT CHANGE UP (ref 0–0.5)
EOSINOPHIL NFR BLD AUTO: 0.5 % — SIGNIFICANT CHANGE UP (ref 0–6)
EOSINOPHIL NFR BLD AUTO: 0.5 % — SIGNIFICANT CHANGE UP (ref 0–6)
GLUCOSE BLDC GLUCOMTR-MCNC: 176 MG/DL — HIGH (ref 70–99)
GLUCOSE BLDC GLUCOMTR-MCNC: 176 MG/DL — HIGH (ref 70–99)
GLUCOSE BLDC GLUCOMTR-MCNC: 208 MG/DL — HIGH (ref 70–99)
GLUCOSE BLDC GLUCOMTR-MCNC: 208 MG/DL — HIGH (ref 70–99)
GLUCOSE BLDC GLUCOMTR-MCNC: 270 MG/DL — HIGH (ref 70–99)
GLUCOSE BLDC GLUCOMTR-MCNC: 270 MG/DL — HIGH (ref 70–99)
GLUCOSE BLDC GLUCOMTR-MCNC: 289 MG/DL — HIGH (ref 70–99)
GLUCOSE BLDC GLUCOMTR-MCNC: 289 MG/DL — HIGH (ref 70–99)
GLUCOSE SERPL-MCNC: 196 MG/DL — HIGH (ref 70–99)
GLUCOSE SERPL-MCNC: 196 MG/DL — HIGH (ref 70–99)
HCT VFR BLD CALC: 30.5 % — LOW (ref 39–50)
HCT VFR BLD CALC: 30.5 % — LOW (ref 39–50)
HGB BLD-MCNC: 9.6 G/DL — LOW (ref 13–17)
HGB BLD-MCNC: 9.6 G/DL — LOW (ref 13–17)
IMM GRANULOCYTES NFR BLD AUTO: 1.1 % — HIGH (ref 0–0.9)
IMM GRANULOCYTES NFR BLD AUTO: 1.1 % — HIGH (ref 0–0.9)
LYMPHOCYTES # BLD AUTO: 1.47 K/UL — SIGNIFICANT CHANGE UP (ref 1–3.3)
LYMPHOCYTES # BLD AUTO: 1.47 K/UL — SIGNIFICANT CHANGE UP (ref 1–3.3)
LYMPHOCYTES # BLD AUTO: 13.3 % — SIGNIFICANT CHANGE UP (ref 13–44)
LYMPHOCYTES # BLD AUTO: 13.3 % — SIGNIFICANT CHANGE UP (ref 13–44)
MCHC RBC-ENTMCNC: 24.6 PG — LOW (ref 27–34)
MCHC RBC-ENTMCNC: 24.6 PG — LOW (ref 27–34)
MCHC RBC-ENTMCNC: 31.5 GM/DL — LOW (ref 32–36)
MCHC RBC-ENTMCNC: 31.5 GM/DL — LOW (ref 32–36)
MCV RBC AUTO: 78.2 FL — LOW (ref 80–100)
MCV RBC AUTO: 78.2 FL — LOW (ref 80–100)
MONOCYTES # BLD AUTO: 0.59 K/UL — SIGNIFICANT CHANGE UP (ref 0–0.9)
MONOCYTES # BLD AUTO: 0.59 K/UL — SIGNIFICANT CHANGE UP (ref 0–0.9)
MONOCYTES NFR BLD AUTO: 5.3 % — SIGNIFICANT CHANGE UP (ref 2–14)
MONOCYTES NFR BLD AUTO: 5.3 % — SIGNIFICANT CHANGE UP (ref 2–14)
NEUTROPHILS # BLD AUTO: 8.8 K/UL — HIGH (ref 1.8–7.4)
NEUTROPHILS # BLD AUTO: 8.8 K/UL — HIGH (ref 1.8–7.4)
NEUTROPHILS NFR BLD AUTO: 79.6 % — HIGH (ref 43–77)
NEUTROPHILS NFR BLD AUTO: 79.6 % — HIGH (ref 43–77)
NRBC # BLD: 0 /100 WBCS — SIGNIFICANT CHANGE UP (ref 0–0)
NRBC # BLD: 0 /100 WBCS — SIGNIFICANT CHANGE UP (ref 0–0)
PLATELET # BLD AUTO: 237 K/UL — SIGNIFICANT CHANGE UP (ref 150–400)
PLATELET # BLD AUTO: 237 K/UL — SIGNIFICANT CHANGE UP (ref 150–400)
POTASSIUM SERPL-MCNC: 4.7 MMOL/L — SIGNIFICANT CHANGE UP (ref 3.5–5.3)
POTASSIUM SERPL-MCNC: 4.7 MMOL/L — SIGNIFICANT CHANGE UP (ref 3.5–5.3)
POTASSIUM SERPL-SCNC: 4.7 MMOL/L — SIGNIFICANT CHANGE UP (ref 3.5–5.3)
POTASSIUM SERPL-SCNC: 4.7 MMOL/L — SIGNIFICANT CHANGE UP (ref 3.5–5.3)
PROT SERPL-MCNC: 5.6 G/DL — LOW (ref 6–8.3)
PROT SERPL-MCNC: 5.6 G/DL — LOW (ref 6–8.3)
RBC # BLD: 3.9 M/UL — LOW (ref 4.2–5.8)
RBC # BLD: 3.9 M/UL — LOW (ref 4.2–5.8)
RBC # FLD: 16.7 % — HIGH (ref 10.3–14.5)
RBC # FLD: 16.7 % — HIGH (ref 10.3–14.5)
SODIUM SERPL-SCNC: 135 MMOL/L — SIGNIFICANT CHANGE UP (ref 135–145)
SODIUM SERPL-SCNC: 135 MMOL/L — SIGNIFICANT CHANGE UP (ref 135–145)
VANCOMYCIN FLD-MCNC: 15 UG/ML — SIGNIFICANT CHANGE UP
VANCOMYCIN FLD-MCNC: 15 UG/ML — SIGNIFICANT CHANGE UP
WBC # BLD: 11.05 K/UL — HIGH (ref 3.8–10.5)
WBC # BLD: 11.05 K/UL — HIGH (ref 3.8–10.5)
WBC # FLD AUTO: 11.05 K/UL — HIGH (ref 3.8–10.5)
WBC # FLD AUTO: 11.05 K/UL — HIGH (ref 3.8–10.5)

## 2023-11-21 PROCEDURE — 93975 VASCULAR STUDY: CPT | Mod: 26

## 2023-11-21 PROCEDURE — 76870 US EXAM SCROTUM: CPT | Mod: 26

## 2023-11-21 RX ADMIN — CHLORHEXIDINE GLUCONATE 1 APPLICATION(S): 213 SOLUTION TOPICAL at 15:22

## 2023-11-21 RX ADMIN — HYDROMORPHONE HYDROCHLORIDE 0.5 MILLIGRAM(S): 2 INJECTION INTRAMUSCULAR; INTRAVENOUS; SUBCUTANEOUS at 08:44

## 2023-11-21 RX ADMIN — AMLODIPINE BESYLATE 5 MILLIGRAM(S): 2.5 TABLET ORAL at 06:17

## 2023-11-21 RX ADMIN — Medication 2: at 08:45

## 2023-11-21 RX ADMIN — SODIUM CHLORIDE 75 MILLILITER(S): 9 INJECTION, SOLUTION INTRAVENOUS at 06:18

## 2023-11-21 RX ADMIN — HYDROMORPHONE HYDROCHLORIDE 30 MILLILITER(S): 2 INJECTION INTRAMUSCULAR; INTRAVENOUS; SUBCUTANEOUS at 19:34

## 2023-11-21 RX ADMIN — Medication 3: at 17:32

## 2023-11-21 RX ADMIN — HYDROMORPHONE HYDROCHLORIDE 30 MILLILITER(S): 2 INJECTION INTRAMUSCULAR; INTRAVENOUS; SUBCUTANEOUS at 07:38

## 2023-11-21 RX ADMIN — Medication 250 MILLIGRAM(S): at 22:26

## 2023-11-21 RX ADMIN — Medication 3: at 15:19

## 2023-11-21 NOTE — PROGRESS NOTE ADULT - ASSESSMENT
63 M pmhx htn, dm, hld presents to ED from rehab for acute limb ischemia.    Acute Limb  Ischemia   CTA A/P with b/l LE run-noff significant for right external iliac artery occlusion with no reconstitution of flow to the distal RLE. 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       s/p BKA   Repeat Cx  bacteremia  Continue Vancomycin as per ID     Vascular Podiatry consulted     Wound Care consult appreciated   Cards eval appreciated   MARINO       OLIVER Contrats induced    Pacheco   Urine retention       DM HISS   A1C     HTN Home meds

## 2023-11-21 NOTE — PROGRESS NOTE ADULT - ASSESSMENT
Limb ischemia  bacteremia Gram positive   sepsis   OLIVER  sp BKA     empiric abx  has renal infarct  suspicion for IE  echo limited  will get MARINO  NPO p midnight   fu with ID and renal   fu with vascular surgery

## 2023-11-21 NOTE — PROGRESS NOTE ADULT - ASSESSMENT
62 y/o male with pmhx htn, dm, hld  presents to the ED sent in from rehab center for right foot discoloration and pain  Hyponatremia   Metabolic acidosis   Wedge-shaped hypoattenuating focus of the left kidney concerning for renal infarct or focus of  infection/pyelonephritis.  Distended bladder without hydro  OLIVER following contrast and now ATN   High grade bacteremia     1 Renal - creatinine trending higher, likely BHAKTI at present. Supportive care   dc  IVF to 0.45NS + 75mEq NaHCO3 at 75cc/hr   Eventual renal dopplers to assess possible infarct vs infection once pain is better   Trend renal function   2 Vasc - SP guillotine BKA and dressing changes as well   3 ID -Vanco dose was decreased   4 GI-Nutritional support     Sayed [facility]   [phone]

## 2023-11-21 NOTE — ADVANCED PRACTICE NURSE CONSULT - RECOMMEDATIONS
Recommendations  1) Sacral & b/l Ischials- cleanse w/ No Rinse Cleanser, apply Triad Hydrophilic dressing 2x/d & after each incontinence episode to protect area & promote wound healing.   2) Sween 24 to dry intact skin 2x/d to hydrate  3) When OOB to chair use of a waffle seat cushion to off load & limit sitting to 2 hour intervals.  4) Complete Cair off loading boots to off load heels & maintain proper positioning of the feet off of the footrest  5) Nutrition consult  6) Continue to turn & position & monitor skin  Discussed w/ pt & staff RN Sheila Mackay teach back from pt  Remain available as requested by staff

## 2023-11-21 NOTE — PROGRESS NOTE ADULT - SUBJECTIVE AND OBJECTIVE BOX
Subjective: Patient seen and examined. No new events except as noted.     SUBJECTIVE/ROS:  nad      MEDICATIONS:  MEDICATIONS  (STANDING):  amLODIPine   Tablet 5 milliGRAM(s) Oral daily  chlorhexidine 2% Cloths 1 Application(s) Topical daily  dextrose 5%. 1000 milliLiter(s) (50 mL/Hr) IV Continuous <Continuous>  dextrose 5%. 1000 milliLiter(s) (100 mL/Hr) IV Continuous <Continuous>  dextrose 50% Injectable 25 Gram(s) IV Push once  dextrose 50% Injectable 25 Gram(s) IV Push once  dextrose 50% Injectable 12.5 Gram(s) IV Push once  glucagon  Injectable 1 milliGRAM(s) IntraMuscular once  HYDROmorphone PCA (1 mG/mL) 30 milliLiter(s) PCA Continuous PCA Continuous  influenza   Vaccine 0.5 milliLiter(s) IntraMuscular once  insulin lispro (ADMELOG) corrective regimen sliding scale   SubCutaneous three times a day before meals  insulin lispro (ADMELOG) corrective regimen sliding scale   SubCutaneous at bedtime  sodium chloride 0.45% 1000 milliLiter(s) (75 mL/Hr) IV Continuous <Continuous>  vancomycin  IVPB 750 milliGRAM(s) IV Intermittent every 24 hours  vancomycin  IVPB          PHYSICAL EXAM:  T(C): 37.2 (11-21-23 @ 05:08), Max: 37.3 (11-20-23 @ 21:21)  HR: 84 (11-21-23 @ 05:08) (84 - 97)  BP: 135/88 (11-21-23 @ 05:08) (119/71 - 143/77)  RR: 18 (11-21-23 @ 05:08) (16 - 18)  SpO2: 92% (11-21-23 @ 05:08) (92% - 94%)  Wt(kg): --  I&O's Summary    20 Nov 2023 07:01  -  21 Nov 2023 07:00  --------------------------------------------------------  IN: 2035 mL / OUT: 1450 mL / NET: 585 mL            JVP: Normal  Neck: supple  Lung: clear   CV: S1 S2 , Murmur:  Abd: soft  Ext: No edema  neuro: Awake / alert  Psych: flat affect    LABS/DATA:    CARDIAC MARKERS:                                9.6    11.05 )-----------( 237      ( 21 Nov 2023 08:24 )             30.5     11-20    133<L>  |  99  |  40<H>  ----------------------------<  130<H>  4.8   |  21<L>  |  3.72<H>    Ca    8.2<L>      20 Nov 2023 07:59      proBNP:   Lipid Profile:   HgA1c:   TSH:     TELE:  EKG:

## 2023-11-21 NOTE — PROGRESS NOTE ADULT - SUBJECTIVE AND OBJECTIVE BOX
Patient is a 63y old  Male who presents with a chief complaint of Rt foot pain (19 Nov 2023 08:24)      SUBJECTIVE / OVERNIGHT EVENTS: no events   s/p BKA     T(C): 36.7 (11-21-23 @ 21:23), Max: 36.7 (11-21-23 @ 13:20)  HR: 97 (11-21-23 @ 21:23) (95 - 100)  BP: 160/81 (11-21-23 @ 21:23) (146/80 - 160/81)  RR: 18 (11-21-23 @ 21:23) (18 - 18)  SpO2: 94% (11-21-23 @ 21:23) (92% - 95%)    MEDICATIONS  (STANDING):  amLODIPine   Tablet 5 milliGRAM(s) Oral daily  chlorhexidine 2% Cloths 1 Application(s) Topical daily  dextrose 5%. 1000 milliLiter(s) (50 mL/Hr) IV Continuous <Continuous>  dextrose 5%. 1000 milliLiter(s) (100 mL/Hr) IV Continuous <Continuous>  dextrose 50% Injectable 25 Gram(s) IV Push once  dextrose 50% Injectable 25 Gram(s) IV Push once  dextrose 50% Injectable 12.5 Gram(s) IV Push once  glucagon  Injectable 1 milliGRAM(s) IntraMuscular once  HYDROmorphone PCA (1 mG/mL) 30 milliLiter(s) PCA Continuous PCA Continuous  influenza   Vaccine 0.5 milliLiter(s) IntraMuscular once  insulin lispro (ADMELOG) corrective regimen sliding scale   SubCutaneous three times a day before meals  insulin lispro (ADMELOG) corrective regimen sliding scale   SubCutaneous at bedtime  vancomycin  IVPB 750 milliGRAM(s) IV Intermittent every 24 hours  vancomycin  IVPB        MEDICATIONS  (PRN):  acetaminophen     Tablet .. 650 milliGRAM(s) Oral every 6 hours PRN Temp greater or equal to 38C (100.4F)  dextrose Oral Gel 15 Gram(s) Oral once PRN Blood Glucose LESS THAN 70 milliGRAM(s)/deciliter  HYDROmorphone PCA (1 mG/mL) Rescue Clinician Bolus 0.5 milliGRAM(s) IV Push every 15 minutes PRN for Pain Scale GREATER THAN 6  nalbuphine Injectable 2.5 milliGRAM(s) IV Push every 6 hours PRN Pruritus  naloxone Injectable 0.1 milliGRAM(s) IV Push every 3 minutes PRN For ANY of the following changes in patient status:  A. RR LESS THAN 10 breaths per minute, B. Oxygen saturation LESS THAN 90%, C. Sedation score of 6  ondansetron Injectable 4 milliGRAM(s) IV Push once PRN Nausea and/or Vomiting  ondansetron Injectable 4 milliGRAM(s) IV Push every 6 hours PRN Nausea  oxyCODONE    IR 5 milliGRAM(s) Oral every 6 hours PRN Severe Pain (7 - 10)    PHYSICAL EXAM:  GENERAL: NAD, well-developed  HEAD:  Atraumatic, Normocephalic  EYES: EOMI, PERRLA, conjunctiva and sclera clear  NECK: Supple, No JVD  CHEST/LUNG: Clear to auscultation bilaterally; No wheeze  HEART: Regular rate and rhythm; No murmurs, rubs, or gallops  ABDOMEN: Soft, Nontender, Nondistended; Bowel sounds present  EXTREMITIES:  s/p BKA   PSYCH: AAOx3  NEUROLOGY: non-focal  SKIN: No rashes or lesions                                          9.6    11.05 )-----------( 237      ( 21 Nov 2023 08:24 )             30.5           LIVER FUNCTIONS - ( 21 Nov 2023 08:24 )  Alb: 2.0 g/dL / Pro: 5.6 g/dL / ALK PHOS: 97 U/L / ALT: 18 U/L / AST: 18 U/L / GGT: x             135|101|31<196  4.7|24|2.55  8.1,--,--  11-21 @ 08:24  RADIOLOGY & ADDITIONAL TESTS:    Imaging Personally Reviewed:    Consultant(s) Notes Reviewed:      Care Discussed with Consultants/Other Providers:

## 2023-11-21 NOTE — PROGRESS NOTE ADULT - ASSESSMENT
63M PMHx HTN, DM, HLD, presented to ED from rehab for acute limb ischemia. Patient was discharged about 1 week ago from OSH to rehab for conservative medical treatment of foot infection and later brought to ED for increased pain and acute discoloration of foot. Does not weightbare on right leg and has not ambulated in months. Patient now s/p R kiel ESPARZA 11/19, recovering appropriately.     Recommendations:  - DVT ppx (does not need heparin drip)  - Pain control: PCA, appreciate acute pain recs  - Wound care: BID dressing changes  - Premedicate before dressing changes as needed  - Remainder of care per primary team    Vascular Surgery   [id number]

## 2023-11-21 NOTE — PROGRESS NOTE ADULT - SUBJECTIVE AND OBJECTIVE BOX
VASCULAR SURGERY DAILY PROGRESS NOTE:     SUBJECTIVE/ROS: Patient seen and examined on AM rounds. Reports his pain is well-controlled with the PCA .    MEDICATIONS  (STANDING):  amLODIPine   Tablet 5 milliGRAM(s) Oral daily  chlorhexidine 2% Cloths 1 Application(s) Topical daily  dextrose 5%. 1000 milliLiter(s) (50 mL/Hr) IV Continuous <Continuous>  dextrose 5%. 1000 milliLiter(s) (100 mL/Hr) IV Continuous <Continuous>  dextrose 50% Injectable 25 Gram(s) IV Push once  dextrose 50% Injectable 25 Gram(s) IV Push once  dextrose 50% Injectable 12.5 Gram(s) IV Push once  glucagon  Injectable 1 milliGRAM(s) IntraMuscular once  HYDROmorphone PCA (1 mG/mL) 30 milliLiter(s) PCA Continuous PCA Continuous  influenza   Vaccine 0.5 milliLiter(s) IntraMuscular once  insulin lispro (ADMELOG) corrective regimen sliding scale   SubCutaneous three times a day before meals  insulin lispro (ADMELOG) corrective regimen sliding scale   SubCutaneous at bedtime  sodium chloride 0.45% 1000 milliLiter(s) (75 mL/Hr) IV Continuous <Continuous>  vancomycin  IVPB      vancomycin  IVPB 750 milliGRAM(s) IV Intermittent every 24 hours    MEDICATIONS  (PRN):  acetaminophen     Tablet .. 650 milliGRAM(s) Oral every 6 hours PRN Temp greater or equal to 38C (100.4F)  dextrose Oral Gel 15 Gram(s) Oral once PRN Blood Glucose LESS THAN 70 milliGRAM(s)/deciliter  HYDROmorphone PCA (1 mG/mL) Rescue Clinician Bolus 0.5 milliGRAM(s) IV Push every 15 minutes PRN for Pain Scale GREATER THAN 6  nalbuphine Injectable 2.5 milliGRAM(s) IV Push every 6 hours PRN Pruritus  naloxone Injectable 0.1 milliGRAM(s) IV Push every 3 minutes PRN For ANY of the following changes in patient status:  A. RR LESS THAN 10 breaths per minute, B. Oxygen saturation LESS THAN 90%, C. Sedation score of 6  ondansetron Injectable 4 milliGRAM(s) IV Push every 6 hours PRN Nausea  ondansetron Injectable 4 milliGRAM(s) IV Push once PRN Nausea and/or Vomiting  oxyCODONE    IR 5 milliGRAM(s) Oral every 6 hours PRN Severe Pain (7 - 10)      OBJECTIVE:    Vital Signs Last 24 Hrs  T(C): 37.2 (21 Nov 2023 05:08), Max: 37.3 (20 Nov 2023 21:21)  T(F): 98.9 (21 Nov 2023 05:08), Max: 99.2 (20 Nov 2023 21:21)  HR: 84 (21 Nov 2023 05:08) (84 - 97)  BP: 135/88 (21 Nov 2023 05:08) (119/71 - 143/77)  BP(mean): --  RR: 18 (21 Nov 2023 05:08) (16 - 18)  SpO2: 92% (21 Nov 2023 05:08) (92% - 94%)    Parameters below as of 21 Nov 2023 05:08  Patient On (Oxygen Delivery Method): room air            I&O's Detail    20 Nov 2023 07:01  -  21 Nov 2023 07:00  --------------------------------------------------------  IN:    Oral Fluid: 460 mL    sodium chloride 0.45% w/ Additives: 1575 mL  Total IN: 2035 mL    OUT:    Intermittent Catheterization - Urethral (mL): 1450 mL  Total OUT: 1450 mL    Total NET: 585 mL          Daily     Daily     LABS:                        9.6    11.05 )-----------( 237      ( 21 Nov 2023 08:24 )             30.5     11-21    135  |  101  |  31<H>  ----------------------------<  196<H>  4.7   |  24  |  2.55<H>    Ca    8.1<L>      21 Nov 2023 08:24    TPro  5.6<L>  /  Alb  2.0<L>  /  TBili  0.3  /  DBili  x   /  AST  18  /  ALT  18  /  AlkPhos  97  11-21      Urinalysis Basic - ( 21 Nov 2023 08:24 )    Color: x / Appearance: x / SG: x / pH: x  Gluc: 196 mg/dL / Ketone: x  / Bili: x / Urobili: x   Blood: x / Protein: x / Nitrite: x   Leuk Esterase: x / RBC: x / WBC x   Sq Epi: x / Non Sq Epi: x / Bacteria: x                PHYSICAL EXAM:  General Appearance: Appears well, NAD  Chest: Nonlabored breathing on RA  CV: Hemodynamically stable  Extremities: s/p R BKA. Dressing C/D/I  Vascular: Both extremities WWP. Surgical site clean with very slow ooze, tissues viable appearing, no purulence noted. Clean WTD applied

## 2023-11-21 NOTE — PROGRESS NOTE ADULT - SUBJECTIVE AND OBJECTIVE BOX
Day 2 of Anesthesia Pain Management Service    SUBJECTIVE: I'm having some pain    Pain Scale Score:	[X] Refer to charted pain scores    THERAPY:    [ ] IV PCA Morphine		[ ] 5 mg/mL	[ ] 1 mg/mL  [X] IV PCA Hydromorphone	[ ] 5 mg/mL	[X] 1 mg/mL  [ ] IV PCA Fentanyl		[ ] 50 micrograms/mL    Demand dose: 0.2 mg     Lockout: 6 minutes   Continuous Rate: 0 mg/hr  4 Hour Limit: 4 mg    MEDICATIONS  (STANDING):  amLODIPine   Tablet 5 milliGRAM(s) Oral daily  chlorhexidine 2% Cloths 1 Application(s) Topical daily  dextrose 5%. 1000 milliLiter(s) (50 mL/Hr) IV Continuous <Continuous>  dextrose 5%. 1000 milliLiter(s) (100 mL/Hr) IV Continuous <Continuous>  dextrose 50% Injectable 25 Gram(s) IV Push once  dextrose 50% Injectable 25 Gram(s) IV Push once  dextrose 50% Injectable 12.5 Gram(s) IV Push once  glucagon  Injectable 1 milliGRAM(s) IntraMuscular once  HYDROmorphone PCA (1 mG/mL) 30 milliLiter(s) PCA Continuous PCA Continuous  influenza   Vaccine 0.5 milliLiter(s) IntraMuscular once  insulin lispro (ADMELOG) corrective regimen sliding scale   SubCutaneous three times a day before meals  insulin lispro (ADMELOG) corrective regimen sliding scale   SubCutaneous at bedtime  sodium chloride 0.45% 1000 milliLiter(s) (75 mL/Hr) IV Continuous <Continuous>  vancomycin  IVPB      vancomycin  IVPB 750 milliGRAM(s) IV Intermittent every 24 hours    MEDICATIONS  (PRN):  acetaminophen     Tablet .. 650 milliGRAM(s) Oral every 6 hours PRN Temp greater or equal to 38C (100.4F)  dextrose Oral Gel 15 Gram(s) Oral once PRN Blood Glucose LESS THAN 70 milliGRAM(s)/deciliter  HYDROmorphone PCA (1 mG/mL) Rescue Clinician Bolus 0.5 milliGRAM(s) IV Push every 15 minutes PRN for Pain Scale GREATER THAN 6  nalbuphine Injectable 2.5 milliGRAM(s) IV Push every 6 hours PRN Pruritus  naloxone Injectable 0.1 milliGRAM(s) IV Push every 3 minutes PRN For ANY of the following changes in patient status:  A. RR LESS THAN 10 breaths per minute, B. Oxygen saturation LESS THAN 90%, C. Sedation score of 6  ondansetron Injectable 4 milliGRAM(s) IV Push every 6 hours PRN Nausea  ondansetron Injectable 4 milliGRAM(s) IV Push once PRN Nausea and/or Vomiting  oxyCODONE    IR 5 milliGRAM(s) Oral every 6 hours PRN Severe Pain (7 - 10)      OBJECTIVE:    Sedation Score:	[ X] Alert 	[ ] Drowsy 	[ ] Arousable	[ ] Asleep	[ ] Unresponsive    Side Effects:	[X ] None	[ ] Nausea	[ ] Vomiting	[ ] Pruritus  		[ ] Other:    Vital Signs Last 24 Hrs  T(C): 36.7 (21 Nov 2023 09:34), Max: 37.3 (20 Nov 2023 21:21)  T(F): 98 (21 Nov 2023 09:34), Max: 99.2 (20 Nov 2023 21:21)  HR: 104 (21 Nov 2023 09:34) (84 - 104)  BP: 134/79 (21 Nov 2023 09:34) (119/71 - 138/72)  BP(mean): --  RR: 18 (21 Nov 2023 09:34) (16 - 18)  SpO2: 91% (21 Nov 2023 09:34) (91% - 94%)    Parameters below as of 21 Nov 2023 09:34  Patient On (Oxygen Delivery Method): room air        ASSESSMENT/ PLAN    Therapy to  be:               [X] Continued   [ ] Discontinued   [ ] Changed to PRN Analgesics    Documentation and Verification of current medications:   [X] Done	[ ] Not done, not eligible    Comments: Endorsing some pain in RLE. Total PCA use 4.8mg / 24 hours, using 0-1x/hr.   Consider adding gabapentin.  NPO after MN for MARINO. Encouraged increased PCA use.

## 2023-11-21 NOTE — PROGRESS NOTE ADULT - SUBJECTIVE AND OBJECTIVE BOX
NEPHROLOGY-[facility] [phone]        Patient seen and examined in bed.  He was the same         MEDICATIONS  (STANDING):  amLODIPine   Tablet 5 milliGRAM(s) Oral daily  chlorhexidine 2% Cloths 1 Application(s) Topical daily  dextrose 5%. 1000 milliLiter(s) (50 mL/Hr) IV Continuous <Continuous>  dextrose 5%. 1000 milliLiter(s) (100 mL/Hr) IV Continuous <Continuous>  dextrose 50% Injectable 12.5 Gram(s) IV Push once  dextrose 50% Injectable 25 Gram(s) IV Push once  dextrose 50% Injectable 25 Gram(s) IV Push once  glucagon  Injectable 1 milliGRAM(s) IntraMuscular once  HYDROmorphone PCA (1 mG/mL) 30 milliLiter(s) PCA Continuous PCA Continuous  influenza   Vaccine 0.5 milliLiter(s) IntraMuscular once  insulin lispro (ADMELOG) corrective regimen sliding scale   SubCutaneous at bedtime  insulin lispro (ADMELOG) corrective regimen sliding scale   SubCutaneous three times a day before meals  sodium chloride 0.45% 1000 milliLiter(s) (75 mL/Hr) IV Continuous <Continuous>  vancomycin  IVPB 750 milliGRAM(s) IV Intermittent every 24 hours  vancomycin  IVPB          VITAL:  T(C): , Max: 37.3 (11-20-23 @ 21:21)  T(F): , Max: 99.2 (11-20-23 @ 21:21)  HR: 104 (11-21-23 @ 09:34)  BP: 134/79 (11-21-23 @ 09:34)  BP(mean): --  RR: 18 (11-21-23 @ 09:34)  SpO2: 91% (11-21-23 @ 09:34)  Wt(kg): --    I and O's:    11-20 @ 07:01  -  11-21 @ 07:00  --------------------------------------------------------  IN: 2035 mL / OUT: 1450 mL / NET: 585 mL    11-21 @ 07:01  -  11-21 @ 12:06  --------------------------------------------------------  IN: 240 mL / OUT: 0 mL / NET: 240 mL          PHYSICAL EXAM:    Constitutional: NAD  Neck:  No JVD  Respiratory: CTAB/L  Cardiovascular: S1 and S2  Gastrointestinal: BS+, soft, NT/ND  Extremities: No peripheral edema + BKA   Neurological: A/O x 3, no focal deficits  Psychiatric: Normal mood, normal affect  : No Pacheco  Skin: No rashes  Access: Not applicable    LABS:                        9.6    11.05 )-----------( 237      ( 21 Nov 2023 08:24 )             30.5     11-21    135  |  101  |  31<H>  ----------------------------<  196<H>  4.7   |  24  |  2.55<H>    Ca    8.1<L>      21 Nov 2023 08:24    TPro  5.6<L>  /  Alb  2.0<L>  /  TBili  0.3  /  DBili  x   /  AST  18  /  ALT  18  /  AlkPhos  97  11-21          Urine Studies:  Urinalysis Basic - ( 21 Nov 2023 08:24 )    Color: x / Appearance: x / SG: x / pH: x  Gluc: 196 mg/dL / Ketone: x  / Bili: x / Urobili: x   Blood: x / Protein: x / Nitrite: x   Leuk Esterase: x / RBC: x / WBC x   Sq Epi: x / Non Sq Epi: x / Bacteria: x            RADIOLOGY & ADDITIONAL STUDIES:

## 2023-11-21 NOTE — ADVANCED PRACTICE NURSE CONSULT - ASSESSMENT
Pt is awake & alert, in bed on a low air loss & pressure redistribution surface. Pt is being turned & positioned as per nursing documentation. Pt w/ fair skin turgor & incontinent of bladder & bowels w/ dry skin noted on lower extremities. The following was noted:  Bilateral Ischials noted to have Deep Tissue Injuries which each measures 6cm(l) x 4cm(w) x 0cm(d) skin is intact without drainage or odor. Tissue is a deep dark discoloration  Sacral area Deep Tissue Injury in evolution noted. DTI measures 4cm(l) x 4cm(w) x 0.1cm(d). Center of area is open 2cm(l) x 2cm(w) x 0.1cm(d)   wound bed which is open is red granular & intact DTI is a deep purple color. There is scant serosang drainage without odor noted.

## 2023-11-21 NOTE — ADVANCED PRACTICE NURSE CONSULT - REASON FOR CONSULT
Requested by RN staff to evaluate skin for Deep tissue Pressure Injury. PMH is noted:   ·Reason for Admission	Rt foot pain  64 y/o male with pmhx htn, dm, hld  presents to the ED sent in from rehab center for right foot discoloration and pain  Hyponatremia   Metabolic acidosis   Wedge-shaped hypoattenuating focus of the left kidney concerning for renal infarct or focus of  infection/pyelonephritis.  Distended bladder without hydro  OLIVER following contrast and now ATN   High grade bacteremia

## 2023-11-22 LAB
ANION GAP SERPL CALC-SCNC: 9 MMOL/L — SIGNIFICANT CHANGE UP (ref 5–17)
ANION GAP SERPL CALC-SCNC: 9 MMOL/L — SIGNIFICANT CHANGE UP (ref 5–17)
BUN SERPL-MCNC: 14 MG/DL — SIGNIFICANT CHANGE UP (ref 7–23)
BUN SERPL-MCNC: 14 MG/DL — SIGNIFICANT CHANGE UP (ref 7–23)
CALCIUM SERPL-MCNC: 8.3 MG/DL — LOW (ref 8.4–10.5)
CALCIUM SERPL-MCNC: 8.3 MG/DL — LOW (ref 8.4–10.5)
CHLORIDE SERPL-SCNC: 102 MMOL/L — SIGNIFICANT CHANGE UP (ref 96–108)
CHLORIDE SERPL-SCNC: 102 MMOL/L — SIGNIFICANT CHANGE UP (ref 96–108)
CO2 SERPL-SCNC: 25 MMOL/L — SIGNIFICANT CHANGE UP (ref 22–31)
CO2 SERPL-SCNC: 25 MMOL/L — SIGNIFICANT CHANGE UP (ref 22–31)
CREAT SERPL-MCNC: 0.76 MG/DL — SIGNIFICANT CHANGE UP (ref 0.5–1.3)
CREAT SERPL-MCNC: 0.76 MG/DL — SIGNIFICANT CHANGE UP (ref 0.5–1.3)
EGFR: 100 ML/MIN/1.73M2 — SIGNIFICANT CHANGE UP
EGFR: 100 ML/MIN/1.73M2 — SIGNIFICANT CHANGE UP
GLUCOSE BLDC GLUCOMTR-MCNC: 209 MG/DL — HIGH (ref 70–99)
GLUCOSE BLDC GLUCOMTR-MCNC: 209 MG/DL — HIGH (ref 70–99)
GLUCOSE BLDC GLUCOMTR-MCNC: 236 MG/DL — HIGH (ref 70–99)
GLUCOSE BLDC GLUCOMTR-MCNC: 236 MG/DL — HIGH (ref 70–99)
GLUCOSE BLDC GLUCOMTR-MCNC: 271 MG/DL — HIGH (ref 70–99)
GLUCOSE BLDC GLUCOMTR-MCNC: 271 MG/DL — HIGH (ref 70–99)
GLUCOSE BLDC GLUCOMTR-MCNC: 286 MG/DL — HIGH (ref 70–99)
GLUCOSE BLDC GLUCOMTR-MCNC: 286 MG/DL — HIGH (ref 70–99)
GLUCOSE SERPL-MCNC: 201 MG/DL — HIGH (ref 70–99)
GLUCOSE SERPL-MCNC: 201 MG/DL — HIGH (ref 70–99)
HCT VFR BLD CALC: 32 % — LOW (ref 39–50)
HCT VFR BLD CALC: 32 % — LOW (ref 39–50)
HGB BLD-MCNC: 10 G/DL — LOW (ref 13–17)
HGB BLD-MCNC: 10 G/DL — LOW (ref 13–17)
MCHC RBC-ENTMCNC: 24.8 PG — LOW (ref 27–34)
MCHC RBC-ENTMCNC: 24.8 PG — LOW (ref 27–34)
MCHC RBC-ENTMCNC: 31.3 GM/DL — LOW (ref 32–36)
MCHC RBC-ENTMCNC: 31.3 GM/DL — LOW (ref 32–36)
MCV RBC AUTO: 79.2 FL — LOW (ref 80–100)
MCV RBC AUTO: 79.2 FL — LOW (ref 80–100)
NRBC # BLD: 0 /100 WBCS — SIGNIFICANT CHANGE UP (ref 0–0)
NRBC # BLD: 0 /100 WBCS — SIGNIFICANT CHANGE UP (ref 0–0)
PLATELET # BLD AUTO: 213 K/UL — SIGNIFICANT CHANGE UP (ref 150–400)
PLATELET # BLD AUTO: 213 K/UL — SIGNIFICANT CHANGE UP (ref 150–400)
POTASSIUM SERPL-MCNC: 4.2 MMOL/L — SIGNIFICANT CHANGE UP (ref 3.5–5.3)
POTASSIUM SERPL-MCNC: 4.2 MMOL/L — SIGNIFICANT CHANGE UP (ref 3.5–5.3)
POTASSIUM SERPL-SCNC: 4.2 MMOL/L — SIGNIFICANT CHANGE UP (ref 3.5–5.3)
POTASSIUM SERPL-SCNC: 4.2 MMOL/L — SIGNIFICANT CHANGE UP (ref 3.5–5.3)
RBC # BLD: 4.04 M/UL — LOW (ref 4.2–5.8)
RBC # BLD: 4.04 M/UL — LOW (ref 4.2–5.8)
RBC # FLD: 16.7 % — HIGH (ref 10.3–14.5)
RBC # FLD: 16.7 % — HIGH (ref 10.3–14.5)
SODIUM SERPL-SCNC: 136 MMOL/L — SIGNIFICANT CHANGE UP (ref 135–145)
SODIUM SERPL-SCNC: 136 MMOL/L — SIGNIFICANT CHANGE UP (ref 135–145)
SURGICAL PATHOLOGY STUDY: SIGNIFICANT CHANGE UP
SURGICAL PATHOLOGY STUDY: SIGNIFICANT CHANGE UP
WBC # BLD: 8.93 K/UL — SIGNIFICANT CHANGE UP (ref 3.8–10.5)
WBC # BLD: 8.93 K/UL — SIGNIFICANT CHANGE UP (ref 3.8–10.5)
WBC # FLD AUTO: 8.93 K/UL — SIGNIFICANT CHANGE UP (ref 3.8–10.5)
WBC # FLD AUTO: 8.93 K/UL — SIGNIFICANT CHANGE UP (ref 3.8–10.5)

## 2023-11-22 PROCEDURE — 93975 VASCULAR STUDY: CPT | Mod: 26

## 2023-11-22 PROCEDURE — 99233 SBSQ HOSP IP/OBS HIGH 50: CPT

## 2023-11-22 RX ORDER — ONDANSETRON 8 MG/1
4 TABLET, FILM COATED ORAL ONCE
Refills: 0 | Status: COMPLETED | OUTPATIENT
Start: 2023-11-22 | End: 2023-11-22

## 2023-11-22 RX ORDER — HYDROMORPHONE HYDROCHLORIDE 2 MG/ML
0.5 INJECTION INTRAMUSCULAR; INTRAVENOUS; SUBCUTANEOUS
Refills: 0 | Status: DISCONTINUED | OUTPATIENT
Start: 2023-11-22 | End: 2023-11-29

## 2023-11-22 RX ADMIN — Medication 1: at 22:09

## 2023-11-22 RX ADMIN — Medication 2: at 12:21

## 2023-11-22 RX ADMIN — CHLORHEXIDINE GLUCONATE 1 APPLICATION(S): 213 SOLUTION TOPICAL at 12:23

## 2023-11-22 RX ADMIN — HYDROMORPHONE HYDROCHLORIDE 30 MILLILITER(S): 2 INJECTION INTRAMUSCULAR; INTRAVENOUS; SUBCUTANEOUS at 07:44

## 2023-11-22 RX ADMIN — OXYCODONE HYDROCHLORIDE 5 MILLIGRAM(S): 5 TABLET ORAL at 12:21

## 2023-11-22 RX ADMIN — AMLODIPINE BESYLATE 5 MILLIGRAM(S): 2.5 TABLET ORAL at 05:09

## 2023-11-22 RX ADMIN — HYDROMORPHONE HYDROCHLORIDE 0.5 MILLIGRAM(S): 2 INJECTION INTRAMUSCULAR; INTRAVENOUS; SUBCUTANEOUS at 16:25

## 2023-11-22 RX ADMIN — OXYCODONE HYDROCHLORIDE 5 MILLIGRAM(S): 5 TABLET ORAL at 13:21

## 2023-11-22 RX ADMIN — Medication 250 MILLIGRAM(S): at 20:01

## 2023-11-22 RX ADMIN — Medication 3: at 18:03

## 2023-11-22 RX ADMIN — HYDROMORPHONE HYDROCHLORIDE 0.5 MILLIGRAM(S): 2 INJECTION INTRAMUSCULAR; INTRAVENOUS; SUBCUTANEOUS at 17:25

## 2023-11-22 NOTE — PROGRESS NOTE ADULT - ASSESSMENT
Limb ischemia  bacteremia Gram positive   sepsis   OLIVER  sp BKA     empiric abx  has renal infarct  suspicion for IE  echo limited  awaiting MARINO  fu with ID and renal   fu with vascular surgery

## 2023-11-22 NOTE — PROGRESS NOTE ADULT - ASSESSMENT
Suspect CNS in blood culture contaminant from single phlebotomy  LE changes do not look primarily infectious  Doubt cardioembolic disease in this setting (eg endocarditis)  Denies any symptoms PTA of infection  If there is infection in the foot, antibiotics will not get to the target given absent perfusion  Leukocytosis likely reactive to ischemia  Patient is in pain from ischemia  D/W patient, he's amenable now to talking with surgery again re: BKA    11/17: Still not entirely convinced this coagulase-negative staphylococcal bacteremia represents true infection, however no objection to continuing vancomycin as dosed by renal.  He has acute kidney injury likely secondary to contrast.  If this is a true bacteremia related to his ischemic limb, source control remains the key issue–which in this case is below-knee amputation.  At this point in time there is no ID contraindication to proposed surgery.  11/20: POD#1 kiel ESPARZA. Bacteremia, sustained with S. epidermidis hard to disregard but somewhat surprising.   11/22: ECHO suboptimal. 11/21 cx pending. Creatinine dropped sharply.     Suggestions--  Awaiting repeat Cx to document clearance of bacteremia  Continue Vancomycin  Intensive monitoring of vancomycin levels is required to monitor for toxicity.   Monitor levels and creatinine, renal following  Management of OLIVER per renal  MARINO pending, concur    I will be away 11/23, returning on 11/24. My colleagues with the ID service will be available for any issues in my absence and can be reached at [phone].    Ayaz Morales MD  Attending Physician  [facility]  Division of Infectious Diseases  [phone]

## 2023-11-22 NOTE — PROGRESS NOTE ADULT - SUBJECTIVE AND OBJECTIVE BOX
[facility]  Division of Infectious Diseases  [phone]    Name: AIXA LARSON  Age: 63y  Gender: Male  MRN: [id number]    Interval History--  Notes reviewed.     Past Medical History--  Hypertension    Diabetes        For details regarding the patient's social history, family history, and other miscellaneous elements, please refer the initial infectious diseases consultation and/or the admitting history and physical examination for this admission.    Allergies    penicillin (Anaphylaxis)    Intolerances        Medications--  Antibiotics:  vancomycin  IVPB 750 milliGRAM(s) IV Intermittent every 24 hours  vancomycin  IVPB        Immunologic:  influenza   Vaccine 0.5 milliLiter(s) IntraMuscular once    Other:  acetaminophen     Tablet .. PRN  amLODIPine   Tablet  chlorhexidine 2% Cloths  dextrose 5%.  dextrose 5%.  dextrose 50% Injectable  dextrose 50% Injectable  dextrose 50% Injectable  dextrose Oral Gel PRN  glucagon  Injectable  HYDROmorphone PCA (1 mG/mL)  HYDROmorphone PCA (1 mG/mL) Rescue Clinician Bolus PRN  insulin lispro (ADMELOG) corrective regimen sliding scale  insulin lispro (ADMELOG) corrective regimen sliding scale  nalbuphine Injectable PRN  naloxone Injectable PRN  ondansetron Injectable PRN  ondansetron Injectable PRN  oxyCODONE    IR PRN      Review of Systems--  A 10-point review of systems was obtained.     Pertinent positives and negatives--  Constitutional: No fevers. No Chills. No Rigors.   Cardiovascular: No chest pain. No palpitations.  Respiratory: No shortness of breath. No cough.  Gastrointestinal: No nausea or vomiting. No diarrhea or constipation.   Psychiatric: Pleasant. Appropriate affect.    Review of systems otherwise negative except as previously noted.    Physical Examination--  Vital Signs: T(F): 98 (11-22-23 @ 05:06), Max: 98.3 (11-22-23 @ 01:15)  HR: 93 (11-22-23 @ 05:06)  BP: 162/90 (11-22-23 @ 05:06)  RR: 18 (11-22-23 @ 05:06)  SpO2: 94% (11-22-23 @ 05:06)  Wt(kg): --  General: Nontoxic-appearing Male in no acute distress.  HEENT: AT/NC. PERRL. EOMI. Anicteric. Conjunctiva pink and moist. Oropharynx clear. Dentition fair.  Neck: Not rigid. No sense of mass.  Nodes: None palpable.  Lungs: Clear bilaterally without rales, wheezing or rhonchi  Heart: Regular rate and rhythm. No Murmur. No rub. No gallop. No palpable thrill.  Abdomen: Bowel sounds present and normoactive. Soft. Nondistended. Nontender. No sense of mass. No organomegaly.  Back: No spinal tenderness. No costovertebral angle tenderness.   Extremities: No cyanosis or clubbing. No edema.   Skin: Warm. Dry. Good turgor. No rash. No vasculitic stigmata.  Psychiatric: Appropriate affect and mood for situation.         Laboratory Studies--  CBC                        10.0   8.93  )-----------( 213      ( 22 Nov 2023 07:02 )             32.0       Chemistries  11-22    136  |  102  |  14  ----------------------------<  201<H>  4.2   |  25  |  0.76    Ca    8.3<L>      22 Nov 2023 07:01    TPro  5.6<L>  /  Alb  2.0<L>  /  TBili  0.3  /  DBili  x   /  AST  18  /  ALT  18  /  AlkPhos  97  11-21      Culture Data    Culture - Surgical Swab (collected 19 Nov 2023 16:13)  Source: .Surgical Swab Surgical Swab  Preliminary Report (20 Nov 2023 19:03):    No growth    Culture - Blood (collected 16 Nov 2023 19:34)  Source: .Blood Blood-Peripheral  Gram Stain (18 Nov 2023 02:08):    Growth in anaerobic bottle: Gram Positive Cocci in Clusters    Growth in aerobic bottle: Gram Positive Cocci in Clusters  Final Report (18 Nov 2023 22:44):    Growth in aerobic and anaerobic bottles: Staphylococcus epidermidis    Culture - Blood (collected 16 Nov 2023 19:33)  Source: .Blood Blood-Peripheral  Gram Stain (18 Nov 2023 02:38):    Growth in aerobic bottle: Gram Positive Cocci in Clusters    Growth in anaerobic bottle: Gram Positive Cocci in Clusters  Final Report (18 Nov 2023 22:45):    Growth in aerobic and anaerobic bottles: Staphylococcus epidermidis    See previous culture [id number]    Culture - Blood (collected 15 Nov 2023 12:50)  Source: .Blood Blood-Peripheral  Gram Stain (16 Nov 2023 16:29):    Growth in anaerobic bottle: Gram Positive Cocci in Clusters    Growth in aerobic bottle: Gram Positive Cocci in Clusters  Final Report (18 Nov 2023 20:43):    Growth in aerobic and anaerobic bottles: Staphylococcus epidermidis  Organism: Staphylococcus epidermidis (18 Nov 2023 20:43)  Organism: Staphylococcus epidermidis (18 Nov 2023 20:43)    Culture - Blood (collected 15 Nov 2023 12:35)  Source: .Blood Blood-Peripheral  Gram Stain (16 Nov 2023 15:33):    Growth in aerobic bottle: Gram Positive Cocci in Clusters    Growth in anaerobic bottle: Gram Positive Cocci in Clusters  Final Report (17 Nov 2023 21:09):    Growth in aerobic and anaerobic bottles: Staphylococcus epidermidis    Direct identification is available within approximately 3-5    hours either by Blood Panel Multiplexed PCR or Direct    MALDI-TOF. Details: [email or web address]  Organism: Blood Culture PCR (17 Nov 2023 21:09)  Organism: Blood Culture PCR (17 Nov 2023 21:09)             [facility]  Division of Infectious Diseases  [phone]    Name: AIXA LARSON  Age: 63y  Gender: Male  MRN: [id number]    Interval History--  Notes reviewed. Seen earlier this am. Not as positive in outlook as in prior visit. No specific complaints.       Past Medical History--  Hypertension    Diabetes        For details regarding the patient's social history, family history, and other miscellaneous elements, please refer the initial infectious diseases consultation and/or the admitting history and physical examination for this admission.    Allergies    penicillin (Anaphylaxis)    Intolerances        Medications--  Antibiotics:  vancomycin  IVPB 750 milliGRAM(s) IV Intermittent every 24 hours  vancomycin  IVPB        Immunologic:  influenza   Vaccine 0.5 milliLiter(s) IntraMuscular once    Other:  acetaminophen     Tablet .. PRN  amLODIPine   Tablet  chlorhexidine 2% Cloths  dextrose 5%.  dextrose 5%.  dextrose 50% Injectable  dextrose 50% Injectable  dextrose 50% Injectable  dextrose Oral Gel PRN  glucagon  Injectable  HYDROmorphone PCA (1 mG/mL)  HYDROmorphone PCA (1 mG/mL) Rescue Clinician Bolus PRN  insulin lispro (ADMELOG) corrective regimen sliding scale  insulin lispro (ADMELOG) corrective regimen sliding scale  nalbuphine Injectable PRN  naloxone Injectable PRN  ondansetron Injectable PRN  ondansetron Injectable PRN  oxyCODONE    IR PRN      Review of Systems--  A 10-point review of systems was obtained.   Review of systems otherwise negative except as previously noted.    Physical Examination--  Vital Signs: T(F): 98 (11-22-23 @ 05:06), Max: 98.3 (11-22-23 @ 01:15)  HR: 93 (11-22-23 @ 05:06)  BP: 162/90 (11-22-23 @ 05:06)  RR: 18 (11-22-23 @ 05:06)  SpO2: 94% (11-22-23 @ 05:06)  Wt(kg): --  General: Nontoxic-appearing Male in no acute distress.  HEENT: Anicteric. Conjunctiva pink and moist. Oropharynx clear.  Neck: Not rigid. No sense of mass.  Nodes: None palpable.  Lungs: Diminished BS bilaterally without rales, wheezing or rhonchi  Heart: Regular rate and rhythm.   Abdomen: Bowel sounds present and normoactive. Soft. Nondistended. Nontender.   Extremities: RLE dressed. No C/C/E.   Psychiatric::  Grossly appropriate mood and affect. Appreciative      Laboratory Studies--  CBC                        10.0   8.93  )-----------( 213      ( 22 Nov 2023 07:02 )             32.0       Chemistries  11-22    136  |  102  |  14  ----------------------------<  201<H>  4.2   |  25  |  0.76    Ca    8.3<L>      22 Nov 2023 07:01    TPro  5.6<L>  /  Alb  2.0<L>  /  TBili  0.3  /  DBili  x   /  AST  18  /  ALT  18  /  AlkPhos  97  11-21      Culture Data    Culture - Surgical Swab (collected 19 Nov 2023 16:13)  Source: .Surgical Swab Surgical Swab  Preliminary Report (20 Nov 2023 19:03):    No growth    Culture - Blood (collected 16 Nov 2023 19:34)  Source: .Blood Blood-Peripheral  Gram Stain (18 Nov 2023 02:08):    Growth in anaerobic bottle: Gram Positive Cocci in Clusters    Growth in aerobic bottle: Gram Positive Cocci in Clusters  Final Report (18 Nov 2023 22:44):    Growth in aerobic and anaerobic bottles: Staphylococcus epidermidis    Culture - Blood (collected 16 Nov 2023 19:33)  Source: .Blood Blood-Peripheral  Gram Stain (18 Nov 2023 02:38):    Growth in aerobic bottle: Gram Positive Cocci in Clusters    Growth in anaerobic bottle: Gram Positive Cocci in Clusters  Final Report (18 Nov 2023 22:45):    Growth in aerobic and anaerobic bottles: Staphylococcus epidermidis    See previous culture [id number]    Culture - Blood (collected 15 Nov 2023 12:50)  Source: .Blood Blood-Peripheral  Gram Stain (16 Nov 2023 16:29):    Growth in anaerobic bottle: Gram Positive Cocci in Clusters    Growth in aerobic bottle: Gram Positive Cocci in Clusters  Final Report (18 Nov 2023 20:43):    Growth in aerobic and anaerobic bottles: Staphylococcus epidermidis  Organism: Staphylococcus epidermidis (18 Nov 2023 20:43)  Organism: Staphylococcus epidermidis (18 Nov 2023 20:43)    Culture - Blood (collected 15 Nov 2023 12:35)  Source: .Blood Blood-Peripheral  Gram Stain (16 Nov 2023 15:33):    Growth in aerobic bottle: Gram Positive Cocci in Clusters    Growth in anaerobic bottle: Gram Positive Cocci in Clusters  Final Report (17 Nov 2023 21:09):    Growth in aerobic and anaerobic bottles: Staphylococcus epidermidis    Direct identification is available within approximately 3-5    hours either by Blood Panel Multiplexed PCR or Direct    MALDI-TOF. Details: [email or web address]  Organism: Blood Culture PCR (17 Nov 2023 21:09)  Organism: Blood Culture PCR (17 Nov 2023 21:09)

## 2023-11-22 NOTE — PROGRESS NOTE ADULT - SUBJECTIVE AND OBJECTIVE BOX
Day 3 of Anesthesia Pain Management Service    SUBJECTIVE: Having some pain    Pain Scale Score:	[X] Refer to charted pain scores    THERAPY: [X] IV PCA Hydromorphone 1 mg/mL  Demand dose: 0.2 mg     Lockout: 6 minutes   Continuous Rate: 0 mg/hr  4 Hour Limit: 4 mg    MEDICATIONS  (STANDING):  amLODIPine   Tablet 5 milliGRAM(s) Oral daily  chlorhexidine 2% Cloths 1 Application(s) Topical daily  dextrose 5%. 1000 milliLiter(s) (50 mL/Hr) IV Continuous <Continuous>  dextrose 5%. 1000 milliLiter(s) (100 mL/Hr) IV Continuous <Continuous>  dextrose 50% Injectable 25 Gram(s) IV Push once  dextrose 50% Injectable 25 Gram(s) IV Push once  dextrose 50% Injectable 12.5 Gram(s) IV Push once  glucagon  Injectable 1 milliGRAM(s) IntraMuscular once  HYDROmorphone PCA (1 mG/mL) 30 milliLiter(s) PCA Continuous PCA Continuous  influenza   Vaccine 0.5 milliLiter(s) IntraMuscular once  insulin lispro (ADMELOG) corrective regimen sliding scale   SubCutaneous three times a day before meals  insulin lispro (ADMELOG) corrective regimen sliding scale   SubCutaneous at bedtime  vancomycin  IVPB      vancomycin  IVPB 750 milliGRAM(s) IV Intermittent every 24 hours    MEDICATIONS  (PRN):  acetaminophen     Tablet .. 650 milliGRAM(s) Oral every 6 hours PRN Temp greater or equal to 38C (100.4F)  dextrose Oral Gel 15 Gram(s) Oral once PRN Blood Glucose LESS THAN 70 milliGRAM(s)/deciliter  HYDROmorphone PCA (1 mG/mL) Rescue Clinician Bolus 0.5 milliGRAM(s) IV Push every 15 minutes PRN for Pain Scale GREATER THAN 6  nalbuphine Injectable 2.5 milliGRAM(s) IV Push every 6 hours PRN Pruritus  naloxone Injectable 0.1 milliGRAM(s) IV Push every 3 minutes PRN For ANY of the following changes in patient status:  A. RR LESS THAN 10 breaths per minute, B. Oxygen saturation LESS THAN 90%, C. Sedation score of 6  ondansetron Injectable 4 milliGRAM(s) IV Push every 6 hours PRN Nausea  ondansetron Injectable 4 milliGRAM(s) IV Push once PRN Nausea and/or Vomiting  oxyCODONE    IR 5 milliGRAM(s) Oral every 6 hours PRN Severe Pain (7 - 10)      OBJECTIVE:    Sedation Score:	[ X] Alert	 [ ] Drowsy 	[ ] Arousable	[ ] Asleep	[ ] Unresponsive    Side Effects:	[X ] None	[ ] Nausea	[ ] Vomiting	[ ] Pruritus  		[ ] Other:    Vital Signs Last 24 Hrs  T(C): 36.3 (22 Nov 2023 09:18), Max: 36.8 (22 Nov 2023 01:15)  T(F): 97.3 (22 Nov 2023 09:18), Max: 98.3 (22 Nov 2023 01:15)  HR: 87 (22 Nov 2023 09:18) (87 - 100)  BP: 153/77 (22 Nov 2023 09:18) (146/80 - 162/90)  BP(mean): --  RR: 18 (22 Nov 2023 09:18) (18 - 18)  SpO2: 94% (22 Nov 2023 09:18) (92% - 95%)    Parameters below as of 22 Nov 2023 09:18  Patient On (Oxygen Delivery Method): room air        ASSESSMENT/ PLAN    Therapy to  be:               [  ] Continued   [X ] Discontinued   [ X] Changed to PRN Analgesics    Documentation and Verification of current medications:   [X] Done	[ ] Not done, not eligible    Comments: Endorsing some incisional pain not relieved with PCA. PCA use sporadic.  For MARINO this am. Plan to D\C PCA and transition to prn analgesics.

## 2023-11-22 NOTE — PROGRESS NOTE ADULT - ASSESSMENT
62 y/o male with pmhx htn, dm, hld  presents to the ED sent in from rehab center for right foot discoloration and pain  Hyponatremia   Metabolic acidosis   Wedge-shaped hypoattenuating focus of the left kidney concerning for renal infarct or focus of  infection/pyelonephritis.  High grade bacteremia     1 Renal - Resolved OLIVER;  Pacheco to gravity   Eventual renal dopplers ordered to assess possible infarct vs infection    2 Vasc - SP guillotine BKA and dressing changes as well   3 ID -Vanco dose was decreased   4 GI-Nutritional support   5 [facility] today     [facility]   [phone]      64 y/o male with pmhx htn, dm, hld  presents to the ED sent in from rehab center for right foot discoloration and pain  Hyponatremia   Metabolic acidosis   Wedge-shaped hypoattenuating focus of the left kidney concerning for renal infarct or focus of  infection/pyelonephritis.  High grade bacteremia     1 Renal - Resolved OLIVER;  Pacheco to gravity   Renal dopplers ordered to assess possible infarct vs infection    Scrotal sling at night for edema   2 Vasc - SP guillotine BKA and dressing changes as well   3 ID -Vanco dose was decreased   4 GI-Nutritional support   5 [facility] today     [facility]   [phone]

## 2023-11-22 NOTE — PROGRESS NOTE ADULT - SUBJECTIVE AND OBJECTIVE BOX
[facility] [phone]        Patient seen and examined in bed.  He was the same         MEDICATIONS  (STANDING):  amLODIPine   Tablet 5 milliGRAM(s) Oral daily  chlorhexidine 2% Cloths 1 Application(s) Topical daily  dextrose 5%. 1000 milliLiter(s) (50 mL/Hr) IV Continuous <Continuous>  dextrose 5%. 1000 milliLiter(s) (100 mL/Hr) IV Continuous <Continuous>  dextrose 50% Injectable 12.5 Gram(s) IV Push once  dextrose 50% Injectable 25 Gram(s) IV Push once  dextrose 50% Injectable 25 Gram(s) IV Push once  glucagon  Injectable 1 milliGRAM(s) IntraMuscular once  influenza   Vaccine 0.5 milliLiter(s) IntraMuscular once  insulin lispro (ADMELOG) corrective regimen sliding scale   SubCutaneous at bedtime  insulin lispro (ADMELOG) corrective regimen sliding scale   SubCutaneous three times a day before meals  vancomycin  IVPB      vancomycin  IVPB 750 milliGRAM(s) IV Intermittent every 24 hours      VITAL:  T(C): , Max: 36.8 (11-22-23 @ 01:15)  T(F): , Max: 98.3 (11-22-23 @ 01:15)  HR: 87 (11-22-23 @ 09:18)  BP: 153/77 (11-22-23 @ 09:18)  BP(mean): --  RR: 18 (11-22-23 @ 09:18)  SpO2: 94% (11-22-23 @ 09:18)  Wt(kg): --    I and O's:    11-21 @ 07:01  -  11-22 @ 07:00  --------------------------------------------------------  IN: 1140 mL / OUT: 4350 mL / NET: -3210 mL    11-22 @ 07:01  -  11-22 @ 10:18  --------------------------------------------------------  IN: 0 mL / OUT: 1350 mL / NET: -1350 mL          PHYSICAL EXAM:    Constitutional: NAD; cachetic   Neck:  No JVD  Respiratory: reduced   Cardiovascular: S1 and S2  Gastrointestinal: BS+, soft, NT/ND  Extremities: No peripheral edema + BKA   Neurological: A/O x 3, no focal deficits  Psychiatric: Normal mood, normal affect  : +  Pacheco  Skin: No rashes  Access: Not applicable    LABS:                        10.0   8.93  )-----------( 213      ( 22 Nov 2023 07:02 )             32.0     11-22    136  |  102  |  14  ----------------------------<  201<H>  4.2   |  25  |  0.76    Ca    8.3<L>      22 Nov 2023 07:01    TPro  5.6<L>  /  Alb  2.0<L>  /  TBili  0.3  /  DBili  x   /  AST  18  /  ALT  18  /  AlkPhos  97  11-21          Urine Studies:  Urinalysis Basic - ( 22 Nov 2023 07:01 )    Color: x / Appearance: x / SG: x / pH: x  Gluc: 201 mg/dL / Ketone: x  / Bili: x / Urobili: x   Blood: x / Protein: x / Nitrite: x   Leuk Esterase: x / RBC: x / WBC x   Sq Epi: x / Non Sq Epi: x / Bacteria: x            RADIOLOGY & ADDITIONAL STUDIES:

## 2023-11-22 NOTE — PROGRESS NOTE ADULT - SUBJECTIVE AND OBJECTIVE BOX
Subjective: Patient seen and examined. No new events except as noted.     SUBJECTIVE/ROS:  nad      MEDICATIONS:  MEDICATIONS  (STANDING):  amLODIPine   Tablet 5 milliGRAM(s) Oral daily  chlorhexidine 2% Cloths 1 Application(s) Topical daily  dextrose 5%. 1000 milliLiter(s) (50 mL/Hr) IV Continuous <Continuous>  dextrose 5%. 1000 milliLiter(s) (100 mL/Hr) IV Continuous <Continuous>  dextrose 50% Injectable 25 Gram(s) IV Push once  dextrose 50% Injectable 25 Gram(s) IV Push once  dextrose 50% Injectable 12.5 Gram(s) IV Push once  glucagon  Injectable 1 milliGRAM(s) IntraMuscular once  HYDROmorphone PCA (1 mG/mL) 30 milliLiter(s) PCA Continuous PCA Continuous  influenza   Vaccine 0.5 milliLiter(s) IntraMuscular once  insulin lispro (ADMELOG) corrective regimen sliding scale   SubCutaneous three times a day before meals  insulin lispro (ADMELOG) corrective regimen sliding scale   SubCutaneous at bedtime  vancomycin  IVPB 750 milliGRAM(s) IV Intermittent every 24 hours  vancomycin  IVPB          PHYSICAL EXAM:  T(C): 36.7 (11-22-23 @ 05:06), Max: 36.8 (11-22-23 @ 01:15)  HR: 93 (11-22-23 @ 05:06) (93 - 104)  BP: 162/90 (11-22-23 @ 05:06) (134/79 - 162/90)  RR: 18 (11-22-23 @ 05:06) (18 - 18)  SpO2: 94% (11-22-23 @ 05:06) (91% - 95%)  Wt(kg): --  I&O's Summary    21 Nov 2023 07:01  -  22 Nov 2023 07:00  --------------------------------------------------------  IN: 1140 mL / OUT: 4350 mL / NET: -3210 mL            JVP: Normal  Neck: supple  Lung: clear   CV: S1 S2 , Murmur:  Abd: soft  Ext: No edema  neuro: Awake / alert  Psych: flat affect    LABS/DATA:    CARDIAC MARKERS:                                10.0   8.93  )-----------( 213      ( 22 Nov 2023 07:02 )             32.0     11-22    136  |  102  |  14  ----------------------------<  201<H>  4.2   |  25  |  0.76    Ca    8.3<L>      22 Nov 2023 07:01    TPro  5.6<L>  /  Alb  2.0<L>  /  TBili  0.3  /  DBili  x   /  AST  18  /  ALT  18  /  AlkPhos  97  11-21    proBNP:   Lipid Profile:   HgA1c:   TSH:     TELE:  EKG:

## 2023-11-22 NOTE — CHART NOTE - NSCHARTNOTEFT_GEN_A_CORE
Nutrition Follow Up Note  Patient seen for: first follow-up for acute moderate malnutrition and consult for deep tissue injury     Chart reviewed, events noted.    Source: [x] Patient       [x] EMR        [x] RN        [] Family at bedside       [] Other:    -If unable to interview patient: [] Trach/Vent/BiPAP  [] Disoriented/confused/inappropriate to interview    Diet Order:   Diet, Consistent Carbohydrate/No Snacks:   No Concentrated Potassium  Supplement Feeding Modality:  Oral  Glucerna Shake Cans or Servings Per Day:  2       Frequency:  Daily (11-22-23)    - Is current order appropriate/adequate?  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   -- s/p IVF NaCl 0.45% from 11/19 -11/21, s/p MgSO4 on 11/19. Most recent lab on 11/22 revealed Ca 8.3L. Pt with OLIVER upon admission, renal indices WDL now.   -- GI: On Zofran PRN. Last BM ___.   Bowel Regimen?  [x] No    Weights:   Drug Dosing Weight  Height (cm): 170.2 (19 Nov 2023 08:56)  Weight (kg): 64.7 (19 Nov 2023 08:56)  BMI (kg/m2): 22.3 (19 Nov 2023 08:56)  Daily: none documented thus far   -- Wt loss expected s/p right BKA.   adjusted IBW for right BKA: 143lb    Nutritionally Pertinent MEDICATIONS  (STANDING):  amLODIPine   Tablet  dextrose 5%.  dextrose 5%.  dextrose 50% Injectable  dextrose 50% Injectable  dextrose 50% Injectable  glucagon  Injectable  insulin lispro (ADMELOG) corrective regimen sliding scale  insulin lispro (ADMELOG) corrective regimen sliding scale  vancomycin  IVPB  vancomycin  IVPB    Pertinent Labs: 11-22 @ 07:01: Na 136, BUN 14, Cr 0.76, <H>, K+ 4.2, Phos --, Mg --, Alk Phos --, ALT/SGPT --, AST/SGOT --, HbA1c --    A1C with Estimated Average Glucose Result: 8.8 % (11-18-23 @ 07:10)    Finger Sticks:  POCT Blood Glucose.: 209 mg/dL (11-22 @ 12:18)  POCT Blood Glucose.: 236 mg/dL (11-22 @ 06:47)  POCT Blood Glucose.: 176 mg/dL (11-21 @ 22:12)  POCT Blood Glucose.: 270 mg/dL (11-21 @ 17:29)  POCT Blood Glucose.: 289 mg/dL (11-21 @ 14:39)    Skin per wound care note 11/21: deep tissue injuries to bilateral Ischials and sacrum     Edema: edema +3 to scrotum per flowsheet 11/21    Estimated Needs:   [] no change since previous assessment  [] recalculated:     Previous Nutrition Diagnosis: acute moderate malnutrition, Increased nutrient needs   Nutrition Diagnosis is: [x] ongoing    New Nutrition Diagnosis: [] Not applicable    Nutrition Care Plan:  [x] In Progress    Nutrition Interventions:     Education Provided:       [] Yes:  [] No:        Recommendations:      -- Recommend to d/c NCK as potassium WDL. RD remains available to adjust diet as needed.   -- Continue supplements (Glucerna 2x daily for 440kcal, 20g proteins) to optimize calories and nutrients intake. Recommend Jerry 2x daily (180kcal, 5g proteins) to aid wound healing.   -- Recommend MVI and Vitamin C, pending no medical contraindications, for micronutrient support and to aid wound healing.   -- Monitor PO intake, GI tolerance, skin integrity, labs, weight, and bowel movement regularity.   -- Will continue to honor food and beverage preferences within diet restriction of patient in an effort to maximize level of nutrient intake.  -- Assist with meals PRN and encourage PO intake.    Monitoring and Evaluation:   Continue to monitor nutritional intake, tolerance to diet prescription, weights, labs, skin integrity    RD remains available upon request and will follow up per protocol  Hanh Lui, MS, RDN, CDN ([employer]) Nutrition Follow Up Note  Patient seen for: first follow-up for acute moderate malnutrition and consult for deep tissue injury     Chart reviewed, events noted.    Source: [x] Patient       [x] EMR           Diet Order:   Diet, Consistent Carbohydrate/No Snacks:   No Concentrated Potassium  Supplement Feeding Modality:  Oral  Glucerna Shake Cans or Servings Per Day:  2       Frequency:  Daily (11-22-23)    - Is current order appropriate/adequate?  [x]  No: see below for recommendation     - PO intake :   [x] >75%  Adequate    [x] 50-75%  Fair      -- Pt reports improving PO ~fair/good PO intake, observed >75% of foods completed from his lunch tray upon visit. Pt reports taking oral nutrition supplements provided, prefers to continue getting them.     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   -- s/p IVF NaCl 0.45% from 11/19 -11/21, s/p MgSO4 on 11/19. Most recent lab on 11/22 revealed Ca 8.3L. Pt with OLIVER upon admission, renal indices WDL now.   -- GI: On Zofran PRN. Pt reports constipation, last BM was 3 days ago per pt.  Bowel Regimen?  [x] No, will endorse finding to provider.     Weights:   Drug Dosing Weight  Height (cm): 170.2 (19 Nov 2023 08:56)  Weight (kg): 64.7 (19 Nov 2023 08:56)  BMI (kg/m2): 22.3 (19 Nov 2023 08:56)  Daily: none documented thus far   Wt obtained by RD (bed scale): 75kg (11/22), ? accuracy of bed scale wt as wt loss is expected s/p right BKA.   adjusted IBW for right BKA: 143lb    Nutritionally Pertinent MEDICATIONS  (STANDING):  amLODIPine   Tablet  dextrose 5%.  dextrose 5%.  dextrose 50% Injectable  dextrose 50% Injectable  dextrose 50% Injectable  glucagon  Injectable  insulin lispro (ADMELOG) corrective regimen sliding scale  insulin lispro (ADMELOG) corrective regimen sliding scale  vancomycin  IVPB  vancomycin  IVPB    Pertinent Labs: 11-22 @ 07:01: Na 136, BUN 14, Cr 0.76, <H>, K+ 4.2, Phos --, Mg --, Alk Phos --, ALT/SGPT --, AST/SGOT --, HbA1c --    A1C with Estimated Average Glucose Result: 8.8 % (11-18-23 @ 07:10)    Finger Sticks:  POCT Blood Glucose.: 209 mg/dL (11-22 @ 12:18)  POCT Blood Glucose.: 236 mg/dL (11-22 @ 06:47)  POCT Blood Glucose.: 176 mg/dL (11-21 @ 22:12)  POCT Blood Glucose.: 270 mg/dL (11-21 @ 17:29)  POCT Blood Glucose.: 289 mg/dL (11-21 @ 14:39)    Skin per wound care note 11/21: deep tissue injuries to bilateral Ischials and sacrum     Edema: edema +3 to scrotum per flowsheet 11/21    Estimated Needs based on adjusted IBW of 143lb/65kg:   [x] recalculated:   Calories: 1950-2275kcal (30-35 kcal/kg BW)   Proteins: 78-98g (1.2-1.5 g/kg BW)   Fluids: defer to team     Previous Nutrition Diagnosis: acute moderate malnutrition, Increased nutrient needs   Nutrition Diagnosis is: [x] ongoing    New Nutrition Diagnosis: [x] Not applicable    Nutrition Care Plan:  [x] In Progress    Nutrition Interventions:     Education Provided:       [x] Yes: Encouraged PO intake as tolerated with emphasis on protein foods as tolerated. Educated pt on foods rich in protein and encouraged consumption as able.        Recommendations:      -- Recommend to d/c NCK if possible as potassium WDL. RD remains available to adjust diet as needed.   -- Continue supplements (Glucerna 2x daily for 440kcal, 20g proteins) to optimize calories and nutrients intake. Recommend Jerry 2x daily (180kcal, 5g proteins) to aid wound healing.   -- Recommend MVI and Vitamin C, pending no medical contraindications, for micronutrient support and to aid wound healing.   -- Monitor PO intake, GI tolerance, skin integrity, labs, weight, and bowel movement regularity.   -- Will continue to honor food and beverage preferences within diet restriction of patient in an effort to maximize level of nutrient intake.  -- Assist with meals PRN and encourage PO intake.    Monitoring and Evaluation:   Continue to monitor nutritional intake, tolerance to diet prescription, weights, labs, skin integrity    RD remains available upon request and will follow up per protocol  Hanh Lui MS, RDN, CDN (Teams)

## 2023-11-23 LAB
ANION GAP SERPL CALC-SCNC: 7 MMOL/L — SIGNIFICANT CHANGE UP (ref 5–17)
ANION GAP SERPL CALC-SCNC: 7 MMOL/L — SIGNIFICANT CHANGE UP (ref 5–17)
BUN SERPL-MCNC: 8 MG/DL — SIGNIFICANT CHANGE UP (ref 7–23)
BUN SERPL-MCNC: 8 MG/DL — SIGNIFICANT CHANGE UP (ref 7–23)
CALCIUM SERPL-MCNC: 7.7 MG/DL — LOW (ref 8.4–10.5)
CALCIUM SERPL-MCNC: 7.7 MG/DL — LOW (ref 8.4–10.5)
CHLORIDE SERPL-SCNC: 103 MMOL/L — SIGNIFICANT CHANGE UP (ref 96–108)
CHLORIDE SERPL-SCNC: 103 MMOL/L — SIGNIFICANT CHANGE UP (ref 96–108)
CO2 SERPL-SCNC: 25 MMOL/L — SIGNIFICANT CHANGE UP (ref 22–31)
CO2 SERPL-SCNC: 25 MMOL/L — SIGNIFICANT CHANGE UP (ref 22–31)
CREAT SERPL-MCNC: 0.5 MG/DL — SIGNIFICANT CHANGE UP (ref 0.5–1.3)
CREAT SERPL-MCNC: 0.5 MG/DL — SIGNIFICANT CHANGE UP (ref 0.5–1.3)
EGFR: 114 ML/MIN/1.73M2 — SIGNIFICANT CHANGE UP
EGFR: 114 ML/MIN/1.73M2 — SIGNIFICANT CHANGE UP
GLUCOSE BLDC GLUCOMTR-MCNC: 213 MG/DL — HIGH (ref 70–99)
GLUCOSE BLDC GLUCOMTR-MCNC: 213 MG/DL — HIGH (ref 70–99)
GLUCOSE BLDC GLUCOMTR-MCNC: 246 MG/DL — HIGH (ref 70–99)
GLUCOSE BLDC GLUCOMTR-MCNC: 246 MG/DL — HIGH (ref 70–99)
GLUCOSE BLDC GLUCOMTR-MCNC: 295 MG/DL — HIGH (ref 70–99)
GLUCOSE BLDC GLUCOMTR-MCNC: 295 MG/DL — HIGH (ref 70–99)
GLUCOSE BLDC GLUCOMTR-MCNC: 353 MG/DL — HIGH (ref 70–99)
GLUCOSE BLDC GLUCOMTR-MCNC: 353 MG/DL — HIGH (ref 70–99)
GLUCOSE SERPL-MCNC: 189 MG/DL — HIGH (ref 70–99)
GLUCOSE SERPL-MCNC: 189 MG/DL — HIGH (ref 70–99)
HCT VFR BLD CALC: 27.1 % — LOW (ref 39–50)
HCT VFR BLD CALC: 27.1 % — LOW (ref 39–50)
HGB BLD-MCNC: 8.5 G/DL — LOW (ref 13–17)
HGB BLD-MCNC: 8.5 G/DL — LOW (ref 13–17)
MAGNESIUM SERPL-MCNC: 1.7 MG/DL — SIGNIFICANT CHANGE UP (ref 1.6–2.6)
MAGNESIUM SERPL-MCNC: 1.7 MG/DL — SIGNIFICANT CHANGE UP (ref 1.6–2.6)
MCHC RBC-ENTMCNC: 24.7 PG — LOW (ref 27–34)
MCHC RBC-ENTMCNC: 24.7 PG — LOW (ref 27–34)
MCHC RBC-ENTMCNC: 31.4 GM/DL — LOW (ref 32–36)
MCHC RBC-ENTMCNC: 31.4 GM/DL — LOW (ref 32–36)
MCV RBC AUTO: 78.8 FL — LOW (ref 80–100)
MCV RBC AUTO: 78.8 FL — LOW (ref 80–100)
NRBC # BLD: 0 /100 WBCS — SIGNIFICANT CHANGE UP (ref 0–0)
NRBC # BLD: 0 /100 WBCS — SIGNIFICANT CHANGE UP (ref 0–0)
PHOSPHATE SERPL-MCNC: 1.9 MG/DL — LOW (ref 2.5–4.5)
PHOSPHATE SERPL-MCNC: 1.9 MG/DL — LOW (ref 2.5–4.5)
PLATELET # BLD AUTO: 195 K/UL — SIGNIFICANT CHANGE UP (ref 150–400)
PLATELET # BLD AUTO: 195 K/UL — SIGNIFICANT CHANGE UP (ref 150–400)
POTASSIUM SERPL-MCNC: 3.8 MMOL/L — SIGNIFICANT CHANGE UP (ref 3.5–5.3)
POTASSIUM SERPL-MCNC: 3.8 MMOL/L — SIGNIFICANT CHANGE UP (ref 3.5–5.3)
POTASSIUM SERPL-SCNC: 3.8 MMOL/L — SIGNIFICANT CHANGE UP (ref 3.5–5.3)
POTASSIUM SERPL-SCNC: 3.8 MMOL/L — SIGNIFICANT CHANGE UP (ref 3.5–5.3)
RBC # BLD: 3.44 M/UL — LOW (ref 4.2–5.8)
RBC # BLD: 3.44 M/UL — LOW (ref 4.2–5.8)
RBC # FLD: 16.9 % — HIGH (ref 10.3–14.5)
RBC # FLD: 16.9 % — HIGH (ref 10.3–14.5)
SODIUM SERPL-SCNC: 135 MMOL/L — SIGNIFICANT CHANGE UP (ref 135–145)
SODIUM SERPL-SCNC: 135 MMOL/L — SIGNIFICANT CHANGE UP (ref 135–145)
VANCOMYCIN TROUGH SERPL-MCNC: 4.3 UG/ML — LOW (ref 10–20)
VANCOMYCIN TROUGH SERPL-MCNC: 4.3 UG/ML — LOW (ref 10–20)
WBC # BLD: 8.87 K/UL — SIGNIFICANT CHANGE UP (ref 3.8–10.5)
WBC # BLD: 8.87 K/UL — SIGNIFICANT CHANGE UP (ref 3.8–10.5)
WBC # FLD AUTO: 8.87 K/UL — SIGNIFICANT CHANGE UP (ref 3.8–10.5)
WBC # FLD AUTO: 8.87 K/UL — SIGNIFICANT CHANGE UP (ref 3.8–10.5)

## 2023-11-23 RX ORDER — VANCOMYCIN HCL 1 G
1250 VIAL (EA) INTRAVENOUS EVERY 12 HOURS
Refills: 0 | Status: DISCONTINUED | OUTPATIENT
Start: 2023-11-24 | End: 2023-11-24

## 2023-11-23 RX ORDER — VANCOMYCIN HCL 1 G
VIAL (EA) INTRAVENOUS
Refills: 0 | Status: DISCONTINUED | OUTPATIENT
Start: 2023-11-23 | End: 2023-11-24

## 2023-11-23 RX ORDER — VANCOMYCIN HCL 1 G
1250 VIAL (EA) INTRAVENOUS ONCE
Refills: 0 | Status: COMPLETED | OUTPATIENT
Start: 2023-11-23 | End: 2023-11-23

## 2023-11-23 RX ADMIN — AMLODIPINE BESYLATE 5 MILLIGRAM(S): 2.5 TABLET ORAL at 05:21

## 2023-11-23 RX ADMIN — CHLORHEXIDINE GLUCONATE 1 APPLICATION(S): 213 SOLUTION TOPICAL at 12:34

## 2023-11-23 RX ADMIN — OXYCODONE HYDROCHLORIDE 5 MILLIGRAM(S): 5 TABLET ORAL at 21:04

## 2023-11-23 RX ADMIN — Medication 5: at 18:02

## 2023-11-23 RX ADMIN — OXYCODONE HYDROCHLORIDE 5 MILLIGRAM(S): 5 TABLET ORAL at 13:30

## 2023-11-23 RX ADMIN — OXYCODONE HYDROCHLORIDE 5 MILLIGRAM(S): 5 TABLET ORAL at 12:33

## 2023-11-23 RX ADMIN — Medication 1: at 09:32

## 2023-11-23 RX ADMIN — Medication 166.67 MILLIGRAM(S): at 22:59

## 2023-11-23 RX ADMIN — HYDROMORPHONE HYDROCHLORIDE 0.5 MILLIGRAM(S): 2 INJECTION INTRAMUSCULAR; INTRAVENOUS; SUBCUTANEOUS at 08:50

## 2023-11-23 RX ADMIN — OXYCODONE HYDROCHLORIDE 5 MILLIGRAM(S): 5 TABLET ORAL at 20:04

## 2023-11-23 RX ADMIN — HYDROMORPHONE HYDROCHLORIDE 0.5 MILLIGRAM(S): 2 INJECTION INTRAMUSCULAR; INTRAVENOUS; SUBCUTANEOUS at 07:49

## 2023-11-23 RX ADMIN — Medication 3: at 12:34

## 2023-11-23 NOTE — PROGRESS NOTE ADULT - ASSESSMENT
63 M pmhx htn, dm, hld presents to ED from rehab for acute limb ischemia.    Acute Limb  Ischemia   CTA A/P with b/l LE run-noff significant for right external iliac artery occlusion with no reconstitution of flow to the distal RLE. 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       s/p BKA   Repeat Cx  bacteremia  Continue Vancomycin as per ID     Vascular Podiatry consulted     Wound Care consult appreciated   Cards eval appreciated     OLIVER Contrats induced    [place]   Urine retention       DM HISS   A1C     HTN Home meds        63 M pmhx htn, dm, hld presents to ED from rehab for acute limb ischemia.    Acute Limb  Ischemia   CTA A/P with b/l LE run-noff significant for right external iliac artery occlusion with no reconstitution of flow to the distal RLE. 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       s/p BKA   Repeat Cx  bacteremia  Continue Vancomycin as per ID     Vascular Podiatry consulted     Wound Care consult appreciated   Cards eval appreciated     OLIVER Contrats induced    [place]   Urine retention       DM HISS   A1C     HTN Home meds   Planning for formalization next week

## 2023-11-23 NOTE — PROGRESS NOTE ADULT - SUBJECTIVE AND OBJECTIVE BOX
Patient is a 63y old  Male who presents with a chief complaint of Rt foot pain (19 Nov 2023 08:24)      SUBJECTIVE / OVERNIGHT EVENTS: no events   s/p BKA         T(C): 36.9 (11-23-23 @ 21:17), Max: 37.2 (11-23-23 @ 17:05)  HR: 88 (11-23-23 @ 21:17) (88 - 100)  BP: 160/88 (11-23-23 @ 21:17) (145/89 - 160/88)  RR: 18 (11-23-23 @ 21:17) (18 - 18)  SpO2: 97% (11-23-23 @ 21:17) (94% - 97%)      MEDICATIONS  (STANDING):  amLODIPine   Tablet 5 milliGRAM(s) Oral daily  chlorhexidine 2% Cloths 1 Application(s) Topical daily  dextrose 5%. 1000 milliLiter(s) (50 mL/Hr) IV Continuous <Continuous>  dextrose 5%. 1000 milliLiter(s) (100 mL/Hr) IV Continuous <Continuous>  dextrose 50% Injectable 12.5 Gram(s) IV Push once  dextrose 50% Injectable 25 Gram(s) IV Push once  dextrose 50% Injectable 25 Gram(s) IV Push once  glucagon  Injectable 1 milliGRAM(s) IntraMuscular once  influenza   Vaccine 0.5 milliLiter(s) IntraMuscular once  insulin lispro (ADMELOG) corrective regimen sliding scale   SubCutaneous at bedtime  insulin lispro (ADMELOG) corrective regimen sliding scale   SubCutaneous three times a day before meals  vancomycin  IVPB      vancomycin  IVPB 750 milliGRAM(s) IV Intermittent every 24 hours    MEDICATIONS  (PRN):  acetaminophen     Tablet .. 650 milliGRAM(s) Oral every 6 hours PRN Temp greater or equal to 38C (100.4F)  dextrose Oral Gel 15 Gram(s) Oral once PRN Blood Glucose LESS THAN 70 milliGRAM(s)/deciliter  HYDROmorphone  Injectable 0.5 milliGRAM(s) IV Push every 3 hours PRN Moderate Pain (4 - 6)  ondansetron Injectable 4 milliGRAM(s) IV Push once PRN Nausea and/or Vomiting  oxyCODONE    IR 5 milliGRAM(s) Oral every 6 hours PRN Severe Pain (7 - 10)    PHYSICAL EXAM:  GENERAL: NAD, well-developed  HEAD:  Atraumatic, Normocephalic  EYES: EOMI, PERRLA, conjunctiva and sclera clear  NECK: Supple, No JVD  CHEST/LUNG: Clear to auscultation bilaterally; No wheeze  HEART: Regular rate and rhythm; No murmurs, rubs, or gallops  ABDOMEN: Soft, Nontender, Nondistended; Bowel sounds present  EXTREMITIES:  s/p BKA   PSYCH: AAOx3  NEUROLOGY: non-focal  SKIN: No rashes or lesions                                          8.5    8.87  )-----------( 195      ( 23 Nov 2023 07:17 )             27.1               135|103|8<189  3.8|25|0.50  7.7,1.7,1.9  11-23 @ 07:17  RADIOLOGY & ADDITIONAL TESTS:    Imaging Personally Reviewed:    Consultant(s) Notes Reviewed:      Care Discussed with Consultants/Other Providers:

## 2023-11-23 NOTE — PROGRESS NOTE ADULT - SUBJECTIVE AND OBJECTIVE BOX
Vascular Surgery Progress Note  Patient is a 63y old  Male who presents with a chief complaint of Rt foot pain (23 Nov 2023 10:32)      INTERVAL EVENTS: No acute events overnight.  SUBJECTIVE: Patient seen and examined at bedside with surgical team, patient without complaints. + pain at operative site        OBJECTIVE:    Vital Signs Last 24 Hrs  T(C): 36.8 (23 Nov 2023 09:05), Max: 36.8 (23 Nov 2023 09:05)  T(F): 98.2 (23 Nov 2023 09:05), Max: 98.2 (23 Nov 2023 09:05)  HR: 88 (23 Nov 2023 09:05) (81 - 94)  BP: 138/72 (23 Nov 2023 09:05) (138/72 - 167/91)  BP(mean): --  RR: 18 (23 Nov 2023 09:05) (18 - 19)  SpO2: 99% (23 Nov 2023 09:05) (93% - 99%)    Parameters below as of 23 Nov 2023 09:05  Patient On (Oxygen Delivery Method): room air    I&O's Detail    22 Nov 2023 07:01  -  23 Nov 2023 07:00  --------------------------------------------------------  IN:    Oral Fluid: 300 mL  Total IN: 300 mL    OUT:    Indwelling Catheter - Urethral (mL): 4330 mL  Total OUT: 4330 mL    Total NET: -4030 mL      23 Nov 2023 07:01  -  23 Nov 2023 13:04  --------------------------------------------------------  IN:    Oral Fluid: 240 mL  Total IN: 240 mL    OUT:    Indwelling Catheter - Urethral (mL): 500 mL  Total OUT: 500 mL    Total NET: -260 mL      MEDICATIONS  (STANDING):  amLODIPine   Tablet 5 milliGRAM(s) Oral daily  chlorhexidine 2% Cloths 1 Application(s) Topical daily  dextrose 5%. 1000 milliLiter(s) (50 mL/Hr) IV Continuous <Continuous>  dextrose 5%. 1000 milliLiter(s) (100 mL/Hr) IV Continuous <Continuous>  dextrose 50% Injectable 12.5 Gram(s) IV Push once  dextrose 50% Injectable 25 Gram(s) IV Push once  dextrose 50% Injectable 25 Gram(s) IV Push once  glucagon  Injectable 1 milliGRAM(s) IntraMuscular once  influenza   Vaccine 0.5 milliLiter(s) IntraMuscular once  insulin lispro (ADMELOG) corrective regimen sliding scale   SubCutaneous at bedtime  insulin lispro (ADMELOG) corrective regimen sliding scale   SubCutaneous three times a day before meals  vancomycin  IVPB      vancomycin  IVPB 750 milliGRAM(s) IV Intermittent every 24 hours    MEDICATIONS  (PRN):  acetaminophen     Tablet .. 650 milliGRAM(s) Oral every 6 hours PRN Temp greater or equal to 38C (100.4F)  dextrose Oral Gel 15 Gram(s) Oral once PRN Blood Glucose LESS THAN 70 milliGRAM(s)/deciliter  HYDROmorphone  Injectable 0.5 milliGRAM(s) IV Push every 3 hours PRN Moderate Pain (4 - 6)  ondansetron Injectable 4 milliGRAM(s) IV Push once PRN Nausea and/or Vomiting  oxyCODONE    IR 5 milliGRAM(s) Oral every 6 hours PRN Severe Pain (7 - 10)      PHYSICAL EXAM:  Constitutional: A&Ox3, NAD  Respiratory: Unlabored breathing  Abdomen: Soft, nondistended, NTTP. No rebound or guarding.  Extremities: RLE guillotine amp site c/d/i. healing appropriately. No active bleeds/purulence    LABS:                        8.5    8.87  )-----------( 195      ( 23 Nov 2023 07:17 )             27.1     11-23    135  |  103  |  8   ----------------------------<  189<H>  3.8   |  25  |  0.50    Ca    7.7<L>      23 Nov 2023 07:17  Phos  1.9     11-23  Mg     1.7     11-23          Urinalysis Basic - ( 23 Nov 2023 07:17 )    Color: x / Appearance: x / SG: x / pH: x  Gluc: 189 mg/dL / Ketone: x  / Bili: x / Urobili: x   Blood: x / Protein: x / Nitrite: x   Leuk Esterase: x / RBC: x / WBC x   Sq Epi: x / Non Sq Epi: x / Bacteria: x          IMAGING:

## 2023-11-23 NOTE — PROGRESS NOTE ADULT - SUBJECTIVE AND OBJECTIVE BOX
Subjective: Patient seen and examined. No new events except as noted.     SUBJECTIVE/ROS:        MEDICATIONS:  MEDICATIONS  (STANDING):  amLODIPine   Tablet 5 milliGRAM(s) Oral daily  chlorhexidine 2% Cloths 1 Application(s) Topical daily  dextrose 5%. 1000 milliLiter(s) (50 mL/Hr) IV Continuous <Continuous>  dextrose 5%. 1000 milliLiter(s) (100 mL/Hr) IV Continuous <Continuous>  dextrose 50% Injectable 25 Gram(s) IV Push once  dextrose 50% Injectable 25 Gram(s) IV Push once  dextrose 50% Injectable 12.5 Gram(s) IV Push once  glucagon  Injectable 1 milliGRAM(s) IntraMuscular once  influenza   Vaccine 0.5 milliLiter(s) IntraMuscular once  insulin lispro (ADMELOG) corrective regimen sliding scale   SubCutaneous three times a day before meals  insulin lispro (ADMELOG) corrective regimen sliding scale   SubCutaneous at bedtime  vancomycin  IVPB      vancomycin  IVPB 750 milliGRAM(s) IV Intermittent every 24 hours      PHYSICAL EXAM:  T(C): 36.8 (11-23-23 @ 09:05), Max: 36.8 (11-23-23 @ 09:05)  HR: 88 (11-23-23 @ 09:05) (81 - 94)  BP: 138/72 (11-23-23 @ 09:05) (138/72 - 167/91)  RR: 18 (11-23-23 @ 09:05) (18 - 19)  SpO2: 99% (11-23-23 @ 09:05) (93% - 99%)  Wt(kg): --  I&O's Summary    22 Nov 2023 07:01  -  23 Nov 2023 07:00  --------------------------------------------------------  IN: 300 mL / OUT: 4330 mL / NET: -4030 mL    23 Nov 2023 07:01  -  23 Nov 2023 10:32  --------------------------------------------------------  IN: 240 mL / OUT: 500 mL / NET: -260 mL            JVP: Normal  Neck: supple  Lung: clear   CV: S1 S2 , Murmur:  Abd: soft  Ext: No edema  neuro: Awake / alert  Psych: flat affect      LABS/DATA:    CARDIAC MARKERS:                                8.5    8.87  )-----------( 195      ( 23 Nov 2023 07:17 )             27.1     11-23    135  |  103  |  8   ----------------------------<  189<H>  3.8   |  25  |  0.50    Ca    7.7<L>      23 Nov 2023 07:17  Phos  1.9     11-23  Mg     1.7     11-23      proBNP:   Lipid Profile:   HgA1c:   TSH:     TELE:  EKG:

## 2023-11-23 NOTE — PROGRESS NOTE ADULT - ASSESSMENT
63M PMHx HTN, DM, HLD, presented to ED from rehab for acute limb ischemia. Patient was discharged about 1 week ago from OSH to rehab for conservative medical treatment of foot infection and later brought to ED for increased pain and acute discoloration of foot. Does not weightbare on right leg and has not ambulated in months. Patient now s/p R kiel ESPARZA 11/19, recovering appropriately.     Recommendations:  - DVT ppx (does not need heparin drip)  - Pain control: PCA, appreciate acute pain recs  - Wound care: BID dressing changes  - Premedicate before dressing changes as needed  - planning for formalization next week  - Remainder of care per primary team    Vascular Surgery   [id number]

## 2023-11-24 LAB
ANION GAP SERPL CALC-SCNC: 9 MMOL/L — SIGNIFICANT CHANGE UP (ref 5–17)
ANION GAP SERPL CALC-SCNC: 9 MMOL/L — SIGNIFICANT CHANGE UP (ref 5–17)
BUN SERPL-MCNC: 10 MG/DL — SIGNIFICANT CHANGE UP (ref 7–23)
BUN SERPL-MCNC: 10 MG/DL — SIGNIFICANT CHANGE UP (ref 7–23)
CALCIUM SERPL-MCNC: 7.7 MG/DL — LOW (ref 8.4–10.5)
CALCIUM SERPL-MCNC: 7.7 MG/DL — LOW (ref 8.4–10.5)
CHLORIDE SERPL-SCNC: 102 MMOL/L — SIGNIFICANT CHANGE UP (ref 96–108)
CHLORIDE SERPL-SCNC: 102 MMOL/L — SIGNIFICANT CHANGE UP (ref 96–108)
CO2 SERPL-SCNC: 24 MMOL/L — SIGNIFICANT CHANGE UP (ref 22–31)
CO2 SERPL-SCNC: 24 MMOL/L — SIGNIFICANT CHANGE UP (ref 22–31)
CREAT SERPL-MCNC: 0.49 MG/DL — LOW (ref 0.5–1.3)
CREAT SERPL-MCNC: 0.49 MG/DL — LOW (ref 0.5–1.3)
CULTURE RESULTS: NO GROWTH — SIGNIFICANT CHANGE UP
CULTURE RESULTS: NO GROWTH — SIGNIFICANT CHANGE UP
EGFR: 115 ML/MIN/1.73M2 — SIGNIFICANT CHANGE UP
EGFR: 115 ML/MIN/1.73M2 — SIGNIFICANT CHANGE UP
GLUCOSE BLDC GLUCOMTR-MCNC: 155 MG/DL — HIGH (ref 70–99)
GLUCOSE BLDC GLUCOMTR-MCNC: 155 MG/DL — HIGH (ref 70–99)
GLUCOSE BLDC GLUCOMTR-MCNC: 158 MG/DL — HIGH (ref 70–99)
GLUCOSE BLDC GLUCOMTR-MCNC: 158 MG/DL — HIGH (ref 70–99)
GLUCOSE BLDC GLUCOMTR-MCNC: 201 MG/DL — HIGH (ref 70–99)
GLUCOSE BLDC GLUCOMTR-MCNC: 201 MG/DL — HIGH (ref 70–99)
GLUCOSE SERPL-MCNC: 218 MG/DL — HIGH (ref 70–99)
GLUCOSE SERPL-MCNC: 218 MG/DL — HIGH (ref 70–99)
HCT VFR BLD CALC: 29.3 % — LOW (ref 39–50)
HCT VFR BLD CALC: 29.3 % — LOW (ref 39–50)
HGB BLD-MCNC: 9.1 G/DL — LOW (ref 13–17)
HGB BLD-MCNC: 9.1 G/DL — LOW (ref 13–17)
MCHC RBC-ENTMCNC: 25.3 PG — LOW (ref 27–34)
MCHC RBC-ENTMCNC: 25.3 PG — LOW (ref 27–34)
MCHC RBC-ENTMCNC: 31.1 GM/DL — LOW (ref 32–36)
MCHC RBC-ENTMCNC: 31.1 GM/DL — LOW (ref 32–36)
MCV RBC AUTO: 81.4 FL — SIGNIFICANT CHANGE UP (ref 80–100)
MCV RBC AUTO: 81.4 FL — SIGNIFICANT CHANGE UP (ref 80–100)
NRBC # BLD: 0 /100 WBCS — SIGNIFICANT CHANGE UP (ref 0–0)
NRBC # BLD: 0 /100 WBCS — SIGNIFICANT CHANGE UP (ref 0–0)
PLATELET # BLD AUTO: 294 K/UL — SIGNIFICANT CHANGE UP (ref 150–400)
PLATELET # BLD AUTO: 294 K/UL — SIGNIFICANT CHANGE UP (ref 150–400)
POTASSIUM SERPL-MCNC: 3.6 MMOL/L — SIGNIFICANT CHANGE UP (ref 3.5–5.3)
POTASSIUM SERPL-MCNC: 3.6 MMOL/L — SIGNIFICANT CHANGE UP (ref 3.5–5.3)
POTASSIUM SERPL-SCNC: 3.6 MMOL/L — SIGNIFICANT CHANGE UP (ref 3.5–5.3)
POTASSIUM SERPL-SCNC: 3.6 MMOL/L — SIGNIFICANT CHANGE UP (ref 3.5–5.3)
RBC # BLD: 3.6 M/UL — LOW (ref 4.2–5.8)
RBC # BLD: 3.6 M/UL — LOW (ref 4.2–5.8)
RBC # FLD: 17 % — HIGH (ref 10.3–14.5)
RBC # FLD: 17 % — HIGH (ref 10.3–14.5)
SODIUM SERPL-SCNC: 135 MMOL/L — SIGNIFICANT CHANGE UP (ref 135–145)
SODIUM SERPL-SCNC: 135 MMOL/L — SIGNIFICANT CHANGE UP (ref 135–145)
SPECIMEN SOURCE: SIGNIFICANT CHANGE UP
SPECIMEN SOURCE: SIGNIFICANT CHANGE UP
VANCOMYCIN TROUGH SERPL-MCNC: 22.2 UG/ML — HIGH (ref 10–20)
VANCOMYCIN TROUGH SERPL-MCNC: 22.2 UG/ML — HIGH (ref 10–20)
WBC # BLD: 10.62 K/UL — HIGH (ref 3.8–10.5)
WBC # BLD: 10.62 K/UL — HIGH (ref 3.8–10.5)
WBC # FLD AUTO: 10.62 K/UL — HIGH (ref 3.8–10.5)
WBC # FLD AUTO: 10.62 K/UL — HIGH (ref 3.8–10.5)

## 2023-11-24 PROCEDURE — 99222 1ST HOSP IP/OBS MODERATE 55: CPT

## 2023-11-24 PROCEDURE — 99233 SBSQ HOSP IP/OBS HIGH 50: CPT

## 2023-11-24 RX ORDER — GABAPENTIN 400 MG/1
300 CAPSULE ORAL THREE TIMES A DAY
Refills: 0 | Status: DISCONTINUED | OUTPATIENT
Start: 2023-11-24 | End: 2023-11-30

## 2023-11-24 RX ORDER — LISINOPRIL 2.5 MG/1
5 TABLET ORAL DAILY
Refills: 0 | Status: DISCONTINUED | OUTPATIENT
Start: 2023-11-24 | End: 2023-11-29

## 2023-11-24 RX ADMIN — AMLODIPINE BESYLATE 5 MILLIGRAM(S): 2.5 TABLET ORAL at 05:14

## 2023-11-24 RX ADMIN — HYDROMORPHONE HYDROCHLORIDE 0.5 MILLIGRAM(S): 2 INJECTION INTRAMUSCULAR; INTRAVENOUS; SUBCUTANEOUS at 01:05

## 2023-11-24 RX ADMIN — Medication 166.67 MILLIGRAM(S): at 17:58

## 2023-11-24 RX ADMIN — HYDROMORPHONE HYDROCHLORIDE 0.5 MILLIGRAM(S): 2 INJECTION INTRAMUSCULAR; INTRAVENOUS; SUBCUTANEOUS at 10:48

## 2023-11-24 RX ADMIN — CHLORHEXIDINE GLUCONATE 1 APPLICATION(S): 213 SOLUTION TOPICAL at 15:23

## 2023-11-24 RX ADMIN — OXYCODONE HYDROCHLORIDE 5 MILLIGRAM(S): 5 TABLET ORAL at 11:24

## 2023-11-24 RX ADMIN — GABAPENTIN 300 MILLIGRAM(S): 400 CAPSULE ORAL at 22:49

## 2023-11-24 RX ADMIN — Medication 166.67 MILLIGRAM(S): at 05:14

## 2023-11-24 RX ADMIN — HYDROMORPHONE HYDROCHLORIDE 0.5 MILLIGRAM(S): 2 INJECTION INTRAMUSCULAR; INTRAVENOUS; SUBCUTANEOUS at 11:23

## 2023-11-24 RX ADMIN — GABAPENTIN 300 MILLIGRAM(S): 400 CAPSULE ORAL at 15:57

## 2023-11-24 RX ADMIN — HYDROMORPHONE HYDROCHLORIDE 0.5 MILLIGRAM(S): 2 INJECTION INTRAMUSCULAR; INTRAVENOUS; SUBCUTANEOUS at 15:58

## 2023-11-24 RX ADMIN — Medication 1: at 17:01

## 2023-11-24 RX ADMIN — LISINOPRIL 5 MILLIGRAM(S): 2.5 TABLET ORAL at 17:55

## 2023-11-24 RX ADMIN — HYDROMORPHONE HYDROCHLORIDE 0.5 MILLIGRAM(S): 2 INJECTION INTRAMUSCULAR; INTRAVENOUS; SUBCUTANEOUS at 16:25

## 2023-11-24 RX ADMIN — OXYCODONE HYDROCHLORIDE 5 MILLIGRAM(S): 5 TABLET ORAL at 12:00

## 2023-11-24 RX ADMIN — HYDROMORPHONE HYDROCHLORIDE 0.5 MILLIGRAM(S): 2 INJECTION INTRAMUSCULAR; INTRAVENOUS; SUBCUTANEOUS at 00:05

## 2023-11-24 RX ADMIN — Medication 2: at 09:24

## 2023-11-24 NOTE — PROGRESS NOTE ADULT - SUBJECTIVE AND OBJECTIVE BOX
Vascular Surgery Progress Note  Patient is a 63y old  Male who presents with a chief complaint of Rt foot pain (24 Nov 2023 08:43)      INTERVAL EVENTS: No acute events overnight.  SUBJECTIVE: Patient seen and examined at bedside with surgical team, patient without complaints. Denies fever, chills, CP, SOB nausea, vomiting, abdominal pain. + pain at amp site      OBJECTIVE:    Vital Signs Last 24 Hrs  T(C): 36.6 (24 Nov 2023 05:13), Max: 37.2 (23 Nov 2023 17:05)  T(F): 97.9 (24 Nov 2023 05:13), Max: 98.9 (23 Nov 2023 17:05)  HR: 75 (24 Nov 2023 05:13) (75 - 100)  BP: 163/81 (24 Nov 2023 05:13) (145/89 - 163/81)  BP(mean): --  RR: 18 (24 Nov 2023 05:13) (18 - 18)  SpO2: 96% (24 Nov 2023 05:13) (94% - 97%)    Parameters below as of 24 Nov 2023 05:13  Patient On (Oxygen Delivery Method): room air    I&O's Detail    23 Nov 2023 07:01  -  24 Nov 2023 07:00  --------------------------------------------------------  IN:    IV PiggyBack: 500 mL    Oral Fluid: 630 mL  Total IN: 1130 mL    OUT:    Indwelling Catheter - Urethral (mL): 2800 mL  Total OUT: 2800 mL    Total NET: -1670 mL      MEDICATIONS  (STANDING):  amLODIPine   Tablet 5 milliGRAM(s) Oral daily  chlorhexidine 2% Cloths 1 Application(s) Topical daily  dextrose 5%. 1000 milliLiter(s) (50 mL/Hr) IV Continuous <Continuous>  dextrose 5%. 1000 milliLiter(s) (100 mL/Hr) IV Continuous <Continuous>  dextrose 50% Injectable 25 Gram(s) IV Push once  dextrose 50% Injectable 25 Gram(s) IV Push once  dextrose 50% Injectable 12.5 Gram(s) IV Push once  glucagon  Injectable 1 milliGRAM(s) IntraMuscular once  influenza   Vaccine 0.5 milliLiter(s) IntraMuscular once  insulin lispro (ADMELOG) corrective regimen sliding scale   SubCutaneous three times a day before meals  insulin lispro (ADMELOG) corrective regimen sliding scale   SubCutaneous at bedtime  vancomycin  IVPB      vancomycin  IVPB 1250 milliGRAM(s) IV Intermittent every 12 hours    MEDICATIONS  (PRN):  acetaminophen     Tablet .. 650 milliGRAM(s) Oral every 6 hours PRN Temp greater or equal to 38C (100.4F)  dextrose Oral Gel 15 Gram(s) Oral once PRN Blood Glucose LESS THAN 70 milliGRAM(s)/deciliter  HYDROmorphone  Injectable 0.5 milliGRAM(s) IV Push every 3 hours PRN Moderate Pain (4 - 6)  ondansetron Injectable 4 milliGRAM(s) IV Push once PRN Nausea and/or Vomiting  oxyCODONE    IR 5 milliGRAM(s) Oral every 6 hours PRN Severe Pain (7 - 10)      PHYSICAL EXAM:  Constitutional: A&Ox3, NAD  Respiratory: Unlabored breathing  Abdomen: Soft, nondistended, NTTP. No rebound or guarding.  Extremities: WWP, MCNEILL spontaneously. RLE BKA stump healthy appearing, no purulence, s/s drainage    LABS:                        9.1    10.62 )-----------( 294      ( 24 Nov 2023 06:55 )             29.3     11-24    135  |  102  |  10  ----------------------------<  218<H>  3.6   |  24  |  0.49<L>    Ca    7.7<L>      24 Nov 2023 06:55  Phos  1.9     11-23  Mg     1.7     11-23          Urinalysis Basic - ( 24 Nov 2023 06:55 )    Color: x / Appearance: x / SG: x / pH: x  Gluc: 218 mg/dL / Ketone: x  / Bili: x / Urobili: x   Blood: x / Protein: x / Nitrite: x   Leuk Esterase: x / RBC: x / WBC x   Sq Epi: x / Non Sq Epi: x / Bacteria: x          IMAGING:     Vascular Surgery Progress Note  Patient is a 63y old  Male who presents with a chief complaint of Rt foot pain (24 Nov 2023 08:43)      INTERVAL EVENTS: No acute events overnight.    SUBJECTIVE: Patient seen and examined at bedside with surgical team. Denies fever, chills, CP, SOB nausea, vomiting, abdominal pain. + pain at amp site, unchanged.      OBJECTIVE:    Vital Signs Last 24 Hrs  T(C): 36.6 (24 Nov 2023 05:13), Max: 37.2 (23 Nov 2023 17:05)  T(F): 97.9 (24 Nov 2023 05:13), Max: 98.9 (23 Nov 2023 17:05)  HR: 75 (24 Nov 2023 05:13) (75 - 100)  BP: 163/81 (24 Nov 2023 05:13) (145/89 - 163/81)  BP(mean): --  RR: 18 (24 Nov 2023 05:13) (18 - 18)  SpO2: 96% (24 Nov 2023 05:13) (94% - 97%)    Parameters below as of 24 Nov 2023 05:13  Patient On (Oxygen Delivery Method): room air    I&O's Detail    23 Nov 2023 07:01  -  24 Nov 2023 07:00  --------------------------------------------------------  IN:    IV PiggyBack: 500 mL    Oral Fluid: 630 mL  Total IN: 1130 mL    OUT:    Indwelling Catheter - Urethral (mL): 2800 mL  Total OUT: 2800 mL    Total NET: -1670 mL      MEDICATIONS  (STANDING):  amLODIPine   Tablet 5 milliGRAM(s) Oral daily  chlorhexidine 2% Cloths 1 Application(s) Topical daily  dextrose 5%. 1000 milliLiter(s) (50 mL/Hr) IV Continuous <Continuous>  dextrose 5%. 1000 milliLiter(s) (100 mL/Hr) IV Continuous <Continuous>  dextrose 50% Injectable 25 Gram(s) IV Push once  dextrose 50% Injectable 25 Gram(s) IV Push once  dextrose 50% Injectable 12.5 Gram(s) IV Push once  glucagon  Injectable 1 milliGRAM(s) IntraMuscular once  influenza   Vaccine 0.5 milliLiter(s) IntraMuscular once  insulin lispro (ADMELOG) corrective regimen sliding scale   SubCutaneous three times a day before meals  insulin lispro (ADMELOG) corrective regimen sliding scale   SubCutaneous at bedtime  vancomycin  IVPB      vancomycin  IVPB 1250 milliGRAM(s) IV Intermittent every 12 hours    MEDICATIONS  (PRN):  acetaminophen     Tablet .. 650 milliGRAM(s) Oral every 6 hours PRN Temp greater or equal to 38C (100.4F)  dextrose Oral Gel 15 Gram(s) Oral once PRN Blood Glucose LESS THAN 70 milliGRAM(s)/deciliter  HYDROmorphone  Injectable 0.5 milliGRAM(s) IV Push every 3 hours PRN Moderate Pain (4 - 6)  ondansetron Injectable 4 milliGRAM(s) IV Push once PRN Nausea and/or Vomiting  oxyCODONE    IR 5 milliGRAM(s) Oral every 6 hours PRN Severe Pain (7 - 10)      PHYSICAL EXAM:  Constitutional: A&Ox3, NAD  Respiratory: Unlabored breathing  Abdomen: Soft, nondistended, NTTP. No rebound or guarding.  Extremities: WWP, MCNEILL spontaneously. RLE BKA stump healthy appearing, no purulence, s/s drainage    LABS:                        9.1    10.62 )-----------( 294      ( 24 Nov 2023 06:55 )             29.3     11-24    135  |  102  |  10  ----------------------------<  218<H>  3.6   |  24  |  0.49<L>    Ca    7.7<L>      24 Nov 2023 06:55  Phos  1.9     11-23  Mg     1.7     11-23          Urinalysis Basic - ( 24 Nov 2023 06:55 )    Color: x / Appearance: x / SG: x / pH: x  Gluc: 218 mg/dL / Ketone: x  / Bili: x / Urobili: x   Blood: x / Protein: x / Nitrite: x   Leuk Esterase: x / RBC: x / WBC x   Sq Epi: x / Non Sq Epi: x / Bacteria: x          IMAGING:

## 2023-11-24 NOTE — PROGRESS NOTE ADULT - SUBJECTIVE AND OBJECTIVE BOX
Subjective: Patient seen and examined. No new events except as noted.     SUBJECTIVE/ROS:  nad      MEDICATIONS:  MEDICATIONS  (STANDING):  amLODIPine   Tablet 5 milliGRAM(s) Oral daily  chlorhexidine 2% Cloths 1 Application(s) Topical daily  dextrose 5%. 1000 milliLiter(s) (50 mL/Hr) IV Continuous <Continuous>  dextrose 5%. 1000 milliLiter(s) (100 mL/Hr) IV Continuous <Continuous>  dextrose 50% Injectable 25 Gram(s) IV Push once  dextrose 50% Injectable 25 Gram(s) IV Push once  dextrose 50% Injectable 12.5 Gram(s) IV Push once  glucagon  Injectable 1 milliGRAM(s) IntraMuscular once  influenza   Vaccine 0.5 milliLiter(s) IntraMuscular once  insulin lispro (ADMELOG) corrective regimen sliding scale   SubCutaneous three times a day before meals  insulin lispro (ADMELOG) corrective regimen sliding scale   SubCutaneous at bedtime  vancomycin  IVPB 1250 milliGRAM(s) IV Intermittent every 12 hours  vancomycin  IVPB          PHYSICAL EXAM:  T(C): 36.6 (11-24-23 @ 05:13), Max: 37.2 (11-23-23 @ 17:05)  HR: 75 (11-24-23 @ 05:13) (75 - 100)  BP: 163/81 (11-24-23 @ 05:13) (138/72 - 163/81)  RR: 18 (11-24-23 @ 05:13) (18 - 18)  SpO2: 96% (11-24-23 @ 05:13) (94% - 99%)  Wt(kg): --  I&O's Summary    23 Nov 2023 07:01  -  24 Nov 2023 07:00  --------------------------------------------------------  IN: 1130 mL / OUT: 2800 mL / NET: -1670 mL            JVP: Normal  Neck: supple  Lung: clear   CV: S1 S2 , Murmur:  Abd: soft  Ext: No edema  neuro: Awake / alert  Psych: flat affect      LABS/DATA:    CARDIAC MARKERS:                                9.1    10.62 )-----------( 294      ( 24 Nov 2023 06:55 )             29.3     11-24    135  |  102  |  10  ----------------------------<  218<H>  3.6   |  24  |  0.49<L>    Ca    7.7<L>      24 Nov 2023 06:55  Phos  1.9     11-23  Mg     1.7     11-23      proBNP:   Lipid Profile:   HgA1c:   TSH:     TELE:  EKG:

## 2023-11-24 NOTE — PROGRESS NOTE ADULT - ASSESSMENT
63 M pmhx htn, dm, hld presents to ED from rehab for acute limb ischemia.    Acute Limb  Ischemia   CTA A/P with b/l LE run-noff significant for right external iliac artery occlusion with no reconstitution of flow to the distal RLE. 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       s/p BKA   Repeat Cx  bacteremia  Continue Vancomycin as per ID     Vascular Podiatry consulted     Wound Care consult appreciated   Cards eval appreciated     OLIVER Contrats induced    [place]   Urine retention       DM HISS   A1C     HTN Home meds   Planning for formalization next week  MARINO       Psych consult

## 2023-11-24 NOTE — PROGRESS NOTE ADULT - ASSESSMENT
Suspect CNS in blood culture contaminant from single phlebotomy  LE changes do not look primarily infectious  Doubt cardioembolic disease in this setting (eg endocarditis)  Denies any symptoms PTA of infection  If there is infection in the foot, antibiotics will not get to the target given absent perfusion  Leukocytosis likely reactive to ischemia  Patient is in pain from ischemia  D/W patient, he's amenable now to talking with surgery again re: BKA    11/17: Still not entirely convinced this coagulase-negative staphylococcal bacteremia represents true infection, however no objection to continuing vancomycin as dosed by renal.  He has acute kidney injury likely secondary to contrast.  If this is a true bacteremia related to his ischemic limb, source control remains the key issue–which in this case is below-knee amputation.  At this point in time there is no ID contraindication to proposed surgery.  11/20: POD#1 kiel PEÑAA. Bacteremia, sustained with S. epidermidis hard to disregard but somewhat surprising.   11/22: ECHO suboptimal. 11/21 cx pending. Creatinine dropped sharply.   11/24: Awaiting closure of BKA.  Awaiting MARINO.  Drop in creatinine does not appear to been spurious.  Low vancomycin trough correlates with this, dose adjusted previously. F/U cx thus far NTD.     Suggestions--  F/U repeat Cx data  Continue Vancomycin  Check vanco trough before 4th dose after dose change, target 10-15.   Intensive monitoring of vancomycin levels is required to monitor for toxicity.   Monitor levels and creatinine, renal following  Awaiting MARINO and definitive surgery.     If any ID input is needed over the weekend, please call [phone]. Thanks.    Ayaz Morales MD  Attending Physician  [facility]  Division of Infectious Diseases  [phone]

## 2023-11-24 NOTE — PROGRESS NOTE ADULT - SUBJECTIVE AND OBJECTIVE BOX
[facility]  Division of Infectious Diseases  [phone]    Name: AIXA LASRON  Age: 63y  Gender: Male  MRN: [id number]    Interval History--  Notes reviewed.     Past Medical History--  Hypertension    Diabetes        For details regarding the patient's social history, family history, and other miscellaneous elements, please refer the initial infectious diseases consultation and/or the admitting history and physical examination for this admission.    Allergies    penicillin (Anaphylaxis)    Intolerances        Medications--  Antibiotics:  vancomycin  IVPB 1250 milliGRAM(s) IV Intermittent every 12 hours  vancomycin  IVPB        Immunologic:  influenza   Vaccine 0.5 milliLiter(s) IntraMuscular once    Other:  acetaminophen     Tablet .. PRN  amLODIPine   Tablet  chlorhexidine 2% Cloths  dextrose 5%.  dextrose 5%.  dextrose 50% Injectable  dextrose 50% Injectable  dextrose 50% Injectable  dextrose Oral Gel PRN  glucagon  Injectable  HYDROmorphone  Injectable PRN  insulin lispro (ADMELOG) corrective regimen sliding scale  insulin lispro (ADMELOG) corrective regimen sliding scale  ondansetron Injectable PRN  oxyCODONE    IR PRN      Review of Systems--  A 10-point review of systems was obtained.     Pertinent positives and negatives--  Constitutional: No fevers. No Chills. No Rigors.   Cardiovascular: No chest pain. No palpitations.  Respiratory: No shortness of breath. No cough.  Gastrointestinal: No nausea or vomiting. No diarrhea or constipation.   Psychiatric: Pleasant. Appropriate affect.    Review of systems otherwise negative except as previously noted.    Physical Examination--  Vital Signs: T(F): 97.9 (11-24-23 @ 05:13), Max: 98.9 (11-23-23 @ 17:05)  HR: 75 (11-24-23 @ 05:13)  BP: 163/81 (11-24-23 @ 05:13)  RR: 18 (11-24-23 @ 05:13)  SpO2: 96% (11-24-23 @ 05:13)  Wt(kg): --  General: Nontoxic-appearing Male in no acute distress.  HEENT: AT/NC. PERRL. EOMI. Anicteric. Conjunctiva pink and moist. Oropharynx clear. Dentition fair.  Neck: Not rigid. No sense of mass.  Nodes: None palpable.  Lungs: Clear bilaterally without rales, wheezing or rhonchi  Heart: Regular rate and rhythm. No Murmur. No rub. No gallop. No palpable thrill.  Abdomen: Bowel sounds present and normoactive. Soft. Nondistended. Nontender. No sense of mass. No organomegaly.  Back: No spinal tenderness. No costovertebral angle tenderness.   Extremities: No cyanosis or clubbing. No edema.   Skin: Warm. Dry. Good turgor. No rash. No vasculitic stigmata.  Psychiatric: Appropriate affect and mood for situation.         Laboratory Studies--  CBC                        9.1    10.62 )-----------( 294      ( 24 Nov 2023 06:55 )             29.3       Chemistries  11-24    135  |  102  |  10  ----------------------------<  218<H>  3.6   |  24  |  0.49<L>    Ca    7.7<L>      24 Nov 2023 06:55  Phos  1.9     11-23  Mg     1.7     11-23        Culture Data    Culture - Blood (collected 21 Nov 2023 08:24)  Source: .Blood Blood-Venous  Preliminary Report (23 Nov 2023 17:01):    No growth at 48 Hours    Culture - Blood (collected 21 Nov 2023 05:37)  Source: .Blood Blood-Peripheral  Preliminary Report (23 Nov 2023 17:01):    No growth at 48 Hours    Culture - Surgical Swab (collected 19 Nov 2023 16:13)  Source: .Surgical Swab Surgical Swab  Preliminary Report (20 Nov 2023 19:03):    No growth             [facility]  Division of Infectious Diseases  [phone]    Name: AIXA LARSON  Age: 63y  Gender: Male  MRN: [id number]    Interval History--  Notes reviewed. Seen earlier today.  Anxious demeanor, not substantially changed from self my prior interactions.  Pain control not an issue.  Waiting on transesophageal echocardiogram.  Numerous questions regarding next surgery, majority of which are deferred to vascular surgery team.    Past Medical History--  Hypertension    Diabetes        For details regarding the patient's social history, family history, and other miscellaneous elements, please refer the initial infectious diseases consultation and/or the admitting history and physical examination for this admission.    Allergies    penicillin (Anaphylaxis)    Intolerances        Medications--  Antibiotics:  vancomycin  IVPB 1250 milliGRAM(s) IV Intermittent every 12 hours  vancomycin  IVPB        Immunologic:  influenza   Vaccine 0.5 milliLiter(s) IntraMuscular once    Other:  acetaminophen     Tablet .. PRN  amLODIPine   Tablet  chlorhexidine 2% Cloths  dextrose 5%.  dextrose 5%.  dextrose 50% Injectable  dextrose 50% Injectable  dextrose 50% Injectable  dextrose Oral Gel PRN  glucagon  Injectable  HYDROmorphone  Injectable PRN  insulin lispro (ADMELOG) corrective regimen sliding scale  insulin lispro (ADMELOG) corrective regimen sliding scale  ondansetron Injectable PRN  oxyCODONE    IR PRN      Review of Systems--  A 10-point review of systems was obtained.   Review of systems otherwise negative except as previously noted.    Physical Examination--  Vital Signs: T(F): 97.9 (11-24-23 @ 05:13), Max: 98.9 (11-23-23 @ 17:05)  HR: 75 (11-24-23 @ 05:13)  BP: 163/81 (11-24-23 @ 05:13)  RR: 18 (11-24-23 @ 05:13)  SpO2: 96% (11-24-23 @ 05:13)  Wt(kg): --  General: Nontoxic-appearing Male in no acute distress.  HEENT: Anicteric. Conjunctiva pink and moist. Oropharynx clear.  Neck: Not rigid. No sense of mass.  Nodes: None palpable.  Lungs: Diminished BS bilaterally without rales, wheezing or rhonchi  Heart: Regular rate and rhythm.   Abdomen: Bowel sounds present and normoactive. Soft. Nondistended. Nontender.   Extremities: RLE dressed. No C/C/E.   Psychiatric::  Anxious though mostly appropriate mood and affect.        Laboratory Studies--  CBC                        9.1    10.62 )-----------( 294      ( 24 Nov 2023 06:55 )             29.3       Chemistries  11-24    135  |  102  |  10  ----------------------------<  218<H>  3.6   |  24  |  0.49<L>    Ca    7.7<L>      24 Nov 2023 06:55  Phos  1.9     11-23  Mg     1.7     11-23        Culture Data    Culture - Blood (collected 21 Nov 2023 08:24)  Source: .Blood Blood-Venous  Preliminary Report (23 Nov 2023 17:01):    No growth at 48 Hours    Culture - Blood (collected 21 Nov 2023 05:37)  Source: .Blood Blood-Peripheral  Preliminary Report (23 Nov 2023 17:01):    No growth at 48 Hours    Culture - Surgical Swab (collected 19 Nov 2023 16:13)  Source: .Surgical Swab Surgical Swab  Preliminary Report (20 Nov 2023 19:03):    No growth    Culture - Blood (collected 16 Nov 2023 19:34)  Source: .Blood Blood-Peripheral  Gram Stain (18 Nov 2023 02:08):    Growth in anaerobic bottle: Gram Positive Cocci in Clusters    Growth in aerobic bottle: Gram Positive Cocci in Clusters  Final Report (18 Nov 2023 22:44):    Growth in aerobic and anaerobic bottles: Staphylococcus epidermidis    Culture - Blood (collected 16 Nov 2023 19:33)  Source: .Blood Blood-Peripheral  Gram Stain (18 Nov 2023 02:38):    Growth in aerobic bottle: Gram Positive Cocci in Clusters    Growth in anaerobic bottle: Gram Positive Cocci in Clusters  Final Report (18 Nov 2023 22:45):    Growth in aerobic and anaerobic bottles: Staphylococcus epidermidis    See previous culture [id number]    Culture - Blood (collected 15 Nov 2023 12:50)  Source: .Blood Blood-Peripheral  Gram Stain (16 Nov 2023 16:29):    Growth in anaerobic bottle: Gram Positive Cocci in Clusters    Growth in aerobic bottle: Gram Positive Cocci in Clusters  Final Report (18 Nov 2023 20:43):    Growth in aerobic and anaerobic bottles: Staphylococcus epidermidis  Organism: Staphylococcus epidermidis (18 Nov 2023 20:43)  Organism: Staphylococcus epidermidis (18 Nov 2023 20:43)    Culture - Blood (collected 15 Nov 2023 12:35)  Source: .Blood Blood-Peripheral  Gram Stain (16 Nov 2023 15:33):    Growth in aerobic bottle: Gram Positive Cocci in Clusters    Growth in anaerobic bottle: Gram Positive Cocci in Clusters  Final Report (17 Nov 2023 21:09):    Growth in aerobic and anaerobic bottles: Staphylococcus epidermidis    Direct identification is available within approximately 3-5    hours either by Blood Panel Multiplexed PCR or Direct    MALDI-TOF. Details: [email or web address]  Organism: Blood Culture PCR (17 Nov 2023 21:09)  Organism: Blood Culture PCR (17 Nov 2023 21:09)

## 2023-11-24 NOTE — PROGRESS NOTE ADULT - SUBJECTIVE AND OBJECTIVE BOX
Patient is a 63y old  Male who presents with a chief complaint of Rt foot pain (19 Nov 2023 08:24)      SUBJECTIVE / OVERNIGHT EVENTS: no events   s/p BKA     T(C): 36.8 (11-24-23 @ 21:18), Max: 36.8 (11-24-23 @ 13:29)  HR: 80 (11-24-23 @ 21:18) (80 - 96)  BP: 157/87 (11-24-23 @ 21:18) (157/87 - 183/99)  RR: 18 (11-24-23 @ 21:18) (18 - 18)  SpO2: 95% (11-24-23 @ 21:18) (95% - 99%)      MEDICATIONS  (STANDING):  amLODIPine   Tablet 5 milliGRAM(s) Oral daily  chlorhexidine 2% Cloths 1 Application(s) Topical daily  dextrose 5%. 1000 milliLiter(s) (50 mL/Hr) IV Continuous <Continuous>  dextrose 5%. 1000 milliLiter(s) (100 mL/Hr) IV Continuous <Continuous>  dextrose 50% Injectable 25 Gram(s) IV Push once  dextrose 50% Injectable 25 Gram(s) IV Push once  dextrose 50% Injectable 12.5 Gram(s) IV Push once  gabapentin 300 milliGRAM(s) Oral three times a day  glucagon  Injectable 1 milliGRAM(s) IntraMuscular once  influenza   Vaccine 0.5 milliLiter(s) IntraMuscular once  insulin lispro (ADMELOG) corrective regimen sliding scale   SubCutaneous three times a day before meals  insulin lispro (ADMELOG) corrective regimen sliding scale   SubCutaneous at bedtime  lisinopril 5 milliGRAM(s) Oral daily    MEDICATIONS  (PRN):  acetaminophen     Tablet .. 650 milliGRAM(s) Oral every 6 hours PRN Temp greater or equal to 38C (100.4F)  dextrose Oral Gel 15 Gram(s) Oral once PRN Blood Glucose LESS THAN 70 milliGRAM(s)/deciliter  HYDROmorphone  Injectable 0.5 milliGRAM(s) IV Push every 3 hours PRN Moderate Pain (4 - 6)  ondansetron Injectable 4 milliGRAM(s) IV Push once PRN Nausea and/or Vomiting  oxyCODONE    IR 5 milliGRAM(s) Oral every 6 hours PRN Severe Pain (7 - 10)          PHYSICAL EXAM:  GENERAL: NAD, well-developed  HEAD:  Atraumatic, Normocephalic  EYES: EOMI,  conjunctiva and sclera clear  NECK: Supple, No JVD  CHEST/LUNG: Clear to auscultation bilaterally; No wheeze  HEART: Regular rate and rhythm; No murmurs, rubs, or gallops  ABDOMEN: Soft, Nontender, Nondistended; Bowel sounds present  EXTREMITIES:  s/p BKA   PSYCH: AAOx3  NEUROLOGY: non-focal  SKIN: No rashes or lesions                                                              9.1    10.62 )-----------( 294      ( 24 Nov 2023 06:55 )             29.3               135|102|10<218  3.6|24|0.49  7.7,--,--  11-24 @ 06:55  RADIOLOGY & ADDITIONAL TESTS:    Imaging Personally Reviewed:    Consultant(s) Notes Reviewed:      Care Discussed with Consultants/Other Providers:

## 2023-11-25 LAB
ANION GAP SERPL CALC-SCNC: 9 MMOL/L — SIGNIFICANT CHANGE UP (ref 5–17)
ANION GAP SERPL CALC-SCNC: 9 MMOL/L — SIGNIFICANT CHANGE UP (ref 5–17)
BUN SERPL-MCNC: 8 MG/DL — SIGNIFICANT CHANGE UP (ref 7–23)
BUN SERPL-MCNC: 8 MG/DL — SIGNIFICANT CHANGE UP (ref 7–23)
CALCIUM SERPL-MCNC: 7.8 MG/DL — LOW (ref 8.4–10.5)
CALCIUM SERPL-MCNC: 7.8 MG/DL — LOW (ref 8.4–10.5)
CHLORIDE SERPL-SCNC: 103 MMOL/L — SIGNIFICANT CHANGE UP (ref 96–108)
CHLORIDE SERPL-SCNC: 103 MMOL/L — SIGNIFICANT CHANGE UP (ref 96–108)
CO2 SERPL-SCNC: 25 MMOL/L — SIGNIFICANT CHANGE UP (ref 22–31)
CO2 SERPL-SCNC: 25 MMOL/L — SIGNIFICANT CHANGE UP (ref 22–31)
CREAT SERPL-MCNC: 0.5 MG/DL — SIGNIFICANT CHANGE UP (ref 0.5–1.3)
CREAT SERPL-MCNC: 0.5 MG/DL — SIGNIFICANT CHANGE UP (ref 0.5–1.3)
EGFR: 114 ML/MIN/1.73M2 — SIGNIFICANT CHANGE UP
EGFR: 114 ML/MIN/1.73M2 — SIGNIFICANT CHANGE UP
GLUCOSE BLDC GLUCOMTR-MCNC: 187 MG/DL — HIGH (ref 70–99)
GLUCOSE BLDC GLUCOMTR-MCNC: 187 MG/DL — HIGH (ref 70–99)
GLUCOSE BLDC GLUCOMTR-MCNC: 211 MG/DL — HIGH (ref 70–99)
GLUCOSE BLDC GLUCOMTR-MCNC: 211 MG/DL — HIGH (ref 70–99)
GLUCOSE BLDC GLUCOMTR-MCNC: 255 MG/DL — HIGH (ref 70–99)
GLUCOSE BLDC GLUCOMTR-MCNC: 255 MG/DL — HIGH (ref 70–99)
GLUCOSE BLDC GLUCOMTR-MCNC: 269 MG/DL — HIGH (ref 70–99)
GLUCOSE BLDC GLUCOMTR-MCNC: 269 MG/DL — HIGH (ref 70–99)
GLUCOSE SERPL-MCNC: 203 MG/DL — HIGH (ref 70–99)
GLUCOSE SERPL-MCNC: 203 MG/DL — HIGH (ref 70–99)
HCT VFR BLD CALC: 31.4 % — LOW (ref 39–50)
HCT VFR BLD CALC: 31.4 % — LOW (ref 39–50)
HGB BLD-MCNC: 9.5 G/DL — LOW (ref 13–17)
HGB BLD-MCNC: 9.5 G/DL — LOW (ref 13–17)
MCHC RBC-ENTMCNC: 24.2 PG — LOW (ref 27–34)
MCHC RBC-ENTMCNC: 24.2 PG — LOW (ref 27–34)
MCHC RBC-ENTMCNC: 30.3 GM/DL — LOW (ref 32–36)
MCHC RBC-ENTMCNC: 30.3 GM/DL — LOW (ref 32–36)
MCV RBC AUTO: 80.1 FL — SIGNIFICANT CHANGE UP (ref 80–100)
MCV RBC AUTO: 80.1 FL — SIGNIFICANT CHANGE UP (ref 80–100)
NRBC # BLD: 0 /100 WBCS — SIGNIFICANT CHANGE UP (ref 0–0)
NRBC # BLD: 0 /100 WBCS — SIGNIFICANT CHANGE UP (ref 0–0)
PLATELET # BLD AUTO: 338 K/UL — SIGNIFICANT CHANGE UP (ref 150–400)
PLATELET # BLD AUTO: 338 K/UL — SIGNIFICANT CHANGE UP (ref 150–400)
POTASSIUM SERPL-MCNC: 3.5 MMOL/L — SIGNIFICANT CHANGE UP (ref 3.5–5.3)
POTASSIUM SERPL-MCNC: 3.5 MMOL/L — SIGNIFICANT CHANGE UP (ref 3.5–5.3)
POTASSIUM SERPL-SCNC: 3.5 MMOL/L — SIGNIFICANT CHANGE UP (ref 3.5–5.3)
POTASSIUM SERPL-SCNC: 3.5 MMOL/L — SIGNIFICANT CHANGE UP (ref 3.5–5.3)
RBC # BLD: 3.92 M/UL — LOW (ref 4.2–5.8)
RBC # BLD: 3.92 M/UL — LOW (ref 4.2–5.8)
RBC # FLD: 17.3 % — HIGH (ref 10.3–14.5)
RBC # FLD: 17.3 % — HIGH (ref 10.3–14.5)
SODIUM SERPL-SCNC: 137 MMOL/L — SIGNIFICANT CHANGE UP (ref 135–145)
SODIUM SERPL-SCNC: 137 MMOL/L — SIGNIFICANT CHANGE UP (ref 135–145)
VANCOMYCIN TROUGH SERPL-MCNC: 12.6 UG/ML — SIGNIFICANT CHANGE UP (ref 10–20)
VANCOMYCIN TROUGH SERPL-MCNC: 12.6 UG/ML — SIGNIFICANT CHANGE UP (ref 10–20)
WBC # BLD: 11.33 K/UL — HIGH (ref 3.8–10.5)
WBC # BLD: 11.33 K/UL — HIGH (ref 3.8–10.5)
WBC # FLD AUTO: 11.33 K/UL — HIGH (ref 3.8–10.5)
WBC # FLD AUTO: 11.33 K/UL — HIGH (ref 3.8–10.5)

## 2023-11-25 RX ORDER — VANCOMYCIN HCL 1 G
1250 VIAL (EA) INTRAVENOUS EVERY 12 HOURS
Refills: 0 | Status: COMPLETED | OUTPATIENT
Start: 2023-11-25 | End: 2023-11-26

## 2023-11-25 RX ORDER — VANCOMYCIN HCL 1 G
VIAL (EA) INTRAVENOUS
Refills: 0 | Status: COMPLETED | OUTPATIENT
Start: 2023-11-25 | End: 2023-11-26

## 2023-11-25 RX ORDER — VANCOMYCIN HCL 1 G
1250 VIAL (EA) INTRAVENOUS ONCE
Refills: 0 | Status: COMPLETED | OUTPATIENT
Start: 2023-11-25 | End: 2023-11-25

## 2023-11-25 RX ADMIN — Medication 166.67 MILLIGRAM(S): at 17:08

## 2023-11-25 RX ADMIN — AMLODIPINE BESYLATE 5 MILLIGRAM(S): 2.5 TABLET ORAL at 06:28

## 2023-11-25 RX ADMIN — GABAPENTIN 300 MILLIGRAM(S): 400 CAPSULE ORAL at 22:11

## 2023-11-25 RX ADMIN — HYDROMORPHONE HYDROCHLORIDE 0.5 MILLIGRAM(S): 2 INJECTION INTRAMUSCULAR; INTRAVENOUS; SUBCUTANEOUS at 14:02

## 2023-11-25 RX ADMIN — OXYCODONE HYDROCHLORIDE 5 MILLIGRAM(S): 5 TABLET ORAL at 23:59

## 2023-11-25 RX ADMIN — GABAPENTIN 300 MILLIGRAM(S): 400 CAPSULE ORAL at 13:37

## 2023-11-25 RX ADMIN — HYDROMORPHONE HYDROCHLORIDE 0.5 MILLIGRAM(S): 2 INJECTION INTRAMUSCULAR; INTRAVENOUS; SUBCUTANEOUS at 13:02

## 2023-11-25 RX ADMIN — Medication 166.67 MILLIGRAM(S): at 06:31

## 2023-11-25 RX ADMIN — Medication 1: at 17:13

## 2023-11-25 RX ADMIN — GABAPENTIN 300 MILLIGRAM(S): 400 CAPSULE ORAL at 06:28

## 2023-11-25 RX ADMIN — LISINOPRIL 5 MILLIGRAM(S): 2.5 TABLET ORAL at 06:27

## 2023-11-25 RX ADMIN — Medication 3: at 11:02

## 2023-11-25 RX ADMIN — CHLORHEXIDINE GLUCONATE 1 APPLICATION(S): 213 SOLUTION TOPICAL at 14:45

## 2023-11-25 RX ADMIN — Medication 3: at 13:38

## 2023-11-25 NOTE — CONSULT NOTE ADULT - SUBJECTIVE AND OBJECTIVE BOX
Patient is a 63yr old male inpatient who presents with a chief complaint of Rt foot pain. Dental consulted to evaluate any possible aspiration risks prior to MARINO.    HPI:  64 y/o male with pmhx htn, dm, hld  presents to the ED sent in from rehab center for right foot discoloration and pain. Patient states that he was discharged about 1 week ago from outside hospital after being treated for infection to right foot. He has been in rehab center for about 1 week. States today nurses looked at his foot and sent him to [facility] ED for evaluation. Patient has not been on any abx for foot while in rehab.   No hx  fevers, chills, chest pain, cough, n/v/d.     PAST MEDICAL & SURGICAL HISTORY:  Hypertension  Diabetes    MEDICATIONS  (STANDING):  amLODIPine   Tablet 5 milliGRAM(s) Oral daily  chlorhexidine 2% Cloths 1 Application(s) Topical daily  dextrose 5%. 1000 milliLiter(s) (50 mL/Hr) IV Continuous <Continuous>  dextrose 5%. 1000 milliLiter(s) (100 mL/Hr) IV Continuous <Continuous>  dextrose 50% Injectable 12.5 Gram(s) IV Push once  dextrose 50% Injectable 25 Gram(s) IV Push once  dextrose 50% Injectable 25 Gram(s) IV Push once  gabapentin 300 milliGRAM(s) Oral three times a day  glucagon  Injectable 1 milliGRAM(s) IntraMuscular once  influenza   Vaccine 0.5 milliLiter(s) IntraMuscular once  insulin lispro (ADMELOG) corrective regimen sliding scale   SubCutaneous at bedtime  insulin lispro (ADMELOG) corrective regimen sliding scale   SubCutaneous three times a day before meals  lisinopril 5 milliGRAM(s) Oral daily  vancomycin  IVPB 1250 milliGRAM(s) IV Intermittent every 12 hours  vancomycin  IVPB      MEDICATIONS  (PRN):  acetaminophen     Tablet .. 650 milliGRAM(s) Oral every 6 hours PRN Temp greater or equal to 38C (100.4F)  dextrose Oral Gel 15 Gram(s) Oral once PRN Blood Glucose LESS THAN 70 milliGRAM(s)/deciliter  HYDROmorphone  Injectable 0.5 milliGRAM(s) IV Push every 3 hours PRN Moderate Pain (4 - 6)  ondansetron Injectable 4 milliGRAM(s) IV Push once PRN Nausea and/or Vomiting  oxyCODONE    IR 5 milliGRAM(s) Oral every 6 hours PRN Severe Pain (7 - 10)    Allergies  penicillin (Anaphylaxis)  Intolerances  EOE: Mandible FROM             Facial bones and MOM grossly intact             (-) trismus             (-) LAD             (-) swelling             (-) asymmetry             (-) palpation             (-) SOB             (-) dysphagia             (-) LOC  IOE:  Permanent dentition, partially edentulous           tongue/FOM WNL           labial/buccal mucosa WNL           (-) percussion           (-) palpation           (-) swelling  Dentition present: Permanent dentition, partially edentulous, remaining maxillary anterior teeth  Mobility: Grade 1 mobility teeth #22 and #27, Grade 2 mobility teeth #23, #24, #26, Grade 3 mobility #25  Severe plaque and calculus accumulation on remaining dentition, severe gingival recession. Gingiva severely edematous and erythematous.    Radiographs: Unable to be taken due patient being non-transportable.    WBC Count: 11.33 K/uL *H* [3.80 - 10.50] (11-25 @ 05:20)  Platelet Count - Automated: 338 K/uL [150 - 400] (11-25 @ 05:20)  Platelet Count - Automated: 294 K/uL [150 - 400] (11-24 @ 06:55)    ASSESSMENT: Tooth #25 has grade 3 mobility and may pose as an aspiration risk upon MARINO.    PROCEDURE:  Limited bedside exam with verbal consent from patient. Patient non-transportable.  Discussed with med team: recommended extracting tooth #25 due to aspiration risk. Will need medical clearance for procedure before attempting extraction.    RECOMMENDATIONS:   1) Extract #25 prior to MARINO following medical clearance  2) Dental to coordinate with med team to schedule extractions.     Consulted with Dr. Adán Prater, DDS #[id number]

## 2023-11-25 NOTE — PROGRESS NOTE ADULT - SUBJECTIVE AND OBJECTIVE BOX
SUBJECTIVE: Patient seen and examined on AM rounds. Patient reports that they're feeling well. Denies fever, chills. Reports pain as controlled. No complaints at this time.     Vital Signs Last 24 Hrs  T(C): 36.5 (25 Nov 2023 10:17), Max: 37 (25 Nov 2023 01:09)  T(F): 97.7 (25 Nov 2023 10:17), Max: 98.6 (25 Nov 2023 01:09)  HR: 101 (25 Nov 2023 10:17) (80 - 101)  BP: 142/90 (25 Nov 2023 10:17) (136/84 - 183/99)  BP(mean): --  RR: 18 (25 Nov 2023 10:17) (18 - 18)  SpO2: 92% (25 Nov 2023 10:17) (92% - 100%)    Parameters below as of 25 Nov 2023 10:17  Patient On (Oxygen Delivery Method): room air        General Appearance: Appears well, NAD  Neck: Supple  Chest: Equal expansion bilaterally  CV: Pulse regular presently  Abdomen: Soft, nontense, nontender  Extremities: WWP, MCNEILL spontaneously. RLE BKA stump healthy appearing, no purulence, s/s drainage    I&O's Summary    24 Nov 2023 07:01  -  25 Nov 2023 07:00  --------------------------------------------------------  IN: 490 mL / OUT: 3175 mL / NET: -2685 mL    25 Nov 2023 07:01  -  25 Nov 2023 11:41  --------------------------------------------------------  IN: 300 mL / OUT: 850 mL / NET: -550 mL      I&O's Detail    24 Nov 2023 07:01  -  25 Nov 2023 07:00  --------------------------------------------------------  IN:    IV PiggyBack: 250 mL    Oral Fluid: 240 mL  Total IN: 490 mL    OUT:    Indwelling Catheter - Urethral (mL): 3175 mL  Total OUT: 3175 mL    Total NET: -2685 mL      25 Nov 2023 07:01  -  25 Nov 2023 11:41  --------------------------------------------------------  IN:    Oral Fluid: 300 mL  Total IN: 300 mL    OUT:    Indwelling Catheter - Urethral (mL): 850 mL  Total OUT: 850 mL    Total NET: -550 mL          LABS:                        9.5    11.33 )-----------( 338      ( 25 Nov 2023 05:20 )             31.4     11-25    137  |  103  |  8   ----------------------------<  203<H>  3.5   |  25  |  0.50    Ca    7.8<L>      25 Nov 2023 05:19        Urinalysis Basic - ( 25 Nov 2023 05:19 )    Color: x / Appearance: x / SG: x / pH: x  Gluc: 203 mg/dL / Ketone: x  / Bili: x / Urobili: x   Blood: x / Protein: x / Nitrite: x   Leuk Esterase: x / RBC: x / WBC x   Sq Epi: x / Non Sq Epi: x / Bacteria: x        RADIOLOGY & ADDITIONAL STUDIES:

## 2023-11-25 NOTE — PROGRESS NOTE ADULT - SUBJECTIVE AND OBJECTIVE BOX
Subjective: Patient seen and examined. No new events except as noted.     SUBJECTIVE/ROS:  MEDICATIONS:  MEDICATIONS  (STANDING):  amLODIPine   Tablet 5 milliGRAM(s) Oral daily  chlorhexidine 2% Cloths 1 Application(s) Topical daily  dextrose 5%. 1000 milliLiter(s) (50 mL/Hr) IV Continuous <Continuous>  dextrose 5%. 1000 milliLiter(s) (100 mL/Hr) IV Continuous <Continuous>  dextrose 50% Injectable 12.5 Gram(s) IV Push once  dextrose 50% Injectable 25 Gram(s) IV Push once  dextrose 50% Injectable 25 Gram(s) IV Push once  gabapentin 300 milliGRAM(s) Oral three times a day  glucagon  Injectable 1 milliGRAM(s) IntraMuscular once  influenza   Vaccine 0.5 milliLiter(s) IntraMuscular once  insulin lispro (ADMELOG) corrective regimen sliding scale   SubCutaneous at bedtime  insulin lispro (ADMELOG) corrective regimen sliding scale   SubCutaneous three times a day before meals  lisinopril 5 milliGRAM(s) Oral daily  vancomycin  IVPB 1250 milliGRAM(s) IV Intermittent every 12 hours  vancomycin  IVPB          PHYSICAL EXAM:  T(C): 36.8 (11-25-23 @ 05:02), Max: 37 (11-25-23 @ 01:09)  HR: 87 (11-25-23 @ 05:02) (80 - 96)  BP: 136/84 (11-25-23 @ 05:02) (136/84 - 183/99)  RR: 18 (11-25-23 @ 05:02) (18 - 18)  SpO2: 100% (11-25-23 @ 05:02) (95% - 100%)  Wt(kg): --  I&O's Summary    24 Nov 2023 07:01  -  25 Nov 2023 07:00  --------------------------------------------------------  IN: 490 mL / OUT: 3175 mL / NET: -2685 mL            JVP: Normal  Neck: supple  Lung: clear   CV: S1 S2 , Murmur:  Abd: soft  Ext: No edema  neuro: Awake / alert  Psych: flat affect      LABS/DATA:    CARDIAC MARKERS:                                9.5    11.33 )-----------( 338      ( 25 Nov 2023 05:20 )             31.4     11-25    137  |  103  |  8   ----------------------------<  203<H>  3.5   |  25  |  0.50    Ca    7.8<L>      25 Nov 2023 05:19      proBNP:   Lipid Profile:   HgA1c:   TSH:     TELE:  EKG:

## 2023-11-25 NOTE — PROGRESS NOTE ADULT - ASSESSMENT
63 M pmhx htn, dm, hld presents to ED from rehab for acute limb ischemia.    Acute Limb  Ischemia   CTA A/P with b/l LE run-noff significant for right external iliac artery occlusion with no reconstitution of flow to the distal RLE. 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     s/p BKA   Repeat Cx  bacteremia  Continue Vancomycin as per ID     Vascular Podiatry following   Dnetal eval appreciated   Tooth extraction before MARINO       Wound Care consult appreciated   Cards eval appreciated     OLIVER Contrats induced    [place]   Urine retention       DM HISS   A1C     HTN Home meds   Planning for formalization next week  MARINO       Psych consult

## 2023-11-25 NOTE — PROGRESS NOTE ADULT - SUBJECTIVE AND OBJECTIVE BOX
Patient is a 63y old  Male who presents with a chief complaint of Rt foot pain (19 Nov 2023 08:24)      SUBJECTIVE / OVERNIGHT EVENTS: no events   s/p BKA     T(C): 37.4 (11-25-23 @ 20:34), Max: 37.4 (11-25-23 @ 13:55)  HR: 86 (11-25-23 @ 20:34) (86 - 95)  BP: 149/87 (11-25-23 @ 20:34) (134/79 - 163/89)  RR: 18 (11-25-23 @ 20:34) (18 - 18)  SpO2: 95% (11-25-23 @ 20:34) (95% - 96%)      MEDICATIONS  (STANDING):  amLODIPine   Tablet 5 milliGRAM(s) Oral daily  chlorhexidine 2% Cloths 1 Application(s) Topical daily  dextrose 5%. 1000 milliLiter(s) (50 mL/Hr) IV Continuous <Continuous>  dextrose 5%. 1000 milliLiter(s) (100 mL/Hr) IV Continuous <Continuous>  dextrose 50% Injectable 12.5 Gram(s) IV Push once  dextrose 50% Injectable 25 Gram(s) IV Push once  dextrose 50% Injectable 25 Gram(s) IV Push once  gabapentin 300 milliGRAM(s) Oral three times a day  glucagon  Injectable 1 milliGRAM(s) IntraMuscular once  influenza   Vaccine 0.5 milliLiter(s) IntraMuscular once  insulin lispro (ADMELOG) corrective regimen sliding scale   SubCutaneous at bedtime  insulin lispro (ADMELOG) corrective regimen sliding scale   SubCutaneous three times a day before meals  lisinopril 5 milliGRAM(s) Oral daily  vancomycin  IVPB 1250 milliGRAM(s) IV Intermittent every 12 hours  vancomycin  IVPB        MEDICATIONS  (PRN):  acetaminophen     Tablet .. 650 milliGRAM(s) Oral every 6 hours PRN Temp greater or equal to 38C (100.4F)  dextrose Oral Gel 15 Gram(s) Oral once PRN Blood Glucose LESS THAN 70 milliGRAM(s)/deciliter  HYDROmorphone  Injectable 0.5 milliGRAM(s) IV Push every 3 hours PRN Moderate Pain (4 - 6)  ondansetron Injectable 4 milliGRAM(s) IV Push once PRN Nausea and/or Vomiting  oxyCODONE    IR 5 milliGRAM(s) Oral every 6 hours PRN Severe Pain (7 - 10)          PHYSICAL EXAM:  GENERAL: NAD, well-developed  HEAD:  Atraumatic, Normocephalic  EYES: EOMI,  conjunctiva and sclera clear  NECK: Supple, No JVD  CHEST/LUNG: Clear to auscultation bilaterally; No wheeze  HEART: Regular rate and rhythm; No murmurs, rubs, or gallops  ABDOMEN: Soft, Nontender, Nondistended; Bowel sounds present  EXTREMITIES:  s/p BKA   PSYCH: AAOx3  NEUROLOGY: non-focal  SKIN: No rashes or lesions                                                     9.5    11.33 )-----------( 338      ( 25 Nov 2023 05:20 )             31.4               137|103|8<203  3.5|25|0.50  7.8,--,--  11-25 @ 05:19  Imaging Personally Reviewed:    Consultant(s) Notes Reviewed:      Care Discussed with Consultants/Other Providers:

## 2023-11-25 NOTE — PROGRESS NOTE ADULT - ASSESSMENT
63M PMHx HTN, DM, HLD, presented to ED from rehab for acute limb ischemia. Patient was discharged about 1 week ago from OSH to rehab for conservative medical treatment of foot infection and later brought to ED for increased pain and acute discoloration of foot. Does not weightbear on right leg and has not ambulated in months. Patient now s/p R kiel ESPARZA 11/19, recovering appropriately.     Recommendations:  - DVT ppx  - Pain control: appreciate acute pain recs, adding gabapentin  - Plan for MARINO today; patient refusing dental evaluation yesterday and delaying MARINO  - f/u cx: 11/21 NGTD  - Wound care: daily dressing changes  - Premedicate before dressing changes as needed  - planning for formalization next week  - Remainder of care per primary team    Vascular Surgery   [id number]

## 2023-11-26 LAB
ANION GAP SERPL CALC-SCNC: 7 MMOL/L — SIGNIFICANT CHANGE UP (ref 5–17)
ANION GAP SERPL CALC-SCNC: 7 MMOL/L — SIGNIFICANT CHANGE UP (ref 5–17)
BUN SERPL-MCNC: 12 MG/DL — SIGNIFICANT CHANGE UP (ref 7–23)
BUN SERPL-MCNC: 12 MG/DL — SIGNIFICANT CHANGE UP (ref 7–23)
CALCIUM SERPL-MCNC: 7.8 MG/DL — LOW (ref 8.4–10.5)
CALCIUM SERPL-MCNC: 7.8 MG/DL — LOW (ref 8.4–10.5)
CHLORIDE SERPL-SCNC: 104 MMOL/L — SIGNIFICANT CHANGE UP (ref 96–108)
CHLORIDE SERPL-SCNC: 104 MMOL/L — SIGNIFICANT CHANGE UP (ref 96–108)
CO2 SERPL-SCNC: 26 MMOL/L — SIGNIFICANT CHANGE UP (ref 22–31)
CO2 SERPL-SCNC: 26 MMOL/L — SIGNIFICANT CHANGE UP (ref 22–31)
CREAT SERPL-MCNC: 0.5 MG/DL — SIGNIFICANT CHANGE UP (ref 0.5–1.3)
CREAT SERPL-MCNC: 0.5 MG/DL — SIGNIFICANT CHANGE UP (ref 0.5–1.3)
CULTURE RESULTS: SIGNIFICANT CHANGE UP
EGFR: 114 ML/MIN/1.73M2 — SIGNIFICANT CHANGE UP
EGFR: 114 ML/MIN/1.73M2 — SIGNIFICANT CHANGE UP
GLUCOSE BLDC GLUCOMTR-MCNC: 221 MG/DL — HIGH (ref 70–99)
GLUCOSE BLDC GLUCOMTR-MCNC: 221 MG/DL — HIGH (ref 70–99)
GLUCOSE BLDC GLUCOMTR-MCNC: 244 MG/DL — HIGH (ref 70–99)
GLUCOSE BLDC GLUCOMTR-MCNC: 244 MG/DL — HIGH (ref 70–99)
GLUCOSE BLDC GLUCOMTR-MCNC: 285 MG/DL — HIGH (ref 70–99)
GLUCOSE BLDC GLUCOMTR-MCNC: 285 MG/DL — HIGH (ref 70–99)
GLUCOSE BLDC GLUCOMTR-MCNC: 290 MG/DL — HIGH (ref 70–99)
GLUCOSE BLDC GLUCOMTR-MCNC: 290 MG/DL — HIGH (ref 70–99)
GLUCOSE SERPL-MCNC: 216 MG/DL — HIGH (ref 70–99)
GLUCOSE SERPL-MCNC: 216 MG/DL — HIGH (ref 70–99)
HCT VFR BLD CALC: 31.3 % — LOW (ref 39–50)
HCT VFR BLD CALC: 31.3 % — LOW (ref 39–50)
HGB BLD-MCNC: 9.4 G/DL — LOW (ref 13–17)
HGB BLD-MCNC: 9.4 G/DL — LOW (ref 13–17)
MCHC RBC-ENTMCNC: 24.6 PG — LOW (ref 27–34)
MCHC RBC-ENTMCNC: 24.6 PG — LOW (ref 27–34)
MCHC RBC-ENTMCNC: 30 GM/DL — LOW (ref 32–36)
MCHC RBC-ENTMCNC: 30 GM/DL — LOW (ref 32–36)
MCV RBC AUTO: 81.9 FL — SIGNIFICANT CHANGE UP (ref 80–100)
MCV RBC AUTO: 81.9 FL — SIGNIFICANT CHANGE UP (ref 80–100)
NRBC # BLD: 0 /100 WBCS — SIGNIFICANT CHANGE UP (ref 0–0)
NRBC # BLD: 0 /100 WBCS — SIGNIFICANT CHANGE UP (ref 0–0)
PLATELET # BLD AUTO: 277 K/UL — SIGNIFICANT CHANGE UP (ref 150–400)
PLATELET # BLD AUTO: 277 K/UL — SIGNIFICANT CHANGE UP (ref 150–400)
POTASSIUM SERPL-MCNC: 3.3 MMOL/L — LOW (ref 3.5–5.3)
POTASSIUM SERPL-MCNC: 3.3 MMOL/L — LOW (ref 3.5–5.3)
POTASSIUM SERPL-SCNC: 3.3 MMOL/L — LOW (ref 3.5–5.3)
POTASSIUM SERPL-SCNC: 3.3 MMOL/L — LOW (ref 3.5–5.3)
RBC # BLD: 3.82 M/UL — LOW (ref 4.2–5.8)
RBC # BLD: 3.82 M/UL — LOW (ref 4.2–5.8)
RBC # FLD: 17.7 % — HIGH (ref 10.3–14.5)
RBC # FLD: 17.7 % — HIGH (ref 10.3–14.5)
SODIUM SERPL-SCNC: 137 MMOL/L — SIGNIFICANT CHANGE UP (ref 135–145)
SODIUM SERPL-SCNC: 137 MMOL/L — SIGNIFICANT CHANGE UP (ref 135–145)
SPECIMEN SOURCE: SIGNIFICANT CHANGE UP
VANCOMYCIN TROUGH SERPL-MCNC: 11.8 UG/ML — SIGNIFICANT CHANGE UP (ref 10–20)
VANCOMYCIN TROUGH SERPL-MCNC: 11.8 UG/ML — SIGNIFICANT CHANGE UP (ref 10–20)
WBC # BLD: 10.02 K/UL — SIGNIFICANT CHANGE UP (ref 3.8–10.5)
WBC # BLD: 10.02 K/UL — SIGNIFICANT CHANGE UP (ref 3.8–10.5)
WBC # FLD AUTO: 10.02 K/UL — SIGNIFICANT CHANGE UP (ref 3.8–10.5)
WBC # FLD AUTO: 10.02 K/UL — SIGNIFICANT CHANGE UP (ref 3.8–10.5)

## 2023-11-26 RX ORDER — POTASSIUM CHLORIDE 20 MEQ
40 PACKET (EA) ORAL ONCE
Refills: 0 | Status: COMPLETED | OUTPATIENT
Start: 2023-11-26 | End: 2023-11-26

## 2023-11-26 RX ORDER — HYDROMORPHONE HYDROCHLORIDE 2 MG/ML
0.5 INJECTION INTRAMUSCULAR; INTRAVENOUS; SUBCUTANEOUS ONCE
Refills: 0 | Status: DISCONTINUED | OUTPATIENT
Start: 2023-11-26 | End: 2023-11-27

## 2023-11-26 RX ORDER — OXYCODONE HYDROCHLORIDE 5 MG/1
5 TABLET ORAL EVERY 6 HOURS
Refills: 0 | Status: DISCONTINUED | OUTPATIENT
Start: 2023-11-26 | End: 2023-11-30

## 2023-11-26 RX ADMIN — Medication 1: at 21:55

## 2023-11-26 RX ADMIN — GABAPENTIN 300 MILLIGRAM(S): 400 CAPSULE ORAL at 21:54

## 2023-11-26 RX ADMIN — Medication 2: at 17:33

## 2023-11-26 RX ADMIN — GABAPENTIN 300 MILLIGRAM(S): 400 CAPSULE ORAL at 13:17

## 2023-11-26 RX ADMIN — HYDROMORPHONE HYDROCHLORIDE 0.5 MILLIGRAM(S): 2 INJECTION INTRAMUSCULAR; INTRAVENOUS; SUBCUTANEOUS at 16:58

## 2023-11-26 RX ADMIN — LISINOPRIL 5 MILLIGRAM(S): 2.5 TABLET ORAL at 05:58

## 2023-11-26 RX ADMIN — OXYCODONE HYDROCHLORIDE 5 MILLIGRAM(S): 5 TABLET ORAL at 23:24

## 2023-11-26 RX ADMIN — OXYCODONE HYDROCHLORIDE 5 MILLIGRAM(S): 5 TABLET ORAL at 15:15

## 2023-11-26 RX ADMIN — CHLORHEXIDINE GLUCONATE 1 APPLICATION(S): 213 SOLUTION TOPICAL at 13:16

## 2023-11-26 RX ADMIN — HYDROMORPHONE HYDROCHLORIDE 0.5 MILLIGRAM(S): 2 INJECTION INTRAMUSCULAR; INTRAVENOUS; SUBCUTANEOUS at 10:10

## 2023-11-26 RX ADMIN — Medication 166.67 MILLIGRAM(S): at 18:24

## 2023-11-26 RX ADMIN — Medication 166.67 MILLIGRAM(S): at 06:01

## 2023-11-26 RX ADMIN — HYDROMORPHONE HYDROCHLORIDE 0.5 MILLIGRAM(S): 2 INJECTION INTRAMUSCULAR; INTRAVENOUS; SUBCUTANEOUS at 11:00

## 2023-11-26 RX ADMIN — HYDROMORPHONE HYDROCHLORIDE 0.5 MILLIGRAM(S): 2 INJECTION INTRAMUSCULAR; INTRAVENOUS; SUBCUTANEOUS at 17:50

## 2023-11-26 RX ADMIN — Medication 0.5 MILLIGRAM(S): at 10:10

## 2023-11-26 RX ADMIN — Medication 40 MILLIEQUIVALENT(S): at 13:17

## 2023-11-26 RX ADMIN — Medication 3: at 12:29

## 2023-11-26 RX ADMIN — GABAPENTIN 300 MILLIGRAM(S): 400 CAPSULE ORAL at 05:58

## 2023-11-26 RX ADMIN — Medication 2: at 09:33

## 2023-11-26 RX ADMIN — OXYCODONE HYDROCHLORIDE 5 MILLIGRAM(S): 5 TABLET ORAL at 14:36

## 2023-11-26 RX ADMIN — OXYCODONE HYDROCHLORIDE 5 MILLIGRAM(S): 5 TABLET ORAL at 00:27

## 2023-11-26 RX ADMIN — AMLODIPINE BESYLATE 5 MILLIGRAM(S): 2.5 TABLET ORAL at 05:58

## 2023-11-26 NOTE — PROGRESS NOTE ADULT - ASSESSMENT
63M PMHx HTN, DM, HLD, presented to ED from rehab for acute limb ischemia. Patient was discharged about 1 week ago from OSH to rehab for conservative medical treatment of foot infection and later brought to ED for increased pain and acute discoloration of foot. Does not weightbear on right leg and has not ambulated in months. Patient now s/p R guillotine BKA 11/19, recovering appropriately.     Recommendations:  - DVT ppx   - Pain control: appreciate acute pain recs   - Pt pending MARINO; patient previously refusing dental evaluation yesterday, now amenable. Reinforced importance of dental eval/treatment before MARINO before BKA formalization  - f/u cx: 11/21 NGTD  - Wound care: daily dressing changes  - Premedicate before dressing changes as needed  - Planning for formalization this week, please document clearances as optimized  - Remainder of care per primary team    Vascular Surgery   [id number]

## 2023-11-26 NOTE — PROGRESS NOTE ADULT - ASSESSMENT
Limb ischemia  bacteremia Gram positive   sepsis   OLIVER  sp BKA     empiric abx  has renal infarct  suspicion for IE  echo limited  awaiting MARINO pending dental extraction   fu with ID and renal   fu with vascular surgery

## 2023-11-26 NOTE — PROGRESS NOTE ADULT - SUBJECTIVE AND OBJECTIVE BOX
Patient is a 63y old  Male who presents with a chief complaint of Rt foot pain (19 Nov 2023 08:24)      SUBJECTIVE / OVERNIGHT EVENTS: no events   s/p BKA     T(C): 36.7 (11-26-23 @ 09:50), Max: 36.8 (11-26-23 @ 05:30)  HR: 75 (11-26-23 @ 09:50) (71 - 75)  BP: 143/89 (11-26-23 @ 09:50) (143/89 - 161/86)  RR: 18 (11-26-23 @ 09:50) (18 - 18)  SpO2: 96% (11-26-23 @ 09:50) (96% - 96%)    MEDICATIONS  (STANDING):  amLODIPine   Tablet 5 milliGRAM(s) Oral daily  chlorhexidine 2% Cloths 1 Application(s) Topical daily  dextrose 5%. 1000 milliLiter(s) (50 mL/Hr) IV Continuous <Continuous>  dextrose 5%. 1000 milliLiter(s) (100 mL/Hr) IV Continuous <Continuous>  dextrose 50% Injectable 25 Gram(s) IV Push once  dextrose 50% Injectable 25 Gram(s) IV Push once  dextrose 50% Injectable 12.5 Gram(s) IV Push once  gabapentin 300 milliGRAM(s) Oral three times a day  glucagon  Injectable 1 milliGRAM(s) IntraMuscular once  HYDROmorphone  Injectable 0.5 milliGRAM(s) IV Push once  influenza   Vaccine 0.5 milliLiter(s) IntraMuscular once  insulin lispro (ADMELOG) corrective regimen sliding scale   SubCutaneous three times a day before meals  insulin lispro (ADMELOG) corrective regimen sliding scale   SubCutaneous at bedtime  lisinopril 5 milliGRAM(s) Oral daily  vancomycin  IVPB      vancomycin  IVPB 1250 milliGRAM(s) IV Intermittent every 12 hours    MEDICATIONS  (PRN):  acetaminophen     Tablet .. 650 milliGRAM(s) Oral every 6 hours PRN Temp greater or equal to 38C (100.4F)  dextrose Oral Gel 15 Gram(s) Oral once PRN Blood Glucose LESS THAN 70 milliGRAM(s)/deciliter  HYDROmorphone  Injectable 0.5 milliGRAM(s) IV Push every 3 hours PRN Moderate Pain (4 - 6)  ondansetron Injectable 4 milliGRAM(s) IV Push once PRN Nausea and/or Vomiting  oxyCODONE    IR 5 milliGRAM(s) Oral every 6 hours PRN Severe Pain (7 - 10)        PHYSICAL EXAM:  GENERAL: NAD, well-developed  HEAD:  Atraumatic, Normocephalic  EYES: EOMI,  conjunctiva and sclera clear  NECK: Supple, No JVD  CHEST/LUNG: Clear to auscultation bilaterally; No wheeze  HEART: Regular rate and rhythm; No murmurs, rubs, or gallops  ABDOMEN: Soft, Nontender, Nondistended; Bowel sounds present  EXTREMITIES:  s/p BKA   PSYCH: AAOx3  NEUROLOGY: non-focal  SKIN: No rashes or lesions                                                  9.4    10.02 )-----------( 277      ( 26 Nov 2023 07:27 )             31.3               137|104|12<216  3.3|26|0.50  7.8,--,--  11-26 @ 07:24    Consultant(s) Notes Reviewed:      Care Discussed with Consultants/Other Providers:

## 2023-11-26 NOTE — PROGRESS NOTE ADULT - SUBJECTIVE AND OBJECTIVE BOX
SURGERY DAILY PROGRESS NOTE    SUBJECTIVE: No acute events overnight. Patient seen on AM rounds, returned later in the morning for examination and dressing change. Patient reports continued pain with manipulation of the RLE and states he is unable to move it, no new complaints.      OBJECTIVE:  Vital Signs Last 24 Hrs  T(C): 36.7 (26 Nov 2023 09:50), Max: 37.4 (25 Nov 2023 13:55)  T(F): 98.1 (26 Nov 2023 09:50), Max: 99.4 (25 Nov 2023 20:34)  HR: 75 (26 Nov 2023 09:50) (71 - 95)  BP: 143/89 (26 Nov 2023 09:50) (134/79 - 163/89)  BP(mean): --  RR: 18 (26 Nov 2023 09:50) (18 - 18)  SpO2: 96% (26 Nov 2023 09:50) (95% - 97%)    Parameters below as of 26 Nov 2023 09:50  Patient On (Oxygen Delivery Method): room air        I&O's Summary    25 Nov 2023 07:01  -  26 Nov 2023 07:00  --------------------------------------------------------  IN: 1020 mL / OUT: 2150 mL / NET: -1130 mL        Physical Exam:  General Appearance: Appears well, NAD. Anxious  Chest: Nonlabored breathing on RA  CV: Hemodynamically stable  Extremities: RLE s/p BKA, dressing C/D/I. LLE with SCD and Z flow boot  Vascular: Extremities WWP. BKA dressing removed, underlying stump site clean without active bleeding or purulence. Eschar proximal to lateral wound edge. Fresh dressing applied    LABS:                        9.4    10.02 )-----------( 277      ( 26 Nov 2023 07:27 )             31.3     11-26    137  |  104  |  12  ----------------------------<  216<H>  3.3<L>   |  26  |  0.50    Ca    7.8<L>      26 Nov 2023 07:24        Urinalysis Basic - ( 26 Nov 2023 07:24 )    Color: x / Appearance: x / SG: x / pH: x  Gluc: 216 mg/dL / Ketone: x  / Bili: x / Urobili: x   Blood: x / Protein: x / Nitrite: x   Leuk Esterase: x / RBC: x / WBC x   Sq Epi: x / Non Sq Epi: x / Bacteria: x

## 2023-11-26 NOTE — PROGRESS NOTE ADULT - SUBJECTIVE AND OBJECTIVE BOX
Subjective: Patient seen and examined. No new events except as noted.     SUBJECTIVE/ROS:        MEDICATIONS:  MEDICATIONS  (STANDING):  amLODIPine   Tablet 5 milliGRAM(s) Oral daily  chlorhexidine 2% Cloths 1 Application(s) Topical daily  dextrose 5%. 1000 milliLiter(s) (50 mL/Hr) IV Continuous <Continuous>  dextrose 5%. 1000 milliLiter(s) (100 mL/Hr) IV Continuous <Continuous>  dextrose 50% Injectable 25 Gram(s) IV Push once  dextrose 50% Injectable 25 Gram(s) IV Push once  dextrose 50% Injectable 12.5 Gram(s) IV Push once  gabapentin 300 milliGRAM(s) Oral three times a day  glucagon  Injectable 1 milliGRAM(s) IntraMuscular once  HYDROmorphone  Injectable 0.5 milliGRAM(s) IV Push once  influenza   Vaccine 0.5 milliLiter(s) IntraMuscular once  insulin lispro (ADMELOG) corrective regimen sliding scale   SubCutaneous three times a day before meals  insulin lispro (ADMELOG) corrective regimen sliding scale   SubCutaneous at bedtime  lisinopril 5 milliGRAM(s) Oral daily  vancomycin  IVPB 1250 milliGRAM(s) IV Intermittent every 12 hours  vancomycin  IVPB          PHYSICAL EXAM:  T(C): 36.8 (11-26-23 @ 05:30), Max: 37.4 (11-25-23 @ 13:55)  HR: 71 (11-26-23 @ 05:30) (71 - 101)  BP: 161/86 (11-26-23 @ 05:30) (134/79 - 163/89)  RR: 18 (11-26-23 @ 05:30) (18 - 18)  SpO2: 96% (11-26-23 @ 05:30) (92% - 97%)  Wt(kg): --  I&O's Summary    25 Nov 2023 07:01  -  26 Nov 2023 07:00  --------------------------------------------------------  IN: 1020 mL / OUT: 2150 mL / NET: -1130 mL            JVP: Normal  Neck: supple  Lung: clear   CV: S1 S2 , Murmur:  Abd: soft  Ext: No edema  neuro: Awake / alert  Psych: flat affect  Skin: normal``    LABS/DATA:    CARDIAC MARKERS:                                9.4    10.02 )-----------( 277      ( 26 Nov 2023 07:27 )             31.3     11-25    137  |  103  |  8   ----------------------------<  203<H>  3.5   |  25  |  0.50    Ca    7.8<L>      25 Nov 2023 05:19      proBNP:   Lipid Profile:   HgA1c:   TSH:     TELE:  EKG:

## 2023-11-26 NOTE — PROGRESS NOTE ADULT - SUBJECTIVE AND OBJECTIVE BOX
Please refer to previous dental consult note for additional information.       MEDICATIONS  (STANDING):  amLODIPine   Tablet 5 milliGRAM(s) Oral daily  chlorhexidine 2% Cloths 1 Application(s) Topical daily  dextrose 5%. 1000 milliLiter(s) (50 mL/Hr) IV Continuous <Continuous>  dextrose 5%. 1000 milliLiter(s) (100 mL/Hr) IV Continuous <Continuous>  dextrose 50% Injectable 25 Gram(s) IV Push once  dextrose 50% Injectable 25 Gram(s) IV Push once  dextrose 50% Injectable 12.5 Gram(s) IV Push once  gabapentin 300 milliGRAM(s) Oral three times a day  glucagon  Injectable 1 milliGRAM(s) IntraMuscular once  HYDROmorphone  Injectable 0.5 milliGRAM(s) IV Push once  influenza   Vaccine 0.5 milliLiter(s) IntraMuscular once  insulin lispro (ADMELOG) corrective regimen sliding scale   SubCutaneous three times a day before meals  insulin lispro (ADMELOG) corrective regimen sliding scale   SubCutaneous at bedtime  lisinopril 5 milliGRAM(s) Oral daily  vancomycin  IVPB      vancomycin  IVPB 1250 milliGRAM(s) IV Intermittent every 12 hours    MEDICATIONS  (PRN):  acetaminophen     Tablet .. 650 milliGRAM(s) Oral every 6 hours PRN Temp greater or equal to 38C (100.4F)  dextrose Oral Gel 15 Gram(s) Oral once PRN Blood Glucose LESS THAN 70 milliGRAM(s)/deciliter  HYDROmorphone  Injectable 0.5 milliGRAM(s) IV Push every 3 hours PRN Moderate Pain (4 - 6)  ondansetron Injectable 4 milliGRAM(s) IV Push once PRN Nausea and/or Vomiting  oxyCODONE    IR 5 milliGRAM(s) Oral every 6 hours PRN Severe Pain (7 - 10)    Allergies    penicillin (Anaphylaxis)    PROCEDURE:  Verbal and written consent given.  Anesthesia: 1 carpule of 4% articaine with epi 1:100,000 (1.7 ml) via mandibular infiltration.    Verbal and written consent given by patient. Medical clearance for extraction procedure in [place].  Extractions of #25 under local anesthesia with epinephrine without replacement. Topical 20% Benzocaine applied in area of #25. 1 carpule of 4% articaine with epi 1:100,000 (1.7 ml) administered via mandibular infiltration. Extracted #25 with rongeur. Root fully visualized after extraction. Socket curetted. Irrigated area with saline solution. One resorbable 3-0 chromic gut suture placed. Hemostasis obtained in area with gauze.  POIG.     RECOMMENDATIONS:  1.) Patient dentally optimized for MARINO procedure.  2.) If bleeding returns, apply direct pressure with gauze soaked in TXA.  3.) Pain meds per med team.  4.) 1 day dental F/U for extractions # 25.  5.) 1 week dental F/U for extractions # 25.   6.) F/U with outpatient dentist or [facility] ([phone]) for comprehensive dental care upon discharge.    Carl Lehman DDS #[id number]

## 2023-11-26 NOTE — PROGRESS NOTE ADULT - ASSESSMENT
63 M pmhx htn, dm, hld presents to ED from rehab for acute limb ischemia.    Acute Limb  Ischemia   CTA A/P with b/l LE run-noff significant for right external iliac artery occlusion with no reconstitution of flow to the distal RLE. 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     s/p BKA   Repeat Cx  bacteremia  Continue Vancomycin as per ID     Vascular Podiatry following   Dnetal eval appreciated   Tooth extraction before MARINO   Patient for tooth extraction today   Patient medically optimized for the procedure       Wound Care consult appreciated   Cards eval appreciated     OLIVER Contrats induced    [place]   Urine retention       DM HISS   A1C     HTN Home meds   Planning for formalization next week  MARINO       Psych consult

## 2023-11-27 LAB
ANION GAP SERPL CALC-SCNC: 7 MMOL/L — SIGNIFICANT CHANGE UP (ref 5–17)
ANION GAP SERPL CALC-SCNC: 7 MMOL/L — SIGNIFICANT CHANGE UP (ref 5–17)
BUN SERPL-MCNC: 14 MG/DL — SIGNIFICANT CHANGE UP (ref 7–23)
BUN SERPL-MCNC: 14 MG/DL — SIGNIFICANT CHANGE UP (ref 7–23)
CALCIUM SERPL-MCNC: 8 MG/DL — LOW (ref 8.4–10.5)
CALCIUM SERPL-MCNC: 8 MG/DL — LOW (ref 8.4–10.5)
CHLORIDE SERPL-SCNC: 102 MMOL/L — SIGNIFICANT CHANGE UP (ref 96–108)
CHLORIDE SERPL-SCNC: 102 MMOL/L — SIGNIFICANT CHANGE UP (ref 96–108)
CO2 SERPL-SCNC: 26 MMOL/L — SIGNIFICANT CHANGE UP (ref 22–31)
CO2 SERPL-SCNC: 26 MMOL/L — SIGNIFICANT CHANGE UP (ref 22–31)
CREAT SERPL-MCNC: 0.54 MG/DL — SIGNIFICANT CHANGE UP (ref 0.5–1.3)
CREAT SERPL-MCNC: 0.54 MG/DL — SIGNIFICANT CHANGE UP (ref 0.5–1.3)
EGFR: 111 ML/MIN/1.73M2 — SIGNIFICANT CHANGE UP
EGFR: 111 ML/MIN/1.73M2 — SIGNIFICANT CHANGE UP
GLUCOSE BLDC GLUCOMTR-MCNC: 208 MG/DL — HIGH (ref 70–99)
GLUCOSE BLDC GLUCOMTR-MCNC: 208 MG/DL — HIGH (ref 70–99)
GLUCOSE BLDC GLUCOMTR-MCNC: 229 MG/DL — HIGH (ref 70–99)
GLUCOSE BLDC GLUCOMTR-MCNC: 229 MG/DL — HIGH (ref 70–99)
GLUCOSE BLDC GLUCOMTR-MCNC: 233 MG/DL — HIGH (ref 70–99)
GLUCOSE BLDC GLUCOMTR-MCNC: 233 MG/DL — HIGH (ref 70–99)
GLUCOSE BLDC GLUCOMTR-MCNC: 264 MG/DL — HIGH (ref 70–99)
GLUCOSE BLDC GLUCOMTR-MCNC: 264 MG/DL — HIGH (ref 70–99)
GLUCOSE SERPL-MCNC: 258 MG/DL — HIGH (ref 70–99)
GLUCOSE SERPL-MCNC: 258 MG/DL — HIGH (ref 70–99)
HCT VFR BLD CALC: 30.3 % — LOW (ref 39–50)
HCT VFR BLD CALC: 30.3 % — LOW (ref 39–50)
HGB BLD-MCNC: 9.1 G/DL — LOW (ref 13–17)
HGB BLD-MCNC: 9.1 G/DL — LOW (ref 13–17)
MAGNESIUM SERPL-MCNC: 1.7 MG/DL — SIGNIFICANT CHANGE UP (ref 1.6–2.6)
MAGNESIUM SERPL-MCNC: 1.7 MG/DL — SIGNIFICANT CHANGE UP (ref 1.6–2.6)
MCHC RBC-ENTMCNC: 24.5 PG — LOW (ref 27–34)
MCHC RBC-ENTMCNC: 24.5 PG — LOW (ref 27–34)
MCHC RBC-ENTMCNC: 30 GM/DL — LOW (ref 32–36)
MCHC RBC-ENTMCNC: 30 GM/DL — LOW (ref 32–36)
MCV RBC AUTO: 81.5 FL — SIGNIFICANT CHANGE UP (ref 80–100)
MCV RBC AUTO: 81.5 FL — SIGNIFICANT CHANGE UP (ref 80–100)
NRBC # BLD: 0 /100 WBCS — SIGNIFICANT CHANGE UP (ref 0–0)
NRBC # BLD: 0 /100 WBCS — SIGNIFICANT CHANGE UP (ref 0–0)
PLATELET # BLD AUTO: 228 K/UL — SIGNIFICANT CHANGE UP (ref 150–400)
PLATELET # BLD AUTO: 228 K/UL — SIGNIFICANT CHANGE UP (ref 150–400)
POTASSIUM SERPL-MCNC: 4.1 MMOL/L — SIGNIFICANT CHANGE UP (ref 3.5–5.3)
POTASSIUM SERPL-MCNC: 4.1 MMOL/L — SIGNIFICANT CHANGE UP (ref 3.5–5.3)
POTASSIUM SERPL-SCNC: 4.1 MMOL/L — SIGNIFICANT CHANGE UP (ref 3.5–5.3)
POTASSIUM SERPL-SCNC: 4.1 MMOL/L — SIGNIFICANT CHANGE UP (ref 3.5–5.3)
RBC # BLD: 3.72 M/UL — LOW (ref 4.2–5.8)
RBC # BLD: 3.72 M/UL — LOW (ref 4.2–5.8)
RBC # FLD: 17.7 % — HIGH (ref 10.3–14.5)
RBC # FLD: 17.7 % — HIGH (ref 10.3–14.5)
SODIUM SERPL-SCNC: 135 MMOL/L — SIGNIFICANT CHANGE UP (ref 135–145)
SODIUM SERPL-SCNC: 135 MMOL/L — SIGNIFICANT CHANGE UP (ref 135–145)
WBC # BLD: 12.38 K/UL — HIGH (ref 3.8–10.5)
WBC # BLD: 12.38 K/UL — HIGH (ref 3.8–10.5)
WBC # FLD AUTO: 12.38 K/UL — HIGH (ref 3.8–10.5)
WBC # FLD AUTO: 12.38 K/UL — HIGH (ref 3.8–10.5)

## 2023-11-27 PROCEDURE — 93325 DOPPLER ECHO COLOR FLOW MAPG: CPT | Mod: 26

## 2023-11-27 PROCEDURE — 93312 ECHO TRANSESOPHAGEAL: CPT | Mod: 26

## 2023-11-27 PROCEDURE — 93320 DOPPLER ECHO COMPLETE: CPT | Mod: 26

## 2023-11-27 PROCEDURE — 76377 3D RENDER W/INTRP POSTPROCES: CPT | Mod: 26

## 2023-11-27 RX ORDER — VANCOMYCIN HCL 1 G
1250 VIAL (EA) INTRAVENOUS EVERY 12 HOURS
Refills: 0 | Status: DISCONTINUED | OUTPATIENT
Start: 2023-11-27 | End: 2023-11-30

## 2023-11-27 RX ADMIN — Medication 166.67 MILLIGRAM(S): at 15:52

## 2023-11-27 RX ADMIN — HYDROMORPHONE HYDROCHLORIDE 0.5 MILLIGRAM(S): 2 INJECTION INTRAMUSCULAR; INTRAVENOUS; SUBCUTANEOUS at 12:25

## 2023-11-27 RX ADMIN — LISINOPRIL 5 MILLIGRAM(S): 2.5 TABLET ORAL at 06:15

## 2023-11-27 RX ADMIN — CHLORHEXIDINE GLUCONATE 1 APPLICATION(S): 213 SOLUTION TOPICAL at 16:07

## 2023-11-27 RX ADMIN — Medication 3: at 17:30

## 2023-11-27 RX ADMIN — AMLODIPINE BESYLATE 5 MILLIGRAM(S): 2.5 TABLET ORAL at 06:15

## 2023-11-27 RX ADMIN — OXYCODONE HYDROCHLORIDE 5 MILLIGRAM(S): 5 TABLET ORAL at 23:15

## 2023-11-27 RX ADMIN — OXYCODONE HYDROCHLORIDE 5 MILLIGRAM(S): 5 TABLET ORAL at 00:24

## 2023-11-27 RX ADMIN — Medication 2: at 12:38

## 2023-11-27 RX ADMIN — GABAPENTIN 300 MILLIGRAM(S): 400 CAPSULE ORAL at 22:45

## 2023-11-27 RX ADMIN — GABAPENTIN 300 MILLIGRAM(S): 400 CAPSULE ORAL at 06:15

## 2023-11-27 RX ADMIN — GABAPENTIN 300 MILLIGRAM(S): 400 CAPSULE ORAL at 15:52

## 2023-11-27 RX ADMIN — HYDROMORPHONE HYDROCHLORIDE 0.5 MILLIGRAM(S): 2 INJECTION INTRAMUSCULAR; INTRAVENOUS; SUBCUTANEOUS at 13:00

## 2023-11-27 RX ADMIN — OXYCODONE HYDROCHLORIDE 5 MILLIGRAM(S): 5 TABLET ORAL at 22:45

## 2023-11-27 NOTE — PROGRESS NOTE ADULT - SUBJECTIVE AND OBJECTIVE BOX
Subjective: Patient seen and examined. No new events except as noted.     SUBJECTIVE/ROS:  MEDICATIONS:  MEDICATIONS  (STANDING):  amLODIPine   Tablet 5 milliGRAM(s) Oral daily  chlorhexidine 2% Cloths 1 Application(s) Topical daily  dextrose 5%. 1000 milliLiter(s) (50 mL/Hr) IV Continuous <Continuous>  dextrose 5%. 1000 milliLiter(s) (100 mL/Hr) IV Continuous <Continuous>  dextrose 50% Injectable 25 Gram(s) IV Push once  dextrose 50% Injectable 25 Gram(s) IV Push once  dextrose 50% Injectable 12.5 Gram(s) IV Push once  gabapentin 300 milliGRAM(s) Oral three times a day  glucagon  Injectable 1 milliGRAM(s) IntraMuscular once  HYDROmorphone  Injectable 0.5 milliGRAM(s) IV Push once  influenza   Vaccine 0.5 milliLiter(s) IntraMuscular once  insulin lispro (ADMELOG) corrective regimen sliding scale   SubCutaneous at bedtime  insulin lispro (ADMELOG) corrective regimen sliding scale   SubCutaneous three times a day before meals  lisinopril 5 milliGRAM(s) Oral daily  vancomycin  IVPB 1250 milliGRAM(s) IV Intermittent every 12 hours      PHYSICAL EXAM:  T(C): 37.3 (11-27-23 @ 05:00), Max: 37.4 (11-26-23 @ 21:34)  HR: 81 (11-27-23 @ 05:00) (70 - 96)  BP: 160/85 (11-27-23 @ 05:00) (143/89 - 168/89)  RR: 18 (11-27-23 @ 05:00) (18 - 18)  SpO2: 94% (11-27-23 @ 05:00) (94% - 96%)  Wt(kg): --  I&O's Summary    26 Nov 2023 07:01  -  27 Nov 2023 07:00  --------------------------------------------------------  IN: 1040 mL / OUT: 3050 mL / NET: -2010 mL            JVP: Normal  Neck: supple  Lung: clear   CV: S1 S2 , Murmur:  Abd: soft  Ext: No edema  neuro: Awake / alert  Psych: flat affect      LABS/DATA:    CARDIAC MARKERS:                                9.1    12.38 )-----------( 228      ( 27 Nov 2023 07:12 )             30.3     11-27    135  |  102  |  14  ----------------------------<  258<H>  4.1   |  26  |  0.54    Ca    8.0<L>      27 Nov 2023 07:10  Mg     1.7     11-27      proBNP:   Lipid Profile:   HgA1c:   TSH:     TELE:  EKG:

## 2023-11-27 NOTE — PROGRESS NOTE ADULT - SUBJECTIVE AND OBJECTIVE BOX
Patient is a 63y old  Male who presents with a chief complaint of Rt foot pain (19 Nov 2023 08:24)      SUBJECTIVE / OVERNIGHT EVENTS: no events   s/p BKA     T(C): 36.7 (11-26-23 @ 09:50), Max: 36.8 (11-26-23 @ 05:30)  HR: 75 (11-26-23 @ 09:50) (71 - 75)  BP: 143/89 (11-26-23 @ 09:50) (143/89 - 161/86)  RR: 18 (11-26-23 @ 09:50) (18 - 18)  SpO2: 96% (11-26-23 @ 09:50) (96% - 96%)        MEDICATIONS  (STANDING):  amLODIPine   Tablet 5 milliGRAM(s) Oral daily  chlorhexidine 2% Cloths 1 Application(s) Topical daily  dextrose 5%. 1000 milliLiter(s) (50 mL/Hr) IV Continuous <Continuous>  dextrose 5%. 1000 milliLiter(s) (100 mL/Hr) IV Continuous <Continuous>  dextrose 50% Injectable 12.5 Gram(s) IV Push once  dextrose 50% Injectable 25 Gram(s) IV Push once  dextrose 50% Injectable 25 Gram(s) IV Push once  gabapentin 300 milliGRAM(s) Oral three times a day  glucagon  Injectable 1 milliGRAM(s) IntraMuscular once  influenza   Vaccine 0.5 milliLiter(s) IntraMuscular once  insulin lispro (ADMELOG) corrective regimen sliding scale   SubCutaneous at bedtime  insulin lispro (ADMELOG) corrective regimen sliding scale   SubCutaneous three times a day before meals  lisinopril 5 milliGRAM(s) Oral daily  vancomycin  IVPB 1250 milliGRAM(s) IV Intermittent every 12 hours    MEDICATIONS  (PRN):  acetaminophen     Tablet .. 650 milliGRAM(s) Oral every 6 hours PRN Temp greater or equal to 38C (100.4F)  dextrose Oral Gel 15 Gram(s) Oral once PRN Blood Glucose LESS THAN 70 milliGRAM(s)/deciliter  HYDROmorphone  Injectable 0.5 milliGRAM(s) IV Push every 3 hours PRN Moderate Pain (4 - 6)  ondansetron Injectable 4 milliGRAM(s) IV Push once PRN Nausea and/or Vomiting  oxyCODONE    IR 5 milliGRAM(s) Oral every 6 hours PRN Severe Pain (7 - 10)      PHYSICAL EXAM:  GENERAL: NAD, well-developed  HEAD:  Atraumatic, Normocephalic  EYES: EOMI,  conjunctiva and sclera clear  NECK: Supple, No JVD  CHEST/LUNG: Clear to auscultation bilaterally; No wheeze  HEART: Regular rate and rhythm; No murmurs, rubs, or gallops  ABDOMEN: Soft, Nontender, Nondistended; Bowel sounds present  EXTREMITIES:  s/p BKA   PSYCH: AAOx3  NEUROLOGY: non-focal  SKIN: No rashes or lesions                                                        9.1    12.38 )-----------( 228      ( 27 Nov 2023 07:12 )             30.3               135|102|14<258  4.1|26|0.54  8.0,1.7,--  11-27 @ 07:10    137|104|12<216  3.3|26|0.50  7.8,--,--  11-26 @ 07:24    Consultant(s) Notes Reviewed:      Care Discussed with Consultants/Other Providers:

## 2023-11-27 NOTE — PROGRESS NOTE ADULT - ASSESSMENT
63 M pmhx htn, dm, hld presents to ED from rehab for acute limb ischemia.    Acute Limb  Ischemia   CTA A/P with b/l LE run-noff significant for right external iliac artery occlusion with no reconstitution of flow to the distal RLE. 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     s/p BKA   Repeat Cx  bacteremia  Continue Vancomycin as per ID     Vascular Podiatry following   Dnetal eval appreciated   MARINO today       s/p Tooth extraction     Patient medically optimized for the procedure       Wound Care consult appreciated   Cards eval appreciated     OLIVER Contrats induced    [place]   Urine retention       DM HISS   A1C     HTN Home meds   Planning for formalization next week  MARINO       Psych consult

## 2023-11-27 NOTE — PROGRESS NOTE ADULT - SUBJECTIVE AND OBJECTIVE BOX
Please refer to previous dental consult note for additional information.     INTERVAL HPI/OVERNIGHT EVENTS: Extraction #25 performed 11/26/2023 prior to MARINO procedure.     MEDICATIONS  (STANDING):  amLODIPine   Tablet 5 milliGRAM(s) Oral daily  chlorhexidine 2% Cloths 1 Application(s) Topical daily  dextrose 5%. 1000 milliLiter(s) (50 mL/Hr) IV Continuous <Continuous>  dextrose 5%. 1000 milliLiter(s) (100 mL/Hr) IV Continuous <Continuous>  dextrose 50% Injectable 12.5 Gram(s) IV Push once  dextrose 50% Injectable 25 Gram(s) IV Push once  dextrose 50% Injectable 25 Gram(s) IV Push once  gabapentin 300 milliGRAM(s) Oral three times a day  glucagon  Injectable 1 milliGRAM(s) IntraMuscular once  influenza   Vaccine 0.5 milliLiter(s) IntraMuscular once  insulin lispro (ADMELOG) corrective regimen sliding scale   SubCutaneous at bedtime  insulin lispro (ADMELOG) corrective regimen sliding scale   SubCutaneous three times a day before meals  lisinopril 5 milliGRAM(s) Oral daily  vancomycin  IVPB 1250 milliGRAM(s) IV Intermittent every 12 hours    MEDICATIONS  (PRN):  acetaminophen     Tablet .. 650 milliGRAM(s) Oral every 6 hours PRN Temp greater or equal to 38C (100.4F)  dextrose Oral Gel 15 Gram(s) Oral once PRN Blood Glucose LESS THAN 70 milliGRAM(s)/deciliter  HYDROmorphone  Injectable 0.5 milliGRAM(s) IV Push every 3 hours PRN Moderate Pain (4 - 6)  ondansetron Injectable 4 milliGRAM(s) IV Push once PRN Nausea and/or Vomiting  oxyCODONE    IR 5 milliGRAM(s) Oral every 6 hours PRN Severe Pain (7 - 10)      Allergies  penicillin (Anaphylaxis)    Intolerances    CLINICAL EXAM:  Bedside clinical exam performed with patient’s verbal consent. Patient reports no pain or discomfort at current time. Soft tissue surrounding extraction site #25 is healing within normal limits. No signs of infection. No erythema, swelling, or purulence. Sutures remaining at site #25. Patient reports no discomfort upon palpation at this time. Informed patient dental would be back in a week to ensure continued proper healing.      RECOMMENDATIONS:  1) Patient dentally optimized for MARINO procedure.   2) 1 week dental F/U for extraction #25.  3) F/U with outpatient dentist or [facility] ([phone]) for comprehensive care upon discharge.    Dulce Bassett DDS [id number]

## 2023-11-27 NOTE — PACU DISCHARGE NOTE - COMMENTS
No anesthesia complications.  Patient cleared for discharge from PACU s/p anesthesia. Ultimate disposition from PACU per primary service.
No anesthesia complications.  Patient cleared for discharge from PACU s/p anesthesia. Ultimate disposition from PACU per primary service.

## 2023-11-27 NOTE — PROGRESS NOTE ADULT - ASSESSMENT
· Assessment	  63M PMHx HTN, DM, HLD, presented to ED from rehab for acute limb ischemia. Patient was discharged about 1 week ago from OSH to rehab for conservative medical treatment of foot infection and later brought to ED for increased pain and acute discoloration of foot. Does not weightbear on right leg and has not ambulated in months. Patient now s/p R kiel ESPARZA 11/19, recovering appropriately.     Recommendations:  - DVT ppx   - Pain control: appreciate acute pain recs   - MARINO today  -  f/u final wound cx  - Wound care: daily dressing changes  - Premedicate before dressing changes as needed  - Planning for formalization Thursday, please document clearances as optimized  - Remainder of care per primary team    Vascular Surgery   [id number]

## 2023-11-27 NOTE — PRE-ANESTHESIA EVALUATION ADULT - NSANTHAIRWAYFT_ENT_ALL_CORE
Gen: No acute distress  Pulm: breathing comfortably on room air  Cor: regular rate full neck extension, no limits to neck ROM; denies h/o c-spine injury or surgery

## 2023-11-27 NOTE — PROGRESS NOTE ADULT - SUBJECTIVE AND OBJECTIVE BOX
Vascular Surgery Progress Note  Patient is a 63y old  Male who presents with a chief complaint of Rt foot pain (27 Nov 2023 09:32)      INTERVAL EVENTS: No acute events overnight.  SUBJECTIVE: Patient seen and examined at bedside with surgical team, patient without complaints. Denies fever, chills, CP, SOB nausea, vomiting, abdominal pain.        OBJECTIVE:    Vital Signs Last 24 Hrs  T(C): 37.3 (27 Nov 2023 10:03), Max: 37.4 (26 Nov 2023 21:34)  T(F): 99.1 (27 Nov 2023 05:00), Max: 99.4 (26 Nov 2023 21:34)  HR: 81 (27 Nov 2023 10:03) (70 - 96)  BP: 160/85 (27 Nov 2023 10:03) (151/80 - 168/89)  BP(mean): 97 (27 Nov 2023 10:03) (97 - 97)  RR: 18 (27 Nov 2023 10:03) (18 - 18)  SpO2: 94% (27 Nov 2023 10:03) (94% - 96%)    Parameters below as of 27 Nov 2023 10:03    O2 Flow (L/min): 2  I&O's Detail    26 Nov 2023 07:01  -  27 Nov 2023 07:00  --------------------------------------------------------  IN:    Oral Fluid: 1040 mL  Total IN: 1040 mL    OUT:    Indwelling Catheter - Urethral (mL): 3050 mL  Total OUT: 3050 mL    Total NET: -2010 mL      27 Nov 2023 07:01  -  27 Nov 2023 10:17  --------------------------------------------------------  IN:  Total IN: 0 mL    OUT:    Indwelling Catheter - Urethral (mL): 1000 mL  Total OUT: 1000 mL    Total NET: -1000 mL      MEDICATIONS  (STANDING):  amLODIPine   Tablet 5 milliGRAM(s) Oral daily  chlorhexidine 2% Cloths 1 Application(s) Topical daily  dextrose 5%. 1000 milliLiter(s) (50 mL/Hr) IV Continuous <Continuous>  dextrose 5%. 1000 milliLiter(s) (100 mL/Hr) IV Continuous <Continuous>  dextrose 50% Injectable 12.5 Gram(s) IV Push once  dextrose 50% Injectable 25 Gram(s) IV Push once  dextrose 50% Injectable 25 Gram(s) IV Push once  gabapentin 300 milliGRAM(s) Oral three times a day  glucagon  Injectable 1 milliGRAM(s) IntraMuscular once  HYDROmorphone  Injectable 0.5 milliGRAM(s) IV Push once  influenza   Vaccine 0.5 milliLiter(s) IntraMuscular once  insulin lispro (ADMELOG) corrective regimen sliding scale   SubCutaneous at bedtime  insulin lispro (ADMELOG) corrective regimen sliding scale   SubCutaneous three times a day before meals  lisinopril 5 milliGRAM(s) Oral daily  vancomycin  IVPB 1250 milliGRAM(s) IV Intermittent every 12 hours    MEDICATIONS  (PRN):  acetaminophen     Tablet .. 650 milliGRAM(s) Oral every 6 hours PRN Temp greater or equal to 38C (100.4F)  dextrose Oral Gel 15 Gram(s) Oral once PRN Blood Glucose LESS THAN 70 milliGRAM(s)/deciliter  HYDROmorphone  Injectable 0.5 milliGRAM(s) IV Push every 3 hours PRN Moderate Pain (4 - 6)  ondansetron Injectable 4 milliGRAM(s) IV Push once PRN Nausea and/or Vomiting  oxyCODONE    IR 5 milliGRAM(s) Oral every 6 hours PRN Severe Pain (7 - 10)      PHYSICAL EXAM:  General Appearance: Appears well, NAD. Anxious  Chest: Nonlabored breathing on RA  CV: Hemodynamically stable  Extremities: RLE s/p BKA, dressing C/D/I. LLE with SCD and Z flow boot  Vascular: Extremities WWP. BKA dressing removed, underlying stump site clean without active bleeding or purulence. Eschar proximal to lateral wound edge. Fresh dressing applied    LABS:                        9.1    12.38 )-----------( 228      ( 27 Nov 2023 07:12 )             30.3     11-27    135  |  102  |  14  ----------------------------<  258<H>  4.1   |  26  |  0.54    Ca    8.0<L>      27 Nov 2023 07:10  Mg     1.7     11-27          Urinalysis Basic - ( 27 Nov 2023 07:10 )    Color: x / Appearance: x / SG: x / pH: x  Gluc: 258 mg/dL / Ketone: x  / Bili: x / Urobili: x   Blood: x / Protein: x / Nitrite: x   Leuk Esterase: x / RBC: x / WBC x   Sq Epi: x / Non Sq Epi: x / Bacteria: x          IMAGING:

## 2023-11-28 LAB
GLUCOSE BLDC GLUCOMTR-MCNC: 171 MG/DL — HIGH (ref 70–99)
GLUCOSE BLDC GLUCOMTR-MCNC: 171 MG/DL — HIGH (ref 70–99)
GLUCOSE BLDC GLUCOMTR-MCNC: 233 MG/DL — HIGH (ref 70–99)
GLUCOSE BLDC GLUCOMTR-MCNC: 233 MG/DL — HIGH (ref 70–99)
GLUCOSE BLDC GLUCOMTR-MCNC: 238 MG/DL — HIGH (ref 70–99)
GLUCOSE BLDC GLUCOMTR-MCNC: 238 MG/DL — HIGH (ref 70–99)
GLUCOSE BLDC GLUCOMTR-MCNC: 318 MG/DL — HIGH (ref 70–99)
GLUCOSE BLDC GLUCOMTR-MCNC: 318 MG/DL — HIGH (ref 70–99)
HCT VFR BLD CALC: 30.5 % — LOW (ref 39–50)
HCT VFR BLD CALC: 30.5 % — LOW (ref 39–50)
HGB BLD-MCNC: 9.3 G/DL — LOW (ref 13–17)
HGB BLD-MCNC: 9.3 G/DL — LOW (ref 13–17)
MCHC RBC-ENTMCNC: 24.8 PG — LOW (ref 27–34)
MCHC RBC-ENTMCNC: 24.8 PG — LOW (ref 27–34)
MCHC RBC-ENTMCNC: 30.5 GM/DL — LOW (ref 32–36)
MCHC RBC-ENTMCNC: 30.5 GM/DL — LOW (ref 32–36)
MCV RBC AUTO: 81.3 FL — SIGNIFICANT CHANGE UP (ref 80–100)
MCV RBC AUTO: 81.3 FL — SIGNIFICANT CHANGE UP (ref 80–100)
NRBC # BLD: 0 /100 WBCS — SIGNIFICANT CHANGE UP (ref 0–0)
NRBC # BLD: 0 /100 WBCS — SIGNIFICANT CHANGE UP (ref 0–0)
PLATELET # BLD AUTO: 194 K/UL — SIGNIFICANT CHANGE UP (ref 150–400)
PLATELET # BLD AUTO: 194 K/UL — SIGNIFICANT CHANGE UP (ref 150–400)
RBC # BLD: 3.75 M/UL — LOW (ref 4.2–5.8)
RBC # BLD: 3.75 M/UL — LOW (ref 4.2–5.8)
RBC # FLD: 17.8 % — HIGH (ref 10.3–14.5)
RBC # FLD: 17.8 % — HIGH (ref 10.3–14.5)
VANCOMYCIN TROUGH SERPL-MCNC: 11.9 UG/ML — SIGNIFICANT CHANGE UP (ref 10–20)
VANCOMYCIN TROUGH SERPL-MCNC: 11.9 UG/ML — SIGNIFICANT CHANGE UP (ref 10–20)
WBC # BLD: 11.76 K/UL — HIGH (ref 3.8–10.5)
WBC # BLD: 11.76 K/UL — HIGH (ref 3.8–10.5)
WBC # FLD AUTO: 11.76 K/UL — HIGH (ref 3.8–10.5)
WBC # FLD AUTO: 11.76 K/UL — HIGH (ref 3.8–10.5)

## 2023-11-28 PROCEDURE — 99232 SBSQ HOSP IP/OBS MODERATE 35: CPT

## 2023-11-28 RX ORDER — HYDROMORPHONE HYDROCHLORIDE 2 MG/ML
0.2 INJECTION INTRAMUSCULAR; INTRAVENOUS; SUBCUTANEOUS ONCE
Refills: 0 | Status: DISCONTINUED | OUTPATIENT
Start: 2023-11-28 | End: 2023-11-28

## 2023-11-28 RX ADMIN — HYDROMORPHONE HYDROCHLORIDE 0.5 MILLIGRAM(S): 2 INJECTION INTRAMUSCULAR; INTRAVENOUS; SUBCUTANEOUS at 08:27

## 2023-11-28 RX ADMIN — GABAPENTIN 300 MILLIGRAM(S): 400 CAPSULE ORAL at 11:18

## 2023-11-28 RX ADMIN — CHLORHEXIDINE GLUCONATE 1 APPLICATION(S): 213 SOLUTION TOPICAL at 11:19

## 2023-11-28 RX ADMIN — LISINOPRIL 5 MILLIGRAM(S): 2.5 TABLET ORAL at 06:12

## 2023-11-28 RX ADMIN — HYDROMORPHONE HYDROCHLORIDE 0.5 MILLIGRAM(S): 2 INJECTION INTRAMUSCULAR; INTRAVENOUS; SUBCUTANEOUS at 13:00

## 2023-11-28 RX ADMIN — Medication 2: at 17:37

## 2023-11-28 RX ADMIN — HYDROMORPHONE HYDROCHLORIDE 0.5 MILLIGRAM(S): 2 INJECTION INTRAMUSCULAR; INTRAVENOUS; SUBCUTANEOUS at 12:03

## 2023-11-28 RX ADMIN — Medication 166.67 MILLIGRAM(S): at 06:11

## 2023-11-28 RX ADMIN — Medication 4: at 12:48

## 2023-11-28 RX ADMIN — GABAPENTIN 300 MILLIGRAM(S): 400 CAPSULE ORAL at 06:14

## 2023-11-28 RX ADMIN — HYDROMORPHONE HYDROCHLORIDE 0.2 MILLIGRAM(S): 2 INJECTION INTRAMUSCULAR; INTRAVENOUS; SUBCUTANEOUS at 15:34

## 2023-11-28 RX ADMIN — AMLODIPINE BESYLATE 5 MILLIGRAM(S): 2.5 TABLET ORAL at 06:14

## 2023-11-28 RX ADMIN — HYDROMORPHONE HYDROCHLORIDE 0.2 MILLIGRAM(S): 2 INJECTION INTRAMUSCULAR; INTRAVENOUS; SUBCUTANEOUS at 14:28

## 2023-11-28 RX ADMIN — GABAPENTIN 300 MILLIGRAM(S): 400 CAPSULE ORAL at 22:07

## 2023-11-28 RX ADMIN — OXYCODONE HYDROCHLORIDE 5 MILLIGRAM(S): 5 TABLET ORAL at 22:08

## 2023-11-28 RX ADMIN — Medication 166.67 MILLIGRAM(S): at 16:35

## 2023-11-28 RX ADMIN — Medication 2: at 09:31

## 2023-11-28 RX ADMIN — OXYCODONE HYDROCHLORIDE 5 MILLIGRAM(S): 5 TABLET ORAL at 22:38

## 2023-11-28 RX ADMIN — Medication 0.5 MILLIGRAM(S): at 14:27

## 2023-11-28 RX ADMIN — HYDROMORPHONE HYDROCHLORIDE 0.5 MILLIGRAM(S): 2 INJECTION INTRAMUSCULAR; INTRAVENOUS; SUBCUTANEOUS at 09:44

## 2023-11-28 NOTE — PROGRESS NOTE ADULT - SUBJECTIVE AND OBJECTIVE BOX
Subjective: Patient seen and examined. No new events except as noted.     SUBJECTIVE/ROS:  feels ok       MEDICATIONS:  MEDICATIONS  (STANDING):  amLODIPine   Tablet 5 milliGRAM(s) Oral daily  chlorhexidine 2% Cloths 1 Application(s) Topical daily  dextrose 5%. 1000 milliLiter(s) (50 mL/Hr) IV Continuous <Continuous>  dextrose 5%. 1000 milliLiter(s) (100 mL/Hr) IV Continuous <Continuous>  dextrose 50% Injectable 25 Gram(s) IV Push once  dextrose 50% Injectable 25 Gram(s) IV Push once  dextrose 50% Injectable 12.5 Gram(s) IV Push once  gabapentin 300 milliGRAM(s) Oral three times a day  glucagon  Injectable 1 milliGRAM(s) IntraMuscular once  influenza   Vaccine 0.5 milliLiter(s) IntraMuscular once  insulin lispro (ADMELOG) corrective regimen sliding scale   SubCutaneous three times a day before meals  insulin lispro (ADMELOG) corrective regimen sliding scale   SubCutaneous at bedtime  lisinopril 5 milliGRAM(s) Oral daily  vancomycin  IVPB 1250 milliGRAM(s) IV Intermittent every 12 hours      PHYSICAL EXAM:  T(C): 36.7 (11-28-23 @ 06:20), Max: 37.3 (11-27-23 @ 10:03)  HR: 88 (11-28-23 @ 06:20) (69 - 88)  BP: 136/84 (11-28-23 @ 06:20) (131/76 - 163/89)  RR: 17 (11-28-23 @ 06:20) (12 - 18)  SpO2: 96% (11-28-23 @ 06:20) (94% - 100%)  Wt(kg): --  I&O's Summary    27 Nov 2023 07:01  -  28 Nov 2023 07:00  --------------------------------------------------------  IN: 540 mL / OUT: 3175 mL / NET: -2635 mL      Height (cm): 170.2 (11-27 @ 10:03)  Weight (kg): 64.7 (11-27 @ 10:03)  BMI (kg/m2): 22.3 (11-27 @ 10:03)  BSA (m2): 1.75 (11-27 @ 10:03)      JVP: Normal  Neck: supple  Lung: clear   CV: S1 S2 , Murmur:  Abd: soft  Ext: No edema  neuro: Awake / alert  Psych: flat affect  Skin: normal``    LABS/DATA:    CARDIAC MARKERS:                                9.3    11.76 )-----------( 194      ( 28 Nov 2023 07:24 )             30.5     11-27    135  |  102  |  14  ----------------------------<  258<H>  4.1   |  26  |  0.54    Ca    8.0<L>      27 Nov 2023 07:10  Mg     1.7     11-27      proBNP:   Lipid Profile:   HgA1c:   TSH:     TELE:  EKG:

## 2023-11-28 NOTE — PROGRESS NOTE ADULT - SUBJECTIVE AND OBJECTIVE BOX
SURGERY DAILY PROGRESS NOTE:     SUBJECTIVE/ROS: No acute events overnight. Patient seen and examined bedside on AM rounds.   Daily dressing changes. Plan for Thu, 11/30 R AKA formalization.     OBJECTIVE:  Vital Signs Last 24 Hrs  T(C): 37.1 (28 Nov 2023 14:00), Max: 37.2 (27 Nov 2023 17:06)  T(F): 98.7 (28 Nov 2023 14:00), Max: 98.9 (27 Nov 2023 17:06)  HR: 71 (28 Nov 2023 14:00) (69 - 88)  BP: 161/84 (28 Nov 2023 14:00) (136/84 - 173/92)  BP(mean): --  RR: 19 (28 Nov 2023 14:00) (17 - 19)  SpO2: 95% (28 Nov 2023 14:00) (95% - 96%)    Parameters below as of 28 Nov 2023 14:00  Patient On (Oxygen Delivery Method): room air        PHYSICAL EXAM:  General Appearance: Appears well, NAD. Anxious  Chest: Nonlabored breathing on RA  CV: Hemodynamically stable  Extremities: RLE s/p BKA, dressing C/D/I. LLE with SCD and Z flow boot  Vascular: Extremities WWP. BKA dressing removed, underlying stump site clean without active bleeding or purulence. Eschar proximal to lateral wound edge. Fresh dressing applied                          9.3    11.76 )-----------( 194      ( 28 Nov 2023 07:24 )             30.5     11-27    135  |  102  |  14  ----------------------------<  258<H>  4.1   |  26  |  0.54    Ca    8.0<L>      27 Nov 2023 07:10  Mg     1.7     11-27       I&O's Detail    27 Nov 2023 07:01  -  28 Nov 2023 07:00  --------------------------------------------------------  IN:    Oral Fluid: 540 mL  Total IN: 540 mL    OUT:    Indwelling Catheter - Urethral (mL): 3175 mL  Total OUT: 3175 mL    Total NET: -2635 mL      28 Nov 2023 07:01  -  28 Nov 2023 16:07  --------------------------------------------------------  IN:    Oral Fluid: 720 mL  Total IN: 720 mL    OUT:    Indwelling Catheter - Urethral (mL): 850 mL  Total OUT: 850 mL    Total NET: -130 mL    < from: MARINO W or WO Ultrasound Enhancing Agent (11.27.23 @ 10:23) >  TRANSESOPHAGEAL ECHOCARDIOGRAM REPORT  ________________________________________________________________________________                                      _______       Pt. Name:       AIXA LARSON Study Date:    11/27/2023  MRN:            [id number]       YOB: 1960  Accession #:    [id number]       Age:           63 years  Account#:       [id number]    Gender:        M  Heart Rate:                     Height:        67.00 in (170.18 cm)  Rhythm:                         Weight:        142.00 lb (64.41 kg)  Blood Pressure: 168/90 mmHg     BSA/BMI:       1.75 m² / 22.24 kg/m²  ________________________________________________________________________________________  Referring Physician:    [id number] Juany Rivas  Interpreting Physician: Julinae Feng MD  Fellow (Performing):    Dalton Jalloh MD  Fellow (Interpreting):  Dalton Jalloh MD    CPT:               ECHO 3D RECONSTRUCT W WORKSTATION - 43703.;ECHO TRANSESOPH                     W/O CON - 58008.m;DOPPLERECHO COMP W SPECT - 86374.m;DOPPL                     ECHO COLOR FLOW - 07906.m  Indication(s):     Endocarditis, valve unspecified - I38  Procedure:         Transesophageal echocardiogram performed with 2D, M-mode and                     complete spectral and color flow Doppler. Full volume                     3-dimensional reconstruction performed at the workstation.  Ordering Location: [facility]  Study Information: Image quality for this study is adequate.    _______________________________________________________________________________________     CONCLUSIONS:      1. Left ventricular wall thickness is moderately increased. Left ventricular systolic function is normal with an ejection fraction visually estimated at 55 to 60 %. There are no regional wall motion abnormalities seen.   2. Normal right ventricular cavity size, wall thickness, and systolic function.   3. Focal thickening seen on the tips of the non cusp. There is no aortic valve stenosis. There is mild aortic regurgitation.   4. There are degenerative changes of the valve such as thickened mitral and aortic valve leaflets. No obvious vegetation seen.   5. Small pericardial effusion.    ________________________________________________________________________________________  FINDINGS:     Left Ventricle:  Left ventricular wall thickness is moderately increased. Left ventricular systolic function is normal with an ejection fraction visually estimated at 55 to 60%. There are no regional wall motion abnormalities seen. There is no evidence of a left ventricular thrombus.     Right Ventricle:  The right ventricular cavity is normal in size, normal wall thickness and normal systolic function.     Left Atrium:  The left atrium is normal. There is no evidence of left atrialor left atrial appendage thrombus.     Right Atrium:  The right atrium is normal in size.     Interatrial Septum:  The interatrial septum appears intact.     Aortic Valve:  The aortic valve is tricuspid with normal leaflet excursion. There is mild thickening of the aortic valve leaflets. Focal thickening seen on the tips of the non cusp. There is no aortic valve stenosis. There is mild aortic regurgitation.     Mitral Valve:  Structurally normal mitral valve with normal leaflet excursion. There is mitral valve thickening of the anterior and posterior leaflets. There is no mitral valve stenosis. There is mild mitral regurgitation.     Tricuspid Valve:  Structurally normal tricuspid valve with normal leaflet excursion. There is trace tricuspid regurgitation.     Pulmonic Valve:  Structurally normal pulmonic valve with normal leaflet excursion. There is trace pulmonic regurgitation.     Aorta:  The aortic annulus and aortic root appear normal in size. There is mild atheroma in the visualizedportions of the proximal ascending aorta. There is moderate atheroma in the visualized portions of the transverse aortic arch. There is moderate atheroma in the visualized portions of the descending aorta.     Pericardium:  There is a small pericardial effusion.     Systemic Veins:  The inferior vena cava is normal in size (normal <2.1cm) with normal inspiratory collapse (normal >50%) consistent with normal right atrial pressure (~3, range 0-5mmHg).  ____________________________________________________________________    < end of copied text >

## 2023-11-28 NOTE — PROGRESS NOTE ADULT - ASSESSMENT
63 M pmhx htn, dm, hld presents to ED from rehab for acute limb ischemia.    Acute Limb  Ischemia   CTA A/P with b/l LE run-noff significant for right external iliac artery occlusion with no reconstitution of flow to the distal RLE. 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     s/p BKA   Repeat Cx  bacteremia  Continue Vancomycin as per ID   Last dose tomorrow     Vascular Podiatry following   Dnetal eval appreciated   MARINO no vegetation     s/p Tooth extraction     Patient medically optimized for the procedure       Wound Care consult appreciated   Cards eval appreciated     OLIVER Contrats induced    [place]   Urine retention       DM HISS   A1C     HTN Home meds   Planning for formalization   Patient medically optimized for the procedure

## 2023-11-28 NOTE — PROGRESS NOTE ADULT - SUBJECTIVE AND OBJECTIVE BOX
[facility]  Division of Infectious Diseases  [phone]    Name: AIXA LARSON  Age: 63y  Gender: Male  MRN: [id number]    Interval History--  Notes reviewed.     Past Medical History--  Hypertension    Diabetes        For details regarding the patient's social history, family history, and other miscellaneous elements, please refer the initial infectious diseases consultation and/or the admitting history and physical examination for this admission.    Allergies    penicillin (Anaphylaxis)    Intolerances        Medications--  Antibiotics:  vancomycin  IVPB 1250 milliGRAM(s) IV Intermittent every 12 hours    Immunologic:  influenza   Vaccine 0.5 milliLiter(s) IntraMuscular once    Other:  acetaminophen     Tablet .. PRN  amLODIPine   Tablet  chlorhexidine 2% Cloths  dextrose 5%.  dextrose 5%.  dextrose 50% Injectable  dextrose 50% Injectable  dextrose 50% Injectable  dextrose Oral Gel PRN  gabapentin  glucagon  Injectable  HYDROmorphone  Injectable PRN  insulin lispro (ADMELOG) corrective regimen sliding scale  insulin lispro (ADMELOG) corrective regimen sliding scale  lisinopril  ondansetron Injectable PRN  oxyCODONE    IR PRN      Review of Systems--  A 10-point review of systems was obtained.     Pertinent positives and negatives--  Constitutional: No fevers. No Chills. No Rigors.   Cardiovascular: No chest pain. No palpitations.  Respiratory: No shortness of breath. No cough.  Gastrointestinal: No nausea or vomiting. No diarrhea or constipation.   Psychiatric: Pleasant. Appropriate affect.    Review of systems otherwise negative except as previously noted.    Physical Examination--  Vital Signs: T(F): 98 (11-28-23 @ 06:20), Max: 99.1 (11-27-23 @ 13:40)  HR: 88 (11-28-23 @ 06:20)  BP: 136/84 (11-28-23 @ 06:20)  RR: 17 (11-28-23 @ 06:20)  SpO2: 96% (11-28-23 @ 06:20)  Wt(kg): --  General: Nontoxic-appearing Male in no acute distress.  HEENT: AT/NC. PERRL. EOMI. Anicteric. Conjunctiva pink and moist. Oropharynx clear. Dentition fair.  Neck: Not rigid. No sense of mass.  Nodes: None palpable.  Lungs: Clear bilaterally without rales, wheezing or rhonchi  Heart: Regular rate and rhythm. No Murmur. No rub. No gallop. No palpable thrill.  Abdomen: Bowel sounds present and normoactive. Soft. Nondistended. Nontender. No sense of mass. No organomegaly.  Back: No spinal tenderness. No costovertebral angle tenderness.   Extremities: No cyanosis or clubbing. No edema.   Skin: Warm. Dry. Good turgor. No rash. No vasculitic stigmata.  Psychiatric: Appropriate affect and mood for situation.         Laboratory Studies--  CBC                        9.3    11.76 )-----------( 194      ( 28 Nov 2023 07:24 )             30.5       Chemistries  11-27    135  |  102  |  14  ----------------------------<  258<H>  4.1   |  26  |  0.54    Ca    8.0<L>      27 Nov 2023 07:10  Mg     1.7     11-27        Culture Data           [facility]  Division of Infectious Diseases  [phone]    Name: AIXA LARSON  Age: 63y  Gender: Male  MRN: [id number]    Interval History--  Notes reviewed. Seen earlier this am. Feels ok. Anxious about rehab,   MARINO results noted.       Past Medical History--  Hypertension    Diabetes        For details regarding the patient's social history, family history, and other miscellaneous elements, please refer the initial infectious diseases consultation and/or the admitting history and physical examination for this admission.    Allergies    penicillin (Anaphylaxis)    Intolerances        Medications--  Antibiotics:  vancomycin  IVPB 1250 milliGRAM(s) IV Intermittent every 12 hours    Immunologic:  influenza   Vaccine 0.5 milliLiter(s) IntraMuscular once    Other:  acetaminophen     Tablet .. PRN  amLODIPine   Tablet  chlorhexidine 2% Cloths  dextrose 5%.  dextrose 5%.  dextrose 50% Injectable  dextrose 50% Injectable  dextrose 50% Injectable  dextrose Oral Gel PRN  gabapentin  glucagon  Injectable  HYDROmorphone  Injectable PRN  insulin lispro (ADMELOG) corrective regimen sliding scale  insulin lispro (ADMELOG) corrective regimen sliding scale  lisinopril  ondansetron Injectable PRN  oxyCODONE    IR PRN      Review of Systems--  A 10-point review of systems was obtained.   Review of systems otherwise unchanged compared to prior visit except as previously noted.    Physical Examination--  Vital Signs: T(F): 98 (11-28-23 @ 06:20), Max: 99.1 (11-27-23 @ 13:40)  HR: 88 (11-28-23 @ 06:20)  BP: 136/84 (11-28-23 @ 06:20)  RR: 17 (11-28-23 @ 06:20)  SpO2: 96% (11-28-23 @ 06:20)  Wt(kg): --  General: Nontoxic-appearing Male in no acute distress.  HEENT: Anicteric. Conjunctiva pink and moist. Oropharynx clear.  Neck: Not rigid. No sense of mass.  Nodes: None palpable.  Lungs: Diminished BS bilaterally without rales, wheezing or rhonchi  Heart: Regular rate and rhythm.   Abdomen: Bowel sounds present and normoactive. Soft. Nondistended. Nontender.   Extremities: RLE dressed. No C/C/E.   Psychiatric:: Grossly appropriate mood and affect.      Laboratory Studies--  CBC                        9.3    11.76 )-----------( 194      ( 28 Nov 2023 07:24 )             30.5       WBC Count: 12.38 K/uL (11-27-23 @ 07:12)  WBC Count: 10.02 K/uL (11-26-23 @ 07:27)  WBC Count: 11.33 K/uL (11-25-23 @ 05:20)  WBC Count: 10.62 K/uL (11-24-23 @ 06:55)  WBC Count: 8.87 K/uL (11-23-23 @ 07:17)  WBC Count: 8.93 K/uL (11-22-23 @ 07:02)  WBC Count: 11.05 K/uL (11-21-23 @ 08:24)  WBC Count: 13.42 K/uL (11-20-23 @ 07:59)  WBC Count: 18.72 K/uL (11-19-23 @ 04:31)  WBC Count: 17.78 K/uL (11-18-23 @ 07:10)  WBC Count: 16.53 K/uL (11-17-23 @ 06:51)  WBC Count: 13.91 K/uL (11-16-23 @ 07:43)  WBC Count: 9.25 K/uL (11-15-23 @ 13:20)      Chemistries  11-27    135  |  102  |  14  ----------------------------<  258<H>  4.1   |  26  |  0.54    Ca    8.0<L>      27 Nov 2023 07:10  Mg     1.7     11-27        Culture Data

## 2023-11-28 NOTE — PROGRESS NOTE ADULT - ASSESSMENT
63M PMHx HTN, DM, HLD, presented to ED from rehab for acute limb ischemia. Patient was discharged about 1 week ago from OSH to rehab for conservative medical treatment of foot infection and later brought to ED for increased pain and acute discoloration of foot. Does not weightbear on right leg and has not ambulated in months. Patient now s/p R kiel ESPARZA 11/19, recovering appropriately.     Recommendations:  - DVT ppx   - Pain control: appreciate acute pain recs   - Wound care: daily dressing changes  - Planning for formalization Thu, 11/30  - Please document cards and meds optimization/clearances for the OR  - Remainder of care per primary team    Vascular Surgery   [id number]

## 2023-11-28 NOTE — PROGRESS NOTE ADULT - SUBJECTIVE AND OBJECTIVE BOX
Patient is a 63y old  Male who presents with a chief complaint of Rt foot pain (19 Nov 2023 08:24)      SUBJECTIVE / OVERNIGHT EVENTS: no events   s/p BKA     T(C): 36.5 (11-28-23 @ 21:11), Max: 37.3 (11-28-23 @ 17:02)  HR: 91 (11-28-23 @ 21:11) (71 - 95)  BP: 160/87 (11-28-23 @ 21:11) (150/81 - 161/84)  RR: 18 (11-28-23 @ 21:11) (18 - 19)  SpO2: 92% (11-28-23 @ 21:11) (92% - 95%)      MEDICATIONS  (STANDING):  amLODIPine   Tablet 5 milliGRAM(s) Oral daily  chlorhexidine 2% Cloths 1 Application(s) Topical daily  dextrose 5%. 1000 milliLiter(s) (50 mL/Hr) IV Continuous <Continuous>  dextrose 5%. 1000 milliLiter(s) (100 mL/Hr) IV Continuous <Continuous>  dextrose 50% Injectable 12.5 Gram(s) IV Push once  dextrose 50% Injectable 25 Gram(s) IV Push once  dextrose 50% Injectable 25 Gram(s) IV Push once  gabapentin 300 milliGRAM(s) Oral three times a day  glucagon  Injectable 1 milliGRAM(s) IntraMuscular once  influenza   Vaccine 0.5 milliLiter(s) IntraMuscular once  insulin lispro (ADMELOG) corrective regimen sliding scale   SubCutaneous at bedtime  insulin lispro (ADMELOG) corrective regimen sliding scale   SubCutaneous three times a day before meals  lisinopril 5 milliGRAM(s) Oral daily  vancomycin  IVPB 1250 milliGRAM(s) IV Intermittent every 12 hours    MEDICATIONS  (PRN):  acetaminophen     Tablet .. 650 milliGRAM(s) Oral every 6 hours PRN Temp greater or equal to 38C (100.4F)  dextrose Oral Gel 15 Gram(s) Oral once PRN Blood Glucose LESS THAN 70 milliGRAM(s)/deciliter  HYDROmorphone  Injectable 0.5 milliGRAM(s) IV Push every 3 hours PRN Moderate Pain (4 - 6)  ondansetron Injectable 4 milliGRAM(s) IV Push once PRN Nausea and/or Vomiting  oxyCODONE    IR 5 milliGRAM(s) Oral every 6 hours PRN Severe Pain (7 - 10)    oxyCODONE    IR 5 milliGRAM(s) Oral every 6 hours PRN Severe Pain (7 - 10)      PHYSICAL EXAM:  GENERAL: NAD, well-developed  HEAD:  Atraumatic, Normocephalic  EYES: EOMI,  conjunctiva and sclera clear  NECK: Supple, No JVD  CHEST/LUNG: Clear to auscultation bilaterally; No wheeze  HEART: Regular rate and rhythm; No murmurs, rubs, or gallops  ABDOMEN: Soft, Nontender, Nondistended; Bowel sounds present  EXTREMITIES:  s/p BKA   PSYCH: AAOx3  NEUROLOGY: non-focal  SKIN: No rashes or lesions                                         9.3    11.76 )-----------( 194      ( 28 Nov 2023 07:24 )             30.5               CAPILLARY BLOOD GLUCOSE      POCT Blood Glucose.: 171 mg/dL (28 Nov 2023 22:08)  POCT Blood Glucose.: 238 mg/dL (28 Nov 2023 17:30)  POCT Blood Glucose.: 318 mg/dL (28 Nov 2023 12:06)  POCT Blood Glucose.: 233 mg/dL (28 Nov 2023 09:21)

## 2023-11-28 NOTE — PROGRESS NOTE ADULT - ASSESSMENT
Limb ischemia  bacteremia Gram positive   sepsis   OLIVER  sp BKA     empiric abx  has renal infarct  OLIVER resolved  MARINO shows no evidence of vegetation   fu with ID and renal   fu with vascular surgery

## 2023-11-28 NOTE — PROGRESS NOTE ADULT - ASSESSMENT
Suspect CNS in blood culture contaminant from single phlebotomy  LE changes do not look primarily infectious  Doubt cardioembolic disease in this setting (eg endocarditis)  Denies any symptoms PTA of infection  If there is infection in the foot, antibiotics will not get to the target given absent perfusion  Leukocytosis likely reactive to ischemia  Patient is in pain from ischemia  D/W patient, he's amenable now to talking with surgery again re: BKA    11/17: Still not entirely convinced this coagulase-negative staphylococcal bacteremia represents true infection, however no objection to continuing vancomycin as dosed by renal.  He has acute kidney injury likely secondary to contrast.  If this is a true bacteremia related to his ischemic limb, source control remains the key issue–which in this case is below-knee amputation.  At this point in time there is no ID contraindication to proposed surgery.  11/20: POD#1 kiel BKA. Bacteremia, sustained with S. epidermidis hard to disregard but somewhat surprising.   11/22: ECHO suboptimal. 11/21 cx pending. Creatinine dropped sharply.   11/24: Awaiting closure of BKA.  Awaiting MARINO.  Drop in creatinine does not appear to been spurious.  Low vancomycin trough correlates with this, dose adjusted previously. F/U cx thus far NTD.   11/28: MARINO unrevealing for IE. No further + cultures. Today is 13 of Rx. Minimal leukocytosis, doubt on an infectious basis at this point in time.      Suggestions--  Complete antibiotics after tomorrow's dosess  Trend CBC  Await formalization of BKA.   I'll sign off at this time.   Thank you for the courtesy of this referral.    Ayaz Morales MD  Attending Physician  [facility]  Division of Infectous Diseases  [phone]

## 2023-11-29 ENCOUNTER — TRANSCRIPTION ENCOUNTER (OUTPATIENT)
Age: 63
End: 2023-11-29

## 2023-11-29 LAB
ANION GAP SERPL CALC-SCNC: 7 MMOL/L — SIGNIFICANT CHANGE UP (ref 5–17)
ANION GAP SERPL CALC-SCNC: 7 MMOL/L — SIGNIFICANT CHANGE UP (ref 5–17)
BUN SERPL-MCNC: 13 MG/DL — SIGNIFICANT CHANGE UP (ref 7–23)
BUN SERPL-MCNC: 13 MG/DL — SIGNIFICANT CHANGE UP (ref 7–23)
CALCIUM SERPL-MCNC: 8.1 MG/DL — LOW (ref 8.4–10.5)
CALCIUM SERPL-MCNC: 8.1 MG/DL — LOW (ref 8.4–10.5)
CHLORIDE SERPL-SCNC: 100 MMOL/L — SIGNIFICANT CHANGE UP (ref 96–108)
CHLORIDE SERPL-SCNC: 100 MMOL/L — SIGNIFICANT CHANGE UP (ref 96–108)
CO2 SERPL-SCNC: 26 MMOL/L — SIGNIFICANT CHANGE UP (ref 22–31)
CO2 SERPL-SCNC: 26 MMOL/L — SIGNIFICANT CHANGE UP (ref 22–31)
CREAT SERPL-MCNC: 0.48 MG/DL — LOW (ref 0.5–1.3)
CREAT SERPL-MCNC: 0.48 MG/DL — LOW (ref 0.5–1.3)
EGFR: 115 ML/MIN/1.73M2 — SIGNIFICANT CHANGE UP
EGFR: 115 ML/MIN/1.73M2 — SIGNIFICANT CHANGE UP
GLUCOSE BLDC GLUCOMTR-MCNC: 205 MG/DL — HIGH (ref 70–99)
GLUCOSE BLDC GLUCOMTR-MCNC: 205 MG/DL — HIGH (ref 70–99)
GLUCOSE BLDC GLUCOMTR-MCNC: 211 MG/DL — HIGH (ref 70–99)
GLUCOSE BLDC GLUCOMTR-MCNC: 211 MG/DL — HIGH (ref 70–99)
GLUCOSE BLDC GLUCOMTR-MCNC: 219 MG/DL — HIGH (ref 70–99)
GLUCOSE BLDC GLUCOMTR-MCNC: 219 MG/DL — HIGH (ref 70–99)
GLUCOSE BLDC GLUCOMTR-MCNC: 267 MG/DL — HIGH (ref 70–99)
GLUCOSE BLDC GLUCOMTR-MCNC: 267 MG/DL — HIGH (ref 70–99)
GLUCOSE SERPL-MCNC: 216 MG/DL — HIGH (ref 70–99)
GLUCOSE SERPL-MCNC: 216 MG/DL — HIGH (ref 70–99)
HCT VFR BLD CALC: 30.5 % — LOW (ref 39–50)
HCT VFR BLD CALC: 30.5 % — LOW (ref 39–50)
HGB BLD-MCNC: 9.3 G/DL — LOW (ref 13–17)
HGB BLD-MCNC: 9.3 G/DL — LOW (ref 13–17)
MAGNESIUM SERPL-MCNC: 1.8 MG/DL — SIGNIFICANT CHANGE UP (ref 1.6–2.6)
MAGNESIUM SERPL-MCNC: 1.8 MG/DL — SIGNIFICANT CHANGE UP (ref 1.6–2.6)
MCHC RBC-ENTMCNC: 24.7 PG — LOW (ref 27–34)
MCHC RBC-ENTMCNC: 24.7 PG — LOW (ref 27–34)
MCHC RBC-ENTMCNC: 30.5 GM/DL — LOW (ref 32–36)
MCHC RBC-ENTMCNC: 30.5 GM/DL — LOW (ref 32–36)
MCV RBC AUTO: 81.1 FL — SIGNIFICANT CHANGE UP (ref 80–100)
MCV RBC AUTO: 81.1 FL — SIGNIFICANT CHANGE UP (ref 80–100)
NRBC # BLD: 0 /100 WBCS — SIGNIFICANT CHANGE UP (ref 0–0)
NRBC # BLD: 0 /100 WBCS — SIGNIFICANT CHANGE UP (ref 0–0)
PHOSPHATE SERPL-MCNC: 2.6 MG/DL — SIGNIFICANT CHANGE UP (ref 2.5–4.5)
PHOSPHATE SERPL-MCNC: 2.6 MG/DL — SIGNIFICANT CHANGE UP (ref 2.5–4.5)
PLATELET # BLD AUTO: 345 K/UL — SIGNIFICANT CHANGE UP (ref 150–400)
PLATELET # BLD AUTO: 345 K/UL — SIGNIFICANT CHANGE UP (ref 150–400)
POTASSIUM SERPL-MCNC: 3.8 MMOL/L — SIGNIFICANT CHANGE UP (ref 3.5–5.3)
POTASSIUM SERPL-MCNC: 3.8 MMOL/L — SIGNIFICANT CHANGE UP (ref 3.5–5.3)
POTASSIUM SERPL-SCNC: 3.8 MMOL/L — SIGNIFICANT CHANGE UP (ref 3.5–5.3)
POTASSIUM SERPL-SCNC: 3.8 MMOL/L — SIGNIFICANT CHANGE UP (ref 3.5–5.3)
RBC # BLD: 3.76 M/UL — LOW (ref 4.2–5.8)
RBC # BLD: 3.76 M/UL — LOW (ref 4.2–5.8)
RBC # FLD: 17.7 % — HIGH (ref 10.3–14.5)
RBC # FLD: 17.7 % — HIGH (ref 10.3–14.5)
SODIUM SERPL-SCNC: 133 MMOL/L — LOW (ref 135–145)
SODIUM SERPL-SCNC: 133 MMOL/L — LOW (ref 135–145)
VANCOMYCIN TROUGH SERPL-MCNC: 8.6 UG/ML — LOW (ref 10–20)
VANCOMYCIN TROUGH SERPL-MCNC: 8.6 UG/ML — LOW (ref 10–20)
WBC # BLD: 12.8 K/UL — HIGH (ref 3.8–10.5)
WBC # BLD: 12.8 K/UL — HIGH (ref 3.8–10.5)
WBC # FLD AUTO: 12.8 K/UL — HIGH (ref 3.8–10.5)
WBC # FLD AUTO: 12.8 K/UL — HIGH (ref 3.8–10.5)

## 2023-11-29 RX ORDER — CHLORHEXIDINE GLUCONATE 213 G/1000ML
1 SOLUTION TOPICAL
Refills: 0 | Status: DISCONTINUED | OUTPATIENT
Start: 2023-11-29 | End: 2023-11-30

## 2023-11-29 RX ORDER — POLYETHYLENE GLYCOL 3350 17 G/17G
17 POWDER, FOR SOLUTION ORAL DAILY
Refills: 0 | Status: DISCONTINUED | OUTPATIENT
Start: 2023-11-29 | End: 2023-11-30

## 2023-11-29 RX ORDER — LISINOPRIL 2.5 MG/1
20 TABLET ORAL DAILY
Refills: 0 | Status: DISCONTINUED | OUTPATIENT
Start: 2023-11-29 | End: 2023-11-30

## 2023-11-29 RX ORDER — SENNA PLUS 8.6 MG/1
2 TABLET ORAL AT BEDTIME
Refills: 0 | Status: DISCONTINUED | OUTPATIENT
Start: 2023-11-29 | End: 2023-11-30

## 2023-11-29 RX ADMIN — CHLORHEXIDINE GLUCONATE 1 APPLICATION(S): 213 SOLUTION TOPICAL at 11:27

## 2023-11-29 RX ADMIN — Medication 3: at 12:50

## 2023-11-29 RX ADMIN — HYDROMORPHONE HYDROCHLORIDE 0.5 MILLIGRAM(S): 2 INJECTION INTRAMUSCULAR; INTRAVENOUS; SUBCUTANEOUS at 10:04

## 2023-11-29 RX ADMIN — OXYCODONE HYDROCHLORIDE 5 MILLIGRAM(S): 5 TABLET ORAL at 16:32

## 2023-11-29 RX ADMIN — Medication 166.67 MILLIGRAM(S): at 05:57

## 2023-11-29 RX ADMIN — Medication 166.67 MILLIGRAM(S): at 16:31

## 2023-11-29 RX ADMIN — LISINOPRIL 5 MILLIGRAM(S): 2.5 TABLET ORAL at 05:56

## 2023-11-29 RX ADMIN — HYDROMORPHONE HYDROCHLORIDE 0.5 MILLIGRAM(S): 2 INJECTION INTRAMUSCULAR; INTRAVENOUS; SUBCUTANEOUS at 17:44

## 2023-11-29 RX ADMIN — HYDROMORPHONE HYDROCHLORIDE 0.5 MILLIGRAM(S): 2 INJECTION INTRAMUSCULAR; INTRAVENOUS; SUBCUTANEOUS at 16:30

## 2023-11-29 RX ADMIN — OXYCODONE HYDROCHLORIDE 5 MILLIGRAM(S): 5 TABLET ORAL at 10:41

## 2023-11-29 RX ADMIN — CHLORHEXIDINE GLUCONATE 1 APPLICATION(S): 213 SOLUTION TOPICAL at 11:28

## 2023-11-29 RX ADMIN — GABAPENTIN 300 MILLIGRAM(S): 400 CAPSULE ORAL at 06:12

## 2023-11-29 RX ADMIN — Medication 2: at 09:14

## 2023-11-29 RX ADMIN — AMLODIPINE BESYLATE 5 MILLIGRAM(S): 2.5 TABLET ORAL at 05:56

## 2023-11-29 RX ADMIN — Medication 2: at 16:38

## 2023-11-29 RX ADMIN — GABAPENTIN 300 MILLIGRAM(S): 400 CAPSULE ORAL at 22:34

## 2023-11-29 RX ADMIN — OXYCODONE HYDROCHLORIDE 5 MILLIGRAM(S): 5 TABLET ORAL at 21:29

## 2023-11-29 RX ADMIN — OXYCODONE HYDROCHLORIDE 5 MILLIGRAM(S): 5 TABLET ORAL at 21:59

## 2023-11-29 RX ADMIN — HYDROMORPHONE HYDROCHLORIDE 0.5 MILLIGRAM(S): 2 INJECTION INTRAMUSCULAR; INTRAVENOUS; SUBCUTANEOUS at 09:13

## 2023-11-29 RX ADMIN — GABAPENTIN 300 MILLIGRAM(S): 400 CAPSULE ORAL at 16:29

## 2023-11-29 RX ADMIN — LISINOPRIL 20 MILLIGRAM(S): 2.5 TABLET ORAL at 16:29

## 2023-11-29 NOTE — PROGRESS NOTE ADULT - ASSESSMENT
62 y/o male with pmhx htn, dm, hld  presents to the ED sent in from rehab center for right foot discoloration and pain  Hyponatremia   Metabolic acidosis   Wedge-shaped likely related  infection/pyelonephritis.  Hypertension   Hyponatremia     1 Renal - Resolved OLIVER;  Pacheco to gravity   Serum sodium is not significant    2 Vasc - SP guillotine BKA and dressing changes as well   3 ID -Vanco dose was decreased   4 GI-Nutritional support   5 CVS- BP is too high;  Increase lisinopril     [facility]   [phone]

## 2023-11-29 NOTE — PROGRESS NOTE ADULT - SUBJECTIVE AND OBJECTIVE BOX
Subjective: Patient seen and examined. No new events except as noted.     SUBJECTIVE/ROS:        MEDICATIONS:  MEDICATIONS  (STANDING):  amLODIPine   Tablet 5 milliGRAM(s) Oral daily  chlorhexidine 2% Cloths 1 Application(s) Topical daily  chlorhexidine 2% Cloths 1 Application(s) Topical two times a day  dextrose 5%. 1000 milliLiter(s) (50 mL/Hr) IV Continuous <Continuous>  dextrose 5%. 1000 milliLiter(s) (100 mL/Hr) IV Continuous <Continuous>  dextrose 50% Injectable 12.5 Gram(s) IV Push once  dextrose 50% Injectable 25 Gram(s) IV Push once  dextrose 50% Injectable 25 Gram(s) IV Push once  gabapentin 300 milliGRAM(s) Oral three times a day  glucagon  Injectable 1 milliGRAM(s) IntraMuscular once  influenza   Vaccine 0.5 milliLiter(s) IntraMuscular once  insulin lispro (ADMELOG) corrective regimen sliding scale   SubCutaneous at bedtime  insulin lispro (ADMELOG) corrective regimen sliding scale   SubCutaneous three times a day before meals  lisinopril 5 milliGRAM(s) Oral daily  vancomycin  IVPB 1250 milliGRAM(s) IV Intermittent every 12 hours      PHYSICAL EXAM:  T(C): 36.7 (11-29-23 @ 05:41), Max: 37.3 (11-28-23 @ 17:02)  HR: 81 (11-29-23 @ 05:41) (71 - 100)  BP: 157/79 (11-29-23 @ 05:41) (150/81 - 173/92)  RR: 18 (11-29-23 @ 05:41) (18 - 19)  SpO2: 93% (11-29-23 @ 05:41) (92% - 96%)  Wt(kg): --  I&O's Summary    28 Nov 2023 07:01  -  29 Nov 2023 07:00  --------------------------------------------------------  IN: 720 mL / OUT: 2100 mL / NET: -1380 mL            JVP: Normal  Neck: supple  Lung: clear   CV: S1 S2 , Murmur:  Abd: soft  Ext: No edema  neuro: Awake / alert  Psych: flat affect      LABS/DATA:    CARDIAC MARKERS:                                9.3    12.80 )-----------( 345      ( 29 Nov 2023 05:52 )             30.5     11-29    133<L>  |  100  |  13  ----------------------------<  216<H>  3.8   |  26  |  0.48<L>    Ca    8.1<L>      29 Nov 2023 05:52  Phos  2.6     11-29  Mg     1.8     11-29      proBNP:   Lipid Profile:   HgA1c:   TSH:     TELE:  EKG:

## 2023-11-29 NOTE — PROGRESS NOTE ADULT - SUBJECTIVE AND OBJECTIVE BOX
[facility] [phone]        Patient seen and examined in bed.  He was the same         MEDICATIONS  (STANDING):  amLODIPine   Tablet 5 milliGRAM(s) Oral daily  chlorhexidine 2% Cloths 1 Application(s) Topical daily  chlorhexidine 2% Cloths 1 Application(s) Topical two times a day  dextrose 5%. 1000 milliLiter(s) (50 mL/Hr) IV Continuous <Continuous>  dextrose 5%. 1000 milliLiter(s) (100 mL/Hr) IV Continuous <Continuous>  dextrose 50% Injectable 25 Gram(s) IV Push once  dextrose 50% Injectable 25 Gram(s) IV Push once  dextrose 50% Injectable 12.5 Gram(s) IV Push once  gabapentin 300 milliGRAM(s) Oral three times a day  glucagon  Injectable 1 milliGRAM(s) IntraMuscular once  influenza   Vaccine 0.5 milliLiter(s) IntraMuscular once  insulin lispro (ADMELOG) corrective regimen sliding scale   SubCutaneous three times a day before meals  insulin lispro (ADMELOG) corrective regimen sliding scale   SubCutaneous at bedtime  lisinopril 5 milliGRAM(s) Oral daily  vancomycin  IVPB 1250 milliGRAM(s) IV Intermittent every 12 hours      VITAL:  T(C): , Max: 37.3 (11-28-23 @ 17:02)  T(F): , Max: 99.2 (11-28-23 @ 17:02)  HR: 96 (11-29-23 @ 10:07)  BP: 175/90 (11-29-23 @ 10:07)  BP(mean): --  RR: 18 (11-29-23 @ 10:07)  SpO2: 95% (11-29-23 @ 10:07)  Wt(kg): --    I and O's:    11-28 @ 07:01  -  11-29 @ 07:00  --------------------------------------------------------  IN: 720 mL / OUT: 2100 mL / NET: -1380 mL    11-29 @ 07:01  -  11-29 @ 11:19  --------------------------------------------------------  IN: 440 mL / OUT: 950 mL / NET: -510 mL          PHYSICAL EXAM:    Constitutional: NAD  Neck:  No JVD  Respiratory: CTAB/L  Cardiovascular: S1 and S2  Gastrointestinal: BS+, soft, NT/ND  Extremities: No peripheral edema + BKA right   Neurological: A/O x 3, no focal deficits  Psychiatric: Normal mood, normal affect  : +  Pacheco  Skin: No rashes  Access: Not applicable    LABS:                        9.3    12.80 )-----------( 345      ( 29 Nov 2023 05:52 )             30.5     11-29    133<L>  |  100  |  13  ----------------------------<  216<H>  3.8   |  26  |  0.48<L>    Ca    8.1<L>      29 Nov 2023 05:52  Phos  2.6     11-29  Mg     1.8     11-29            Urine Studies:  Urinalysis Basic - ( 29 Nov 2023 05:52 )    Color: x / Appearance: x / SG: x / pH: x  Gluc: 216 mg/dL / Ketone: x  / Bili: x / Urobili: x   Blood: x / Protein: x / Nitrite: x   Leuk Esterase: x / RBC: x / WBC x   Sq Epi: x / Non Sq Epi: x / Bacteria: x            RADIOLOGY & ADDITIONAL STUDIES:          < from: US Duplex Kidneys (11.22.23 @ 13:56) >    ACC: [id number] EXAM:  US DPLX KIDNEY(IES)   ORDERED BY: EDNA BELLAMY     PROCEDURE DATE:  11/22/2023          INTERPRETATION:  CLINICAL INFORMATION: Peripheral arterial disease.   Concern for renal infarct.    COMPARISON: Abdominal MRI dated 11/2/2016.CTA of the aorta and lower   extremities dated 11/15/2023.    TECHNIQUE: Color and spectral Doppler evaluation of the kidneys.    FINDINGS:  Right kidney: 12.0 cm. No renal mass, hydronephrosis or calculi.   Increased renal cortical echogenicity.  Left kidney: 11.3 cm. No renal mass, hydronephrosis or calculi. Increased   renal cortical echogenicity. To adjacent renal cortical cysts at the   lower pole measuring 1.4 and 1.2 cm respectively, corresponding to area   of concern on recent CT. No evidence of cortical infarct.    Urinary bladder: Pacheco catheter.    Color and spectral Doppler reveals normal, symmetric blood flow   throughout both kidneys.  Peak aortic velocity is 38 cm/sec.  IVC/Renal Veins: Patent.    RIGHT  Renal Artery:  Peak systolic velocity is 63 cm/sec origin, 62 cm/sec proximal, 54 cm/sec   mid, 56 cm/sec distal and 52 cm/sec hilum.  Upper Segmental Artery:  RI = 0.71  Middle Segmental Artery: RI = 0.73  Lower Segmental Artery: RI = 0.70    LEFT  Renal Artery:  Peak systolic velocity is 54 cm/sec origin, 78 cm/sec proximal, 62 cm/sec   mid, 80 cm/sec distal and 76 cm/sec hilum.  Upper Segmental Artery:  RI = 0.72  Middle Segmental Artery: RI = 0.66  Lower Segmental Artery: RI = 0.72    IMPRESSION:    No duplex evidence of hemodynamically significant renal artery stenosis   or renal vein thrombosis.    Increased renal cortical echogenicity suggesting parenchymal renal   disease.    Small cortical cysts at the lower pole of left kidney correspond to area   of concern reported on recent CT. No evidence of renal infarct.      < end of copied text >    < from: MARINO W or WO Ultrasound Enhancing Agent (11.27.23 @ 10:23) >    TRANSESOPHAGEAL ECHOCARDIOGRAM REPORT  ________________________________________________________________________________                                      _______       Pt. Name:       AIXA LARSON Study Date:    11/27/2023  MRN:            [id number]       YOB: 1960  Accession #:    [id number]       Age:           63 years  Account#:       [id number]    Gender:        M  Heart Rate:                     Height:        67.00 in (170.18 cm)  Rhythm:                         Weight:        142.00 lb (64.41 kg)  Blood Pressure: 168/90 mmHg     BSA/BMI:       1.75 m² / 22.24 kg/m²  ________________________________________________________________________________________  Referring Physician:    [id number] Juany Rivas  Interpreting Physician: Juliane Feng MD  Fellow (Performing):    Dalton Jalloh MD  Fellow (Interpreting):  Dalton Jalloh MD    CPT:               ECHO 3D RECONSTRUCT W WORKSTATION - 26684.m;ECHO TRANSESOPH                     W/O CON - 77571.m;DOPPLERECHO COMP W SPECT - 57395.m;DOPPL                     ECHO COLOR FLOW - 82990.m  Indication(s):     Endocarditis, valve unspecified - I38  Procedure:         Transesophageal echocardiogram performed with 2D, M-mode and                     complete spectral and color flow Doppler. Full volume                     3-dimensional reconstruction performed at the workstation.  Ordering Location: [facility]  Study Information: Image quality for this study is adequate.    _______________________________________________________________________________________     CONCLUSIONS:      1. Left ventricular wall thickness is moderately increased. Left ventricular systolic function is normal with an ejection fraction visually estimated at 55 to 60 %. There are no regional wall motion abnormalities seen.   2. Normal right ventricular cavity size, wall thickness, and systolic function.   3. Focal thickening seen on the tips of the non cusp. There is no aortic valve stenosis. There is mild aortic regurgitation.   4. There are degenerative changes of the valve such as thickened mitral and aortic valve leaflets. No obvious vegetation seen.   5. Small pericardial effusion.    ________________________________________________________________________________________  FINDINGS:     Left Ventricle:  Left ventricular wall thickness is moderately increased. Left ventricular systolic function is normal with an ejection fraction visually estimated at 55 to 60%. There are no regional wall motion abnormalities seen. There is no evidence of a left ventricular thrombus.     Right Ventricle:  The right ventricular cavity is normal in size, normal wall thickness and normal systolic function.     Left Atrium:  The left atrium is normal. There is no evidence of left atrialor left atrial appendage thrombus.     Right Atrium:  The right atrium is normal in size.     Interatrial Septum:  The interatrial septum appears intact.     Aortic Valve:  The aortic valve is tricuspid with normal leaflet excursion. There is mild thickening of the aortic valve leaflets. Focal thickening seen on the tips of the non cusp. There is no aortic valve stenosis. There is mild aortic regurgitation.     Mitral Valve:  Structurally normal mitral valve with normal leaflet excursion. There is mitral valve thickening of the anterior and posterior leaflets. There is no mitral valve stenosis. There is mild mitral regurgitation.     Tricuspid Valve:  Structurally normal tricuspid valve with normal leaflet excursion. There is trace tricuspid regurgitation.     Pulmonic Valve:  Structurally normal pulmonic valve with normal leaflet excursion. There is trace pulmonic regurgitation.     Aorta:  The aortic annulus and aortic root appear normal in size. There is mild atheroma in the visualizedportions of the proximal ascending aorta. There is moderate atheroma in the visualized portions of the transverse aortic arch. There is moderate atheroma in the visualized portions of the descending aorta.     Pericardium:  There is a small pericardial effusion.     Systemic Veins:  The inferior vena cava is normal in size (normal <2.1cm) with normal inspiratory collapse (normal >50%) consistent with normal right atrial pressure (~3, range 0-5mmHg).  ____________________________________________________________________  QUANTITATIVE DATA:  Left Ventricle Measurements: (Indexed to BSA)     Visualized LV EF%: 55 to 60%    Aorta Measurements: (normal range) (Indexed to BSA)     Ao Asc prox: 3.60 cm       Tricuspid Valve Measurements:     RA Pressure: 3 mmHg    ________________________________________________________________________________________  Electronically signed on 11/27/2023 at 6:34:19 PM by Juliane Feng MD         *** Final ***    < end of copied text >

## 2023-11-29 NOTE — PROGRESS NOTE ADULT - SUBJECTIVE AND OBJECTIVE BOX
SURGERY DAILY PROGRESS NOTE:       SUBJECTIVE/ROS: No acute events overnight. Patient seen and examined bedside on AM rounds.   Daily dressing changes. Plan for Thu, 11/30 R AKA formalization.    OBJECTIVE:    Vital Signs Last 24 Hrs  T(C): 36.7 (29 Nov 2023 05:41), Max: 37.3 (28 Nov 2023 17:02)  T(F): 98 (29 Nov 2023 05:41), Max: 99.2 (28 Nov 2023 17:02)  HR: 81 (29 Nov 2023 05:41) (71 - 100)  BP: 157/79 (29 Nov 2023 05:41) (150/81 - 173/92)  BP(mean): --  RR: 18 (29 Nov 2023 05:41) (18 - 19)  SpO2: 93% (29 Nov 2023 05:41) (92% - 96%)    Parameters below as of 29 Nov 2023 05:41  Patient On (Oxygen Delivery Method): room air      I&O's Detail    28 Nov 2023 07:01  -  29 Nov 2023 07:00  --------------------------------------------------------  IN:    Oral Fluid: 720 mL  Total IN: 720 mL    OUT:    Indwelling Catheter - Urethral (mL): 2100 mL  Total OUT: 2100 mL    Total NET: -1380 mL        Daily     Daily   MEDICATIONS  (STANDING):  amLODIPine   Tablet 5 milliGRAM(s) Oral daily  chlorhexidine 2% Cloths 1 Application(s) Topical daily  dextrose 5%. 1000 milliLiter(s) (50 mL/Hr) IV Continuous <Continuous>  dextrose 5%. 1000 milliLiter(s) (100 mL/Hr) IV Continuous <Continuous>  dextrose 50% Injectable 25 Gram(s) IV Push once  dextrose 50% Injectable 25 Gram(s) IV Push once  dextrose 50% Injectable 12.5 Gram(s) IV Push once  gabapentin 300 milliGRAM(s) Oral three times a day  glucagon  Injectable 1 milliGRAM(s) IntraMuscular once  influenza   Vaccine 0.5 milliLiter(s) IntraMuscular once  insulin lispro (ADMELOG) corrective regimen sliding scale   SubCutaneous at bedtime  insulin lispro (ADMELOG) corrective regimen sliding scale   SubCutaneous three times a day before meals  lisinopril 5 milliGRAM(s) Oral daily  vancomycin  IVPB 1250 milliGRAM(s) IV Intermittent every 12 hours    MEDICATIONS  (PRN):  acetaminophen     Tablet .. 650 milliGRAM(s) Oral every 6 hours PRN Temp greater or equal to 38C (100.4F)  dextrose Oral Gel 15 Gram(s) Oral once PRN Blood Glucose LESS THAN 70 milliGRAM(s)/deciliter  HYDROmorphone  Injectable 0.5 milliGRAM(s) IV Push every 3 hours PRN Moderate Pain (4 - 6)  ondansetron Injectable 4 milliGRAM(s) IV Push once PRN Nausea and/or Vomiting  oxyCODONE    IR 5 milliGRAM(s) Oral every 6 hours PRN Severe Pain (7 - 10)      LABS:                        9.3    12.80 )-----------( 345      ( 29 Nov 2023 05:52 )             30.5     11-29    133<L>  |  100  |  13  ----------------------------<  216<H>  3.8   |  26  |  0.48<L>    Ca    8.1<L>      29 Nov 2023 05:52  Phos  2.6     11-29  Mg     1.8     11-29        Urinalysis Basic - ( 29 Nov 2023 05:52 )    Color: x / Appearance: x / SG: x / pH: x  Gluc: 216 mg/dL / Ketone: x  / Bili: x / Urobili: x   Blood: x / Protein: x / Nitrite: x   Leuk Esterase: x / RBC: x / WBC x   Sq Epi: x / Non Sq Epi: x / Bacteria: x          PHYSICAL EXAM:  General Appearance: Appears well, NAD. Anxious  Chest: Nonlabored breathing on RA  Extremities: RLE s/p BKA, dressing C/D/I. LLE with SCD and Z flow boot  Vascular: Extremities WWP. BKA dressing removed, underlying stump site clean without active bleeding or purulence, fresh dressing applied

## 2023-11-29 NOTE — PROGRESS NOTE ADULT - ASSESSMENT
63M PMHx HTN, DM, HLD, presented to ED from rehab for acute limb ischemia. Patient was discharged about 1 week ago from OSH to rehab for conservative medical treatment of foot infection and later brought to ED for increased pain and acute discoloration of foot. Does not weightbear on right leg and has not ambulated in months. Patient now s/p R kiel ESPARZA 11/19, recovering appropriately.     Recommendations:  - DVT ppx   - Pain control: appreciate acute pain recs   - Wound care: daily dressing changes  - Planning for formalization Thu, 11/30  - Please document cards and meds optimization/clearances for the OR  - Remainder of care per primary team    Vascular Surgery   [id number]

## 2023-11-30 ENCOUNTER — RESULT REVIEW (OUTPATIENT)
Age: 63
End: 2023-11-30

## 2023-11-30 LAB
ANION GAP SERPL CALC-SCNC: 13 MMOL/L — SIGNIFICANT CHANGE UP (ref 5–17)
ANION GAP SERPL CALC-SCNC: 13 MMOL/L — SIGNIFICANT CHANGE UP (ref 5–17)
ANION GAP SERPL CALC-SCNC: 9 MMOL/L — SIGNIFICANT CHANGE UP (ref 5–17)
ANION GAP SERPL CALC-SCNC: 9 MMOL/L — SIGNIFICANT CHANGE UP (ref 5–17)
APTT BLD: 27 SEC — SIGNIFICANT CHANGE UP (ref 24.5–35.6)
APTT BLD: 27 SEC — SIGNIFICANT CHANGE UP (ref 24.5–35.6)
BLD GP AB SCN SERPL QL: NEGATIVE — SIGNIFICANT CHANGE UP
BLD GP AB SCN SERPL QL: NEGATIVE — SIGNIFICANT CHANGE UP
BUN SERPL-MCNC: 12 MG/DL — SIGNIFICANT CHANGE UP (ref 7–23)
CALCIUM SERPL-MCNC: 8.3 MG/DL — LOW (ref 8.4–10.5)
CALCIUM SERPL-MCNC: 8.3 MG/DL — LOW (ref 8.4–10.5)
CALCIUM SERPL-MCNC: <3 MG/DL — CRITICAL LOW (ref 8.4–10.5)
CALCIUM SERPL-MCNC: <3 MG/DL — CRITICAL LOW (ref 8.4–10.5)
CHLORIDE SERPL-SCNC: 96 MMOL/L — SIGNIFICANT CHANGE UP (ref 96–108)
CHLORIDE SERPL-SCNC: 96 MMOL/L — SIGNIFICANT CHANGE UP (ref 96–108)
CHLORIDE SERPL-SCNC: 99 MMOL/L — SIGNIFICANT CHANGE UP (ref 96–108)
CHLORIDE SERPL-SCNC: 99 MMOL/L — SIGNIFICANT CHANGE UP (ref 96–108)
CO2 SERPL-SCNC: 23 MMOL/L — SIGNIFICANT CHANGE UP (ref 22–31)
CO2 SERPL-SCNC: 23 MMOL/L — SIGNIFICANT CHANGE UP (ref 22–31)
CO2 SERPL-SCNC: 26 MMOL/L — SIGNIFICANT CHANGE UP (ref 22–31)
CO2 SERPL-SCNC: 26 MMOL/L — SIGNIFICANT CHANGE UP (ref 22–31)
CREAT SERPL-MCNC: 0.45 MG/DL — LOW (ref 0.5–1.3)
CREAT SERPL-MCNC: 0.45 MG/DL — LOW (ref 0.5–1.3)
CREAT SERPL-MCNC: 0.46 MG/DL — LOW (ref 0.5–1.3)
CREAT SERPL-MCNC: 0.46 MG/DL — LOW (ref 0.5–1.3)
EGFR: 117 ML/MIN/1.73M2 — SIGNIFICANT CHANGE UP
EGFR: 117 ML/MIN/1.73M2 — SIGNIFICANT CHANGE UP
EGFR: 118 ML/MIN/1.73M2 — SIGNIFICANT CHANGE UP
EGFR: 118 ML/MIN/1.73M2 — SIGNIFICANT CHANGE UP
GLUCOSE BLDC GLUCOMTR-MCNC: 141 MG/DL — HIGH (ref 70–99)
GLUCOSE BLDC GLUCOMTR-MCNC: 141 MG/DL — HIGH (ref 70–99)
GLUCOSE BLDC GLUCOMTR-MCNC: 232 MG/DL — HIGH (ref 70–99)
GLUCOSE BLDC GLUCOMTR-MCNC: 232 MG/DL — HIGH (ref 70–99)
GLUCOSE BLDC GLUCOMTR-MCNC: 236 MG/DL — HIGH (ref 70–99)
GLUCOSE BLDC GLUCOMTR-MCNC: 236 MG/DL — HIGH (ref 70–99)
GLUCOSE BLDC GLUCOMTR-MCNC: 284 MG/DL — HIGH (ref 70–99)
GLUCOSE BLDC GLUCOMTR-MCNC: 284 MG/DL — HIGH (ref 70–99)
GLUCOSE SERPL-MCNC: 197 MG/DL — HIGH (ref 70–99)
GLUCOSE SERPL-MCNC: 197 MG/DL — HIGH (ref 70–99)
GLUCOSE SERPL-MCNC: 203 MG/DL — HIGH (ref 70–99)
GLUCOSE SERPL-MCNC: 203 MG/DL — HIGH (ref 70–99)
HCT VFR BLD CALC: 31.7 % — LOW (ref 39–50)
HCT VFR BLD CALC: 31.7 % — LOW (ref 39–50)
HGB BLD-MCNC: 9.7 G/DL — LOW (ref 13–17)
HGB BLD-MCNC: 9.7 G/DL — LOW (ref 13–17)
INR BLD: 1.11 RATIO — SIGNIFICANT CHANGE UP (ref 0.85–1.18)
INR BLD: 1.11 RATIO — SIGNIFICANT CHANGE UP (ref 0.85–1.18)
MAGNESIUM SERPL-MCNC: 1.9 MG/DL — SIGNIFICANT CHANGE UP (ref 1.6–2.6)
MAGNESIUM SERPL-MCNC: 1.9 MG/DL — SIGNIFICANT CHANGE UP (ref 1.6–2.6)
MAGNESIUM SERPL-MCNC: <.6 MG/DL — CRITICAL LOW (ref 1.6–2.6)
MAGNESIUM SERPL-MCNC: <.6 MG/DL — CRITICAL LOW (ref 1.6–2.6)
MCHC RBC-ENTMCNC: 24.7 PG — LOW (ref 27–34)
MCHC RBC-ENTMCNC: 24.7 PG — LOW (ref 27–34)
MCHC RBC-ENTMCNC: 30.6 GM/DL — LOW (ref 32–36)
MCHC RBC-ENTMCNC: 30.6 GM/DL — LOW (ref 32–36)
MCV RBC AUTO: 80.9 FL — SIGNIFICANT CHANGE UP (ref 80–100)
MCV RBC AUTO: 80.9 FL — SIGNIFICANT CHANGE UP (ref 80–100)
NRBC # BLD: 0 /100 WBCS — SIGNIFICANT CHANGE UP (ref 0–0)
NRBC # BLD: 0 /100 WBCS — SIGNIFICANT CHANGE UP (ref 0–0)
PHOSPHATE SERPL-MCNC: 2.7 MG/DL — SIGNIFICANT CHANGE UP (ref 2.5–4.5)
PLATELET # BLD AUTO: 334 K/UL — SIGNIFICANT CHANGE UP (ref 150–400)
PLATELET # BLD AUTO: 334 K/UL — SIGNIFICANT CHANGE UP (ref 150–400)
POTASSIUM SERPL-MCNC: 3.7 MMOL/L — SIGNIFICANT CHANGE UP (ref 3.5–5.3)
POTASSIUM SERPL-MCNC: 3.7 MMOL/L — SIGNIFICANT CHANGE UP (ref 3.5–5.3)
POTASSIUM SERPL-MCNC: >9 MMOL/L — CRITICAL HIGH (ref 3.5–5.3)
POTASSIUM SERPL-MCNC: >9 MMOL/L — CRITICAL HIGH (ref 3.5–5.3)
POTASSIUM SERPL-SCNC: 3.7 MMOL/L — SIGNIFICANT CHANGE UP (ref 3.5–5.3)
POTASSIUM SERPL-SCNC: 3.7 MMOL/L — SIGNIFICANT CHANGE UP (ref 3.5–5.3)
POTASSIUM SERPL-SCNC: >9 MMOL/L — CRITICAL HIGH (ref 3.5–5.3)
POTASSIUM SERPL-SCNC: >9 MMOL/L — CRITICAL HIGH (ref 3.5–5.3)
PROTHROM AB SERPL-ACNC: 12.2 SEC — SIGNIFICANT CHANGE UP (ref 9.5–13)
PROTHROM AB SERPL-ACNC: 12.2 SEC — SIGNIFICANT CHANGE UP (ref 9.5–13)
RBC # BLD: 3.92 M/UL — LOW (ref 4.2–5.8)
RBC # BLD: 3.92 M/UL — LOW (ref 4.2–5.8)
RBC # FLD: 17.8 % — HIGH (ref 10.3–14.5)
RBC # FLD: 17.8 % — HIGH (ref 10.3–14.5)
RH IG SCN BLD-IMP: POSITIVE — SIGNIFICANT CHANGE UP
RH IG SCN BLD-IMP: POSITIVE — SIGNIFICANT CHANGE UP
SODIUM SERPL-SCNC: 132 MMOL/L — LOW (ref 135–145)
SODIUM SERPL-SCNC: 132 MMOL/L — LOW (ref 135–145)
SODIUM SERPL-SCNC: 134 MMOL/L — LOW (ref 135–145)
SODIUM SERPL-SCNC: 134 MMOL/L — LOW (ref 135–145)
VANCOMYCIN TROUGH SERPL-MCNC: 8.3 UG/ML — LOW (ref 10–20)
VANCOMYCIN TROUGH SERPL-MCNC: 8.3 UG/ML — LOW (ref 10–20)
WBC # BLD: 10.97 K/UL — HIGH (ref 3.8–10.5)
WBC # BLD: 10.97 K/UL — HIGH (ref 3.8–10.5)
WBC # FLD AUTO: 10.97 K/UL — HIGH (ref 3.8–10.5)
WBC # FLD AUTO: 10.97 K/UL — HIGH (ref 3.8–10.5)

## 2023-11-30 PROCEDURE — 27590 AMPUTATE LEG AT THIGH: CPT

## 2023-11-30 PROCEDURE — 97605 NEG PRS WND THER DME<=50SQCM: CPT

## 2023-11-30 PROCEDURE — 88307 TISSUE EXAM BY PATHOLOGIST: CPT | Mod: 26

## 2023-11-30 RX ORDER — SENNA PLUS 8.6 MG/1
2 TABLET ORAL AT BEDTIME
Refills: 0 | Status: DISCONTINUED | OUTPATIENT
Start: 2023-11-30 | End: 2023-12-08

## 2023-11-30 RX ORDER — GABAPENTIN 400 MG/1
300 CAPSULE ORAL THREE TIMES A DAY
Refills: 0 | Status: DISCONTINUED | OUTPATIENT
Start: 2023-11-30 | End: 2023-12-08

## 2023-11-30 RX ORDER — DEXTROSE 50 % IN WATER 50 %
12.5 SYRINGE (ML) INTRAVENOUS ONCE
Refills: 0 | Status: DISCONTINUED | OUTPATIENT
Start: 2023-11-30 | End: 2023-12-08

## 2023-11-30 RX ORDER — INSULIN LISPRO 100/ML
VIAL (ML) SUBCUTANEOUS AT BEDTIME
Refills: 0 | Status: DISCONTINUED | OUTPATIENT
Start: 2023-11-30 | End: 2023-12-08

## 2023-11-30 RX ORDER — INSULIN LISPRO 100/ML
VIAL (ML) SUBCUTANEOUS
Refills: 0 | Status: DISCONTINUED | OUTPATIENT
Start: 2023-11-30 | End: 2023-12-08

## 2023-11-30 RX ORDER — HYDROMORPHONE HYDROCHLORIDE 2 MG/ML
0.5 INJECTION INTRAMUSCULAR; INTRAVENOUS; SUBCUTANEOUS
Refills: 0 | Status: DISCONTINUED | OUTPATIENT
Start: 2023-11-30 | End: 2023-11-30

## 2023-11-30 RX ORDER — ACETAMINOPHEN 500 MG
650 TABLET ORAL EVERY 6 HOURS
Refills: 0 | Status: DISCONTINUED | OUTPATIENT
Start: 2023-11-30 | End: 2023-12-08

## 2023-11-30 RX ORDER — DEXTROSE 50 % IN WATER 50 %
25 SYRINGE (ML) INTRAVENOUS ONCE
Refills: 0 | Status: DISCONTINUED | OUTPATIENT
Start: 2023-11-30 | End: 2023-12-08

## 2023-11-30 RX ORDER — DEXTROSE 50 % IN WATER 50 %
15 SYRINGE (ML) INTRAVENOUS ONCE
Refills: 0 | Status: DISCONTINUED | OUTPATIENT
Start: 2023-11-30 | End: 2023-12-08

## 2023-11-30 RX ORDER — OXYCODONE HYDROCHLORIDE 5 MG/1
5 TABLET ORAL EVERY 6 HOURS
Refills: 0 | Status: DISCONTINUED | OUTPATIENT
Start: 2023-11-30 | End: 2023-12-07

## 2023-11-30 RX ORDER — INSULIN LISPRO 100/ML
VIAL (ML) SUBCUTANEOUS EVERY 6 HOURS
Refills: 0 | Status: DISCONTINUED | OUTPATIENT
Start: 2023-11-30 | End: 2023-11-30

## 2023-11-30 RX ORDER — POTASSIUM CHLORIDE 20 MEQ
10 PACKET (EA) ORAL
Refills: 0 | Status: COMPLETED | OUTPATIENT
Start: 2023-11-30 | End: 2023-11-30

## 2023-11-30 RX ORDER — ONDANSETRON 8 MG/1
4 TABLET, FILM COATED ORAL ONCE
Refills: 0 | Status: DISCONTINUED | OUTPATIENT
Start: 2023-11-30 | End: 2023-11-30

## 2023-11-30 RX ORDER — GLUCAGON INJECTION, SOLUTION 0.5 MG/.1ML
1 INJECTION, SOLUTION SUBCUTANEOUS ONCE
Refills: 0 | Status: DISCONTINUED | OUTPATIENT
Start: 2023-11-30 | End: 2023-12-08

## 2023-11-30 RX ORDER — AMLODIPINE BESYLATE 2.5 MG/1
5 TABLET ORAL DAILY
Refills: 0 | Status: DISCONTINUED | OUTPATIENT
Start: 2023-11-30 | End: 2023-12-08

## 2023-11-30 RX ORDER — VANCOMYCIN HCL 1 G
1000 VIAL (EA) INTRAVENOUS EVERY 12 HOURS
Refills: 0 | Status: DISCONTINUED | OUTPATIENT
Start: 2023-11-30 | End: 2023-12-01

## 2023-11-30 RX ORDER — ONDANSETRON 8 MG/1
4 TABLET, FILM COATED ORAL ONCE
Refills: 0 | Status: DISCONTINUED | OUTPATIENT
Start: 2023-11-30 | End: 2023-12-08

## 2023-11-30 RX ORDER — CHLORHEXIDINE GLUCONATE 213 G/1000ML
1 SOLUTION TOPICAL DAILY
Refills: 0 | Status: DISCONTINUED | OUTPATIENT
Start: 2023-11-30 | End: 2023-12-08

## 2023-11-30 RX ORDER — FENTANYL CITRATE 50 UG/ML
50 INJECTION INTRAVENOUS
Refills: 0 | Status: DISCONTINUED | OUTPATIENT
Start: 2023-11-30 | End: 2023-11-30

## 2023-11-30 RX ORDER — POLYETHYLENE GLYCOL 3350 17 G/17G
17 POWDER, FOR SOLUTION ORAL DAILY
Refills: 0 | Status: DISCONTINUED | OUTPATIENT
Start: 2023-11-30 | End: 2023-12-08

## 2023-11-30 RX ORDER — LISINOPRIL 2.5 MG/1
20 TABLET ORAL DAILY
Refills: 0 | Status: DISCONTINUED | OUTPATIENT
Start: 2023-11-30 | End: 2023-12-08

## 2023-11-30 RX ORDER — SODIUM CHLORIDE 9 MG/ML
1000 INJECTION, SOLUTION INTRAVENOUS
Refills: 0 | Status: DISCONTINUED | OUTPATIENT
Start: 2023-11-30 | End: 2023-12-08

## 2023-11-30 RX ORDER — FENTANYL CITRATE 50 UG/ML
25 INJECTION INTRAVENOUS
Refills: 0 | Status: DISCONTINUED | OUTPATIENT
Start: 2023-11-30 | End: 2023-11-30

## 2023-11-30 RX ORDER — CHLORHEXIDINE GLUCONATE 213 G/1000ML
1 SOLUTION TOPICAL
Refills: 0 | Status: DISCONTINUED | OUTPATIENT
Start: 2023-11-30 | End: 2023-12-08

## 2023-11-30 RX ADMIN — Medication 100 MILLIEQUIVALENT(S): at 08:10

## 2023-11-30 RX ADMIN — LISINOPRIL 20 MILLIGRAM(S): 2.5 TABLET ORAL at 06:27

## 2023-11-30 RX ADMIN — Medication 127.5 MILLIMOLE(S): at 11:13

## 2023-11-30 RX ADMIN — OXYCODONE HYDROCHLORIDE 5 MILLIGRAM(S): 5 TABLET ORAL at 10:39

## 2023-11-30 RX ADMIN — Medication 2: at 12:22

## 2023-11-30 RX ADMIN — AMLODIPINE BESYLATE 5 MILLIGRAM(S): 2.5 TABLET ORAL at 06:27

## 2023-11-30 RX ADMIN — Medication 2: at 06:49

## 2023-11-30 RX ADMIN — Medication 100 MILLIEQUIVALENT(S): at 07:32

## 2023-11-30 RX ADMIN — CHLORHEXIDINE GLUCONATE 1 APPLICATION(S): 213 SOLUTION TOPICAL at 06:27

## 2023-11-30 RX ADMIN — OXYCODONE HYDROCHLORIDE 5 MILLIGRAM(S): 5 TABLET ORAL at 09:44

## 2023-11-30 RX ADMIN — POLYETHYLENE GLYCOL 3350 17 GRAM(S): 17 POWDER, FOR SOLUTION ORAL at 11:02

## 2023-11-30 RX ADMIN — CHLORHEXIDINE GLUCONATE 1 APPLICATION(S): 213 SOLUTION TOPICAL at 11:02

## 2023-11-30 RX ADMIN — Medication 1: at 22:22

## 2023-11-30 RX ADMIN — GABAPENTIN 300 MILLIGRAM(S): 400 CAPSULE ORAL at 22:22

## 2023-11-30 RX ADMIN — Medication 250 MILLIGRAM(S): at 18:56

## 2023-11-30 RX ADMIN — GABAPENTIN 300 MILLIGRAM(S): 400 CAPSULE ORAL at 06:27

## 2023-11-30 RX ADMIN — Medication 100 MILLIEQUIVALENT(S): at 09:30

## 2023-11-30 NOTE — PROGRESS NOTE ADULT - SUBJECTIVE AND OBJECTIVE BOX
Subjective: Patient seen and examined. No new events except as noted.     SUBJECTIVE/ROS:  nad      MEDICATIONS:  MEDICATIONS  (STANDING):  amLODIPine   Tablet 5 milliGRAM(s) Oral daily  chlorhexidine 2% Cloths 1 Application(s) Topical daily  chlorhexidine 2% Cloths 1 Application(s) Topical two times a day  dextrose 5%. 1000 milliLiter(s) (50 mL/Hr) IV Continuous <Continuous>  dextrose 5%. 1000 milliLiter(s) (100 mL/Hr) IV Continuous <Continuous>  dextrose 50% Injectable 25 Gram(s) IV Push once  dextrose 50% Injectable 25 Gram(s) IV Push once  dextrose 50% Injectable 12.5 Gram(s) IV Push once  gabapentin 300 milliGRAM(s) Oral three times a day  glucagon  Injectable 1 milliGRAM(s) IntraMuscular once  influenza   Vaccine 0.5 milliLiter(s) IntraMuscular once  insulin lispro (ADMELOG) corrective regimen sliding scale   SubCutaneous every 6 hours  lisinopril 20 milliGRAM(s) Oral daily  polyethylene glycol 3350 17 Gram(s) Oral daily  senna 2 Tablet(s) Oral at bedtime  sodium phosphate 15 milliMole(s)/250 mL IVPB 15 milliMole(s) IV Intermittent once      PHYSICAL EXAM:  T(C): 37.1 (11-30-23 @ 05:03), Max: 37.1 (11-29-23 @ 21:13)  HR: 78 (11-30-23 @ 05:03) (78 - 96)  BP: 162/80 (11-30-23 @ 05:03) (150/90 - 175/90)  RR: 18 (11-30-23 @ 05:03) (18 - 18)  SpO2: 95% (11-30-23 @ 05:03) (94% - 96%)  Wt(kg): --  I&O's Summary    29 Nov 2023 07:01  -  30 Nov 2023 07:00  --------------------------------------------------------  IN: 1480 mL / OUT: 2650 mL / NET: -1170 mL            JVP: Normal  Neck: supple  Lung: clear   CV: S1 S2 , Murmur:  Abd: soft  Ext: No edema  neuro: Awake / alert  Psych: flat affect      LABS/DATA:    CARDIAC MARKERS:                                9.7    10.97 )-----------( 334      ( 30 Nov 2023 04:39 )             31.7     11-30    134<L>  |  99  |  12  ----------------------------<  197<H>  3.7   |  26  |  0.45<L>    Ca    8.3<L>      30 Nov 2023 05:49  Phos  2.7     11-30  Mg     1.9     11-30      proBNP:   Lipid Profile:   HgA1c:   TSH:     TELE:  EKG:         Itraconazole Counseling:  I discussed with the patient the risks of itraconazole including but not limited to liver damage, nausea/vomiting, neuropathy, and severe allergy.  The patient understands that this medication is best absorbed when taken with acidic beverages such as non-diet cola or ginger ale.  The patient understands that monitoring is required including baseline LFTs and repeat LFTs at intervals.  The patient understands that they are to contact us or the primary physician if concerning signs are noted.

## 2023-11-30 NOTE — PRE-ANESTHESIA EVALUATION ADULT - NSRADCARDRESULTSFT_GEN_ALL_CORE
< from: TTE W or WO Ultrasound Enhancing Agent (11.20.23 @ 12:50) >    CONCLUSIONS:      1. Left ventricular cavity is small. Left ventricular wall thickness is normal. Left ventricular systolic function is mildly decreased with an ejection fraction visually estimated at 40 to 45 %.   2. Normal right ventricular cavity size, wall thickness, and systolic function.   3. No pericardial effusion seen.   4. Small pericardial effusion.   5. No prior echocardiogram is available for comparison.   6. Unable to exclude valvular vegetation in the setting of thickened or calcified valves and/or technically limited/difficult study. Transesophageal echocardiogram (MARINO) could be obtained for further evaluation.    < end of copied text >
Reviewed

## 2023-11-30 NOTE — PRE-ANESTHESIA EVALUATION ADULT - NSDENTALSD_ENT_ALL_CORE
denies loose teeth/missing teeth
No loose teeth/missing teeth
caps/bridge/implants
no upper teeth/missing teeth

## 2023-11-30 NOTE — BRIEF OPERATIVE NOTE - OPERATION/FINDINGS
R open BKA. Needs BID Dressing changes. No further hep drip needed. Abscess encountered. Cxs sent. Would broaden abx to include Gram - and Gram + species.
R above knee amputation with formalization. Incisional vac placed.

## 2023-11-30 NOTE — PROVIDER CONTACT NOTE (CRITICAL VALUE NOTIFICATION) - ASSESSMENT
Pt AOx4, vital signs stable, and not in distress. Foot pain being controlled with current pain regimen.
patient resting comfortably, VSS
Pt AxO 4, vital signs stable, and pain being controlled by current pain regimen.

## 2023-11-30 NOTE — CHART NOTE - NSCHARTNOTEFT_GEN_A_CORE
Post Operative Note  Patient: AIXA LARSON 63y (1960) Male   MRN: [id number]  Location: [facility] 9MON 912 D1  Visit: 11-15-23 Inpatient  Date: 11-30-23 @ 21:24    Procedure: S/P R above knee amputation with formalization and incisional vac placement.     Subjective: Patient seen and examined post operatively. Reports pain as controlled. Denies nausea, vomiting, fever, chills, chest pain, SOB, cough.      Objective:  Vitals: T(F): 98.1 (11-30-23 @ 20:00), Max: 98.7 (11-30-23 @ 05:03)  HR: 78 (11-30-23 @ 20:00)  BP: 120/68 (11-30-23 @ 20:00) (120/68 - 164/73)  RR: 18 (11-30-23 @ 20:00)  SpO2: 96% (11-30-23 @ 20:00)  Vent Settings:     In:   11-29-23 @ 07:01  -  11-30-23 @ 07:00  --------------------------------------------------------  IN: 1480 mL    11-30-23 @ 07:01  -  11-30-23 @ 21:24  --------------------------------------------------------  IN: 0 mL      IV Fluids:     Out:   11-29-23 @ 07:01  -  11-30-23 @ 07:00  --------------------------------------------------------  OUT: 2650 mL    11-30-23 @ 07:01  -  11-30-23 @ 21:24  --------------------------------------------------------  OUT: 610 mL      EBL:     Voided Urine:   11-29-23 @ 07:01  -  11-30-23 @ 07:00  --------------------------------------------------------  OUT: 2650 mL    11-30-23 @ 07:01  -  11-30-23 @ 21:24  --------------------------------------------------------  OUT: 610 mL      Physical Examination:  General: NAD, resting comfortably in bed  HEENT: Normocephalic atraumatic  Respiratory: Nonlabored respirations, normal CW expansion.   Cardio: pulse present  Abdomen: soft/nontender  Extremities: RLE S/P AKA, wound vac in place holding suction      Imaging:  No post-op imaging studies    Assessment:  63yMale patient S/P R above knee amputation with formalization and incisional vac placement on 11/30.     Plan:  - IV Abx: vanc  - Pain control PRN   - Diet: CC, regular  - DVT ppx: SCD's &     Date/Time: 11-30-23 @ 21:24 Post Operative Note  Patient: AIXA LARSON 63y (1960) Male   MRN: [id number]  Location: [facility] 9MON 912 D1  Visit: 11-15-23 Inpatient  Date: 11-30-23 @ 21:24    Procedure: S/P R above knee amputation with formalization and incisional vac placement.     Subjective: Patient seen and examined post operatively. Reports pain as controlled. Denies nausea, vomiting, fever, chills, chest pain, SOB, cough.      Objective:  Vitals: T(F): 98.1 (11-30-23 @ 20:00), Max: 98.7 (11-30-23 @ 05:03)  HR: 78 (11-30-23 @ 20:00)  BP: 120/68 (11-30-23 @ 20:00) (120/68 - 164/73)  RR: 18 (11-30-23 @ 20:00)  SpO2: 96% (11-30-23 @ 20:00)      Physical Examination:  General: NAD, resting comfortably in bed  HEENT: Normocephalic atraumatic  Respiratory: Nonlabored respirations, normal CW expansion.   Cardio: pulse present  Abdomen: soft/nontender  Extremities: RLE S/P AKA, wound vac in place holding suction, minimal output      Imaging:  No post-op imaging studies    Assessment:  63yMale patient S/P R above knee amputation with formalization and incisional vac placement on 11/30.     Plan:  - IV Abx: vanc  - Pain control PRN   - Diet: CC, regular    Vascular Surgery  [id number]     Date/Time: 11-30-23 @ 21:24

## 2023-11-30 NOTE — PROGRESS NOTE ADULT - SUBJECTIVE AND OBJECTIVE BOX
SURGERY DAILY PROGRESS NOTE:       SUBJECTIVE/ROS: Patient seen and evaluated on AM rounds.   Aware of OR for today. Has not eaten or drank.        OBJECTIVE:    Vital Signs Last 24 Hrs  T(C): 37.1 (30 Nov 2023 05:03), Max: 37.1 (29 Nov 2023 21:13)  T(F): 98.7 (30 Nov 2023 05:03), Max: 98.7 (29 Nov 2023 21:13)  HR: 78 (30 Nov 2023 05:03) (78 - 96)  BP: 162/80 (30 Nov 2023 05:03) (150/90 - 175/90)  BP(mean): --  RR: 18 (30 Nov 2023 05:03) (18 - 18)  SpO2: 95% (30 Nov 2023 05:03) (94% - 96%)    Parameters below as of 30 Nov 2023 05:03  Patient On (Oxygen Delivery Method): room air      I&O's Detail    29 Nov 2023 07:01  -  30 Nov 2023 07:00  --------------------------------------------------------  IN:    Oral Fluid: 1480 mL  Total IN: 1480 mL    OUT:    Indwelling Catheter - Urethral (mL): 2650 mL  Total OUT: 2650 mL    Total NET: -1170 mL        Daily     Daily   MEDICATIONS  (STANDING):  amLODIPine   Tablet 5 milliGRAM(s) Oral daily  chlorhexidine 2% Cloths 1 Application(s) Topical daily  chlorhexidine 2% Cloths 1 Application(s) Topical two times a day  dextrose 5%. 1000 milliLiter(s) (50 mL/Hr) IV Continuous <Continuous>  dextrose 5%. 1000 milliLiter(s) (100 mL/Hr) IV Continuous <Continuous>  dextrose 50% Injectable 25 Gram(s) IV Push once  dextrose 50% Injectable 25 Gram(s) IV Push once  dextrose 50% Injectable 12.5 Gram(s) IV Push once  gabapentin 300 milliGRAM(s) Oral three times a day  glucagon  Injectable 1 milliGRAM(s) IntraMuscular once  influenza   Vaccine 0.5 milliLiter(s) IntraMuscular once  insulin lispro (ADMELOG) corrective regimen sliding scale   SubCutaneous every 6 hours  lisinopril 20 milliGRAM(s) Oral daily  polyethylene glycol 3350 17 Gram(s) Oral daily  potassium chloride  10 mEq/100 mL IVPB 10 milliEquivalent(s) IV Intermittent every 1 hour  senna 2 Tablet(s) Oral at bedtime  sodium phosphate 15 milliMole(s)/250 mL IVPB 15 milliMole(s) IV Intermittent once    MEDICATIONS  (PRN):  acetaminophen     Tablet .. 650 milliGRAM(s) Oral every 6 hours PRN Temp greater or equal to 38C (100.4F)  dextrose Oral Gel 15 Gram(s) Oral once PRN Blood Glucose LESS THAN 70 milliGRAM(s)/deciliter  ondansetron Injectable 4 milliGRAM(s) IV Push once PRN Nausea and/or Vomiting  oxyCODONE    IR 5 milliGRAM(s) Oral every 6 hours PRN Severe Pain (7 - 10)      LABS:                        9.7    10.97 )-----------( 334      ( 30 Nov 2023 04:39 )             31.7     11-30    134<L>  |  99  |  12  ----------------------------<  197<H>  3.7   |  26  |  0.45<L>    Ca    8.3<L>      30 Nov 2023 05:49  Phos  2.7     11-30  Mg     1.9     11-30      PT/INR - ( 30 Nov 2023 04:39 )   PT: 12.2 sec;   INR: 1.11 ratio         PTT - ( 30 Nov 2023 04:39 )  PTT:27.0 sec  Urinalysis Basic - ( 30 Nov 2023 05:49 )    Color: x / Appearance: x / SG: x / pH: x  Gluc: 197 mg/dL / Ketone: x  / Bili: x / Urobili: x   Blood: x / Protein: x / Nitrite: x   Leuk Esterase: x / RBC: x / WBC x   Sq Epi: x / Non Sq Epi: x / Bacteria: x            PHYSICAL EXAM:  General Appearance: NAD, laying in bed   Chest: Nonlabored breathing on RA  Extremities: RLE s/p BKA, dressing C/D/I. LLE with SCD and Z flow boot

## 2023-11-30 NOTE — PROVIDER CONTACT NOTE (OTHER) - ACTION/TREATMENT ORDERED:
Provider notified. No intervention at this time. Continue to monitor.
No orders placed at this time, PA Erzing awaiting AM CBC results

## 2023-11-30 NOTE — PROGRESS NOTE ADULT - SUBJECTIVE AND OBJECTIVE BOX
Spoke with pt she stated that she will keep her scheduled appointment with  on 9/27.   Patient is a 63y old  Male who presents with a chief complaint of Rt foot pain (19 Nov 2023 08:24)      SUBJECTIVE / OVERNIGHT EVENTS: no events   s/p BKA   s    T(C): 36.7 (11-30-23 @ 22:00), Max: 36.9 (11-30-23 @ 17:13)  HR: 78 (11-30-23 @ 22:00) (67 - 81)  BP: 120/68 (11-30-23 @ 22:00) (120/68 - 156/71)  RR: 18 (11-30-23 @ 22:00) (16 - 18)  SpO2: 96% (11-30-23 @ 22:00) (95% - 98%)      MEDICATIONS  (STANDING):  amLODIPine   Tablet 5 milliGRAM(s) Oral daily  chlorhexidine 2% Cloths 1 Application(s) Topical daily  chlorhexidine 2% Cloths 1 Application(s) Topical two times a day  dextrose 5%. 1000 milliLiter(s) (100 mL/Hr) IV Continuous <Continuous>  dextrose 5%. 1000 milliLiter(s) (50 mL/Hr) IV Continuous <Continuous>  dextrose 50% Injectable 25 Gram(s) IV Push once  dextrose 50% Injectable 12.5 Gram(s) IV Push once  dextrose 50% Injectable 25 Gram(s) IV Push once  gabapentin 300 milliGRAM(s) Oral three times a day  glucagon  Injectable 1 milliGRAM(s) IntraMuscular once  influenza   Vaccine 0.5 milliLiter(s) IntraMuscular once  insulin lispro (ADMELOG) corrective regimen sliding scale   SubCutaneous three times a day before meals  insulin lispro (ADMELOG) corrective regimen sliding scale   SubCutaneous at bedtime  lisinopril 20 milliGRAM(s) Oral daily  polyethylene glycol 3350 17 Gram(s) Oral daily  senna 2 Tablet(s) Oral at bedtime  vancomycin  IVPB 1000 milliGRAM(s) IV Intermittent every 12 hours    MEDICATIONS  (PRN):  acetaminophen     Tablet .. 650 milliGRAM(s) Oral every 6 hours PRN Temp greater or equal to 38C (100.4F)  dextrose Oral Gel 15 Gram(s) Oral once PRN Blood Glucose LESS THAN 70 milliGRAM(s)/deciliter  ondansetron Injectable 4 milliGRAM(s) IV Push once PRN Nausea and/or Vomiting  oxyCODONE    IR 5 milliGRAM(s) Oral every 6 hours PRN Severe Pain (7 - 10)    PHYSICAL EXAM:  GENERAL: NAD, well-developed  HEAD:  Atraumatic, Normocephalic  EYES: EOMI,  conjunctiva and sclera clear  NECK: Supple, No JVD  CHEST/LUNG: Clear to auscultation bilaterally; No wheeze  HEART: Regular rate and rhythm; No murmurs, rubs, or gallops  ABDOMEN: Soft, Nontender, Nondistended; Bowel sounds present  EXTREMITIES:  s/p BKA   PSYCH: AAOx3  NEUROLOGY: non-focal  SKIN: No rashes or lesions                                            9.7    10.97 )-----------( 334      ( 30 Nov 2023 04:39 )             31.7             PT/INR - ( 30 Nov 2023 04:39 )   PT: 12.2 sec;   INR: 1.11 ratio         PTT - ( 30 Nov 2023 04:39 )  PTT:27.0 sec  134|99|12<197  3.7|26|0.45  8.3,1.9,2.7  11-30 @ 05:49  132|96|12<203  >9.0|23|0.46  <3.0,<.6,2.7  11-30 @ 04:39        CAPILLARY BLOOD GLUCOSE      POCT Blood Glucose.: 171 mg/dL (28 Nov 2023 22:08)  POCT Blood Glucose.: 238 mg/dL (28 Nov 2023 17:30)  POCT Blood Glucose.: 318 mg/dL (28 Nov 2023 12:06)  POCT Blood Glucose.: 233 mg/dL (28 Nov 2023 09:21)

## 2023-11-30 NOTE — PRE-ANESTHESIA EVALUATION ADULT - NSANTHPEFT_GEN_ALL_CORE
GENERAL: NAD  CHEST/LUNG: Clear to auscultation bilaterally  HEART: Normal S1/S2
Gen: No acute distress, cachetic/ill appearing  Pulm: breathing comfortably on room air  Cor: regular rate

## 2023-11-30 NOTE — PROGRESS NOTE ADULT - ASSESSMENT
63M PMHx HTN, DM, HLD, presented to ED from rehab for acute limb ischemia. Patient was discharged about 1 week ago from OSH to rehab for conservative medical treatment of foot infection and later brought to ED for increased pain and acute discoloration of foot. Does not weightbear on right leg and has not ambulated in months. Patient now s/p R kiel ESPARZA 11/19, recovering appropriately.     Recommendations:  - OR today, scheduled for 13:30   - care per medical team     Vascular Surgery   [id number]

## 2023-11-30 NOTE — PROVIDER CONTACT NOTE (OTHER) - ASSESSMENT
Pt li has new onset of blood tinged and pink colored urine. pt denies pain, BRYAN Santamaria made aware during rounds.

## 2023-11-30 NOTE — PROGRESS NOTE ADULT - SUBJECTIVE AND OBJECTIVE BOX
NEPHROLOGY-[facility] [phone]        Patient seen and examined in bed.  He was the same         MEDICATIONS  (STANDING):  amLODIPine   Tablet 5 milliGRAM(s) Oral daily  chlorhexidine 2% Cloths 1 Application(s) Topical two times a day  chlorhexidine 2% Cloths 1 Application(s) Topical daily  dextrose 5%. 1000 milliLiter(s) (50 mL/Hr) IV Continuous <Continuous>  dextrose 5%. 1000 milliLiter(s) (100 mL/Hr) IV Continuous <Continuous>  dextrose 50% Injectable 25 Gram(s) IV Push once  dextrose 50% Injectable 25 Gram(s) IV Push once  dextrose 50% Injectable 12.5 Gram(s) IV Push once  gabapentin 300 milliGRAM(s) Oral three times a day  glucagon  Injectable 1 milliGRAM(s) IntraMuscular once  influenza   Vaccine 0.5 milliLiter(s) IntraMuscular once  insulin lispro (ADMELOG) corrective regimen sliding scale   SubCutaneous every 6 hours  lisinopril 20 milliGRAM(s) Oral daily  polyethylene glycol 3350 17 Gram(s) Oral daily  senna 2 Tablet(s) Oral at bedtime  sodium phosphate 15 milliMole(s)/250 mL IVPB 15 milliMole(s) IV Intermittent once      VITAL:  T(C): , Max: 37.1 (11-29-23 @ 21:13)  T(F): , Max: 98.7 (11-29-23 @ 21:13)  HR: 79 (11-30-23 @ 09:56)  BP: 155/81 (11-30-23 @ 09:56)  BP(mean): --  RR: 18 (11-30-23 @ 09:56)  SpO2: 94% (11-30-23 @ 09:56)  Wt(kg): --    I and O's:    11-29 @ 07:01  -  11-30 @ 07:00  --------------------------------------------------------  IN: 1480 mL / OUT: 2650 mL / NET: -1170 mL    11-30 @ 07:01  -  11-30 @ 10:41  --------------------------------------------------------  IN: 0 mL / OUT: 400 mL / NET: -400 mL          PHYSICAL EXAM:    Constitutional: NAD  Neck:  No JVD  Respiratory: CTAB/L  Cardiovascular: S1 and S2  Gastrointestinal: BS+, soft, NT/ND  Extremities: No peripheral edema + right BKA   Neurological: A/O x 3, no focal deficits  Psychiatric: Normal mood, normal affect  : +  Pacheco  Skin: No rashes  Access: Not applicable    LABS:                        9.7    10.97 )-----------( 334      ( 30 Nov 2023 04:39 )             31.7     11-30    134<L>  |  99  |  12  ----------------------------<  197<H>  3.7   |  26  |  0.45<L>    Ca    8.3<L>      30 Nov 2023 05:49  Phos  2.7     11-30  Mg     1.9     11-30            Urine Studies:  Urinalysis Basic - ( 30 Nov 2023 05:49 )    Color: x / Appearance: x / SG: x / pH: x  Gluc: 197 mg/dL / Ketone: x  / Bili: x / Urobili: x   Blood: x / Protein: x / Nitrite: x   Leuk Esterase: x / RBC: x / WBC x   Sq Epi: x / Non Sq Epi: x / Bacteria: x            RADIOLOGY & ADDITIONAL STUDIES:

## 2023-11-30 NOTE — PROGRESS NOTE ADULT - ASSESSMENT
62 y/o male with pmhx htn, dm, hld  presents to the ED sent in from rehab center for right foot discoloration and pain  Hyponatremia   Metabolic acidosis   Wedge-shaped likely related  infection/pyelonephritis.  Hypertension   Hyponatremia     1 Renal - Resolved OLIVER;  Pacheco to gravity   Serum sodium is not significant    2 Vasc - SP guillotine BKA and dressing changes as well   3 ID -Vanco dose was decreased   4 GI-Nutritional support   5 CVS- BP is better on the Increase lisinopril   6 - TOV?      Sayed [facility]   [phone]

## 2023-11-30 NOTE — PROVIDER CONTACT NOTE (CRITICAL VALUE NOTIFICATION) - SITUATION
Growth in anerobic bottle: gram positive cocci in clusters
Growth in aerobic bottle: gram positive cocci in clusters in blood culture from 11/16
Mag <0.6, calcium <3, potassium >9.0

## 2023-11-30 NOTE — PROVIDER CONTACT NOTE (CRITICAL VALUE NOTIFICATION) - BACKGROUND
Hx of HTN, DM, HLD, presenting with R foot pain and discoloration.
63 yr M PMH HTN, DM, HLD, presenting with R foot pain and discoloration.
63 yr old M PMH HTN, DM, HLD presenting with right foot pain and discoloration.

## 2023-11-30 NOTE — PROVIDER CONTACT NOTE (CRITICAL VALUE NOTIFICATION) - TEST AND RESULT REPORTED:
Mag <0.6, calcium <3, potassium >9.0
Growth in aerobic bottle: gram positive cocci in clusters in blood culture from 11/16
Growth in anerobic bottle: gram positive cocci in clusters

## 2023-12-01 LAB
GLUCOSE BLDC GLUCOMTR-MCNC: 147 MG/DL — HIGH (ref 70–99)
GLUCOSE BLDC GLUCOMTR-MCNC: 147 MG/DL — HIGH (ref 70–99)
GLUCOSE BLDC GLUCOMTR-MCNC: 208 MG/DL — HIGH (ref 70–99)
GLUCOSE BLDC GLUCOMTR-MCNC: 208 MG/DL — HIGH (ref 70–99)
GLUCOSE BLDC GLUCOMTR-MCNC: 234 MG/DL — HIGH (ref 70–99)
GLUCOSE BLDC GLUCOMTR-MCNC: 234 MG/DL — HIGH (ref 70–99)
GLUCOSE BLDC GLUCOMTR-MCNC: 252 MG/DL — HIGH (ref 70–99)
GLUCOSE BLDC GLUCOMTR-MCNC: 252 MG/DL — HIGH (ref 70–99)
HCT VFR BLD CALC: 32.5 % — LOW (ref 39–50)
HCT VFR BLD CALC: 32.5 % — LOW (ref 39–50)
HGB BLD-MCNC: 9.9 G/DL — LOW (ref 13–17)
HGB BLD-MCNC: 9.9 G/DL — LOW (ref 13–17)
MCHC RBC-ENTMCNC: 25 PG — LOW (ref 27–34)
MCHC RBC-ENTMCNC: 25 PG — LOW (ref 27–34)
MCHC RBC-ENTMCNC: 30.5 GM/DL — LOW (ref 32–36)
MCHC RBC-ENTMCNC: 30.5 GM/DL — LOW (ref 32–36)
MCV RBC AUTO: 82.1 FL — SIGNIFICANT CHANGE UP (ref 80–100)
MCV RBC AUTO: 82.1 FL — SIGNIFICANT CHANGE UP (ref 80–100)
NRBC # BLD: 0 /100 WBCS — SIGNIFICANT CHANGE UP (ref 0–0)
NRBC # BLD: 0 /100 WBCS — SIGNIFICANT CHANGE UP (ref 0–0)
PLATELET # BLD AUTO: 249 K/UL — SIGNIFICANT CHANGE UP (ref 150–400)
PLATELET # BLD AUTO: 249 K/UL — SIGNIFICANT CHANGE UP (ref 150–400)
RBC # BLD: 3.96 M/UL — LOW (ref 4.2–5.8)
RBC # BLD: 3.96 M/UL — LOW (ref 4.2–5.8)
RBC # FLD: 17.8 % — HIGH (ref 10.3–14.5)
RBC # FLD: 17.8 % — HIGH (ref 10.3–14.5)
WBC # BLD: 11.02 K/UL — HIGH (ref 3.8–10.5)
WBC # BLD: 11.02 K/UL — HIGH (ref 3.8–10.5)
WBC # FLD AUTO: 11.02 K/UL — HIGH (ref 3.8–10.5)
WBC # FLD AUTO: 11.02 K/UL — HIGH (ref 3.8–10.5)

## 2023-12-01 PROCEDURE — 99232 SBSQ HOSP IP/OBS MODERATE 35: CPT

## 2023-12-01 PROCEDURE — 99221 1ST HOSP IP/OBS SF/LOW 40: CPT

## 2023-12-01 RX ORDER — ENOXAPARIN SODIUM 100 MG/ML
40 INJECTION SUBCUTANEOUS EVERY 24 HOURS
Refills: 0 | Status: DISCONTINUED | OUTPATIENT
Start: 2023-12-01 | End: 2023-12-08

## 2023-12-01 RX ADMIN — CHLORHEXIDINE GLUCONATE 1 APPLICATION(S): 213 SOLUTION TOPICAL at 08:11

## 2023-12-01 RX ADMIN — OXYCODONE HYDROCHLORIDE 5 MILLIGRAM(S): 5 TABLET ORAL at 22:45

## 2023-12-01 RX ADMIN — Medication 3: at 09:14

## 2023-12-01 RX ADMIN — GABAPENTIN 300 MILLIGRAM(S): 400 CAPSULE ORAL at 22:45

## 2023-12-01 RX ADMIN — ENOXAPARIN SODIUM 40 MILLIGRAM(S): 100 INJECTION SUBCUTANEOUS at 17:22

## 2023-12-01 RX ADMIN — OXYCODONE HYDROCHLORIDE 5 MILLIGRAM(S): 5 TABLET ORAL at 11:05

## 2023-12-01 RX ADMIN — SENNA PLUS 2 TABLET(S): 8.6 TABLET ORAL at 22:44

## 2023-12-01 RX ADMIN — AMLODIPINE BESYLATE 5 MILLIGRAM(S): 2.5 TABLET ORAL at 06:04

## 2023-12-01 RX ADMIN — OXYCODONE HYDROCHLORIDE 5 MILLIGRAM(S): 5 TABLET ORAL at 23:45

## 2023-12-01 RX ADMIN — GABAPENTIN 300 MILLIGRAM(S): 400 CAPSULE ORAL at 06:05

## 2023-12-01 RX ADMIN — LISINOPRIL 20 MILLIGRAM(S): 2.5 TABLET ORAL at 06:05

## 2023-12-01 RX ADMIN — CHLORHEXIDINE GLUCONATE 1 APPLICATION(S): 213 SOLUTION TOPICAL at 17:22

## 2023-12-01 RX ADMIN — OXYCODONE HYDROCHLORIDE 5 MILLIGRAM(S): 5 TABLET ORAL at 11:30

## 2023-12-01 RX ADMIN — Medication 250 MILLIGRAM(S): at 06:05

## 2023-12-01 RX ADMIN — GABAPENTIN 300 MILLIGRAM(S): 400 CAPSULE ORAL at 13:05

## 2023-12-01 RX ADMIN — POLYETHYLENE GLYCOL 3350 17 GRAM(S): 17 POWDER, FOR SOLUTION ORAL at 11:05

## 2023-12-01 RX ADMIN — CHLORHEXIDINE GLUCONATE 1 APPLICATION(S): 213 SOLUTION TOPICAL at 11:13

## 2023-12-01 RX ADMIN — Medication 2: at 12:12

## 2023-12-01 NOTE — PHYSICAL THERAPY INITIAL EVALUATION ADULT - BED MOBILITY TRAINING, PT EVAL
GOAL: Patient will perform bed mobility with supervision in 2 weeks
Patient will perform bed mobility independently, in 4 weeks.

## 2023-12-01 NOTE — PROGRESS NOTE ADULT - ASSESSMENT
63M PMHx HTN, DM, HLD, presented to ED from rehab for acute limb ischemia. Patient was discharged about 1 week ago from OSH to rehab for conservative medical treatment of foot infection and later brought to ED for increased pain and acute discoloration of foot. Does not weightbear on right leg and has not ambulated in months. Patient now s/p R kiel ESPARZA 11/19, recovering appropriately.     Recommendations:  - s/p R AKA  - vasc therapy - down Monday  - pain control  - care per medical team     Vascular Surgery   [id number]

## 2023-12-01 NOTE — PHYSICAL THERAPY INITIAL EVALUATION ADULT - GENERAL OBSERVATIONS, REHAB EVAL
Patient received semi reclined in bed, NAD, VSS, +PIV infusing, +right LE BKA wrap c/d/i, son present t/o
patient received supine in bed

## 2023-12-01 NOTE — CONSULT NOTE ADULT - CONSULT REASON
Gangrene
OLIVER
Cardiac eval
Acute Limb Ischemia
Evaluate aspiration risks
Right foot wound
Sacral/bilateral buttocks skin damage

## 2023-12-01 NOTE — CONSULT NOTE ADULT - ASSESSMENT
Impression:    Sacral/bilateral Buttocks unstageable presure injury present on admission  Incontinence of bowel and bladder  Incontinence Dermatitis    Recommend:  1.) topical therapy: sacral/buttock injury - cleanse with incontinence cleanser, pat dry, apply Medihoney paste, cover with an allevyn foam dressing daily  2.) Incontinence Management - incontinence cleanser, pads, pericare BID  3.) Maintain on an alternating air with low air loss surface  4.) Turn and reposition Q 2 hours  5.) Nutrition optimization - please add Jerry  6.) Offload heels/feet with complete cair air fluidized boots/allen lock pillow; ensure that the soles of the feet are not resting on the foot board of the bed.  7.) Chair cushion for chair sitting    Care as per medicine. Will not actively follow but will remain available. Please recall for new issues or deterioration.  Upon [facility] [address] [place] [address] [phone]  Thank you for this consult  Seen and discussed with clinical nurse  Maria Mendoza, CHUCKY-C, CWOCN via Teams

## 2023-12-01 NOTE — BH CONSULTATION LIAISON ASSESSMENT NOTE - RISK ASSESSMENT
Risk factors: pain, recent loss    Protective factors: no current SI/HI, no hx SA/SIB, no prior psych admissions, no access to weapons, no psychosis, engaged in work    Overall, pt is a low risk of harm to self/others and does not meet criteria for psychiatric admission.

## 2023-12-01 NOTE — BH CONSULTATION LIAISON ASSESSMENT NOTE - NSBHCONSULTRECOMMENDOTHER_PSY_A_CORE FT
- Consider PT to work on ambulation/OOB when possible  - Refer to outpatient psychotherapy  - Duloextine 30 mg QD for mood and pain   - Consider PT to work on ambulation/OOB when possible  - Refer to outpatient psychotherapy

## 2023-12-01 NOTE — PROGRESS NOTE ADULT - SUBJECTIVE AND OBJECTIVE BOX
NEPHROLOGY-NSN [phone]        Patient seen and examined in bed.  He was the same         MEDICATIONS  (STANDING):  amLODIPine   Tablet 5 milliGRAM(s) Oral daily  chlorhexidine 2% Cloths 1 Application(s) Topical two times a day  chlorhexidine 2% Cloths 1 Application(s) Topical daily  dextrose 5%. 1000 milliLiter(s) (50 mL/Hr) IV Continuous <Continuous>  dextrose 5%. 1000 milliLiter(s) (100 mL/Hr) IV Continuous <Continuous>  dextrose 50% Injectable 12.5 Gram(s) IV Push once  dextrose 50% Injectable 25 Gram(s) IV Push once  dextrose 50% Injectable 25 Gram(s) IV Push once  gabapentin 300 milliGRAM(s) Oral three times a day  glucagon  Injectable 1 milliGRAM(s) IntraMuscular once  influenza   Vaccine 0.5 milliLiter(s) IntraMuscular once  insulin lispro (ADMELOG) corrective regimen sliding scale   SubCutaneous three times a day before meals  insulin lispro (ADMELOG) corrective regimen sliding scale   SubCutaneous at bedtime  lisinopril 20 milliGRAM(s) Oral daily  polyethylene glycol 3350 17 Gram(s) Oral daily  senna 2 Tablet(s) Oral at bedtime  vancomycin  IVPB 1000 milliGRAM(s) IV Intermittent every 12 hours      VITAL:  T(C): , Max: 37.1 (12-01-23 @ 09:45)  T(F): , Max: 98.7 (12-01-23 @ 09:45)  HR: 83 (12-01-23 @ 09:45)  BP: 106/64 (12-01-23 @ 09:45)  BP(mean): 105 (11-30-23 @ 18:00)  RR: 18 (12-01-23 @ 09:45)  SpO2: 92% (12-01-23 @ 09:45)  Wt(kg): --    I and O's:    11-30 @ 07:01  -  12-01 @ 07:00  --------------------------------------------------------  IN: 550 mL / OUT: 1760 mL / NET: -1210 mL      Height (cm): 170.2 (11-30 @ 14:27)  Weight (kg): 64.7 (11-30 @ 14:27)  BMI (kg/m2): 22.3 (11-30 @ 14:27)  BSA (m2): 1.75 (11-30 @ 14:27)    PHYSICAL EXAM:    Constitutional: NAD; cachetic   Neck:  No JVD  Respiratory: CTAB/L  Cardiovascular: S1 and S2  Gastrointestinal: BS+, soft, NT/ND  Extremities: No peripheral edema + amputee   Neurological: A/O x 3, no focal deficits  Psychiatric: Normal mood, normal affect  : +  Pacheco  Skin: No rashes  Access: Not applicable    LABS:                        9.9    11.02 )-----------( 249      ( 01 Dec 2023 07:12 )             32.5     11-30    134<L>  |  99  |  12  ----------------------------<  197<H>  3.7   |  26  |  0.45<L>    Ca    8.3<L>      30 Nov 2023 05:49  Phos  2.7     11-30  Mg     1.9     11-30            Urine Studies:  Urinalysis Basic - ( 30 Nov 2023 05:49 )    Color: x / Appearance: x / SG: x / pH: x  Gluc: 197 mg/dL / Ketone: x  / Bili: x / Urobili: x   Blood: x / Protein: x / Nitrite: x   Leuk Esterase: x / RBC: x / WBC x   Sq Epi: x / Non Sq Epi: x / Bacteria: x            RADIOLOGY & ADDITIONAL STUDIES:

## 2023-12-01 NOTE — PHYSICAL THERAPY INITIAL EVALUATION ADULT - IMPAIRMENTS FOUND, PT EVAL
DISPLAY PLAN FREE TEXT
aerobic capacity/endurance/gait, locomotion, and balance/muscle strength
aerobic capacity/endurance/gait, locomotion, and balance/muscle strength

## 2023-12-01 NOTE — PROGRESS NOTE ADULT - SUBJECTIVE AND OBJECTIVE BOX
Subjective: Patient seen and examined. No new events except as noted.     SUBJECTIVE/ROS:  nad      MEDICATIONS:  MEDICATIONS  (STANDING):  amLODIPine   Tablet 5 milliGRAM(s) Oral daily  chlorhexidine 2% Cloths 1 Application(s) Topical daily  chlorhexidine 2% Cloths 1 Application(s) Topical two times a day  dextrose 5%. 1000 milliLiter(s) (50 mL/Hr) IV Continuous <Continuous>  dextrose 5%. 1000 milliLiter(s) (100 mL/Hr) IV Continuous <Continuous>  dextrose 50% Injectable 12.5 Gram(s) IV Push once  dextrose 50% Injectable 25 Gram(s) IV Push once  dextrose 50% Injectable 25 Gram(s) IV Push once  gabapentin 300 milliGRAM(s) Oral three times a day  glucagon  Injectable 1 milliGRAM(s) IntraMuscular once  influenza   Vaccine 0.5 milliLiter(s) IntraMuscular once  insulin lispro (ADMELOG) corrective regimen sliding scale   SubCutaneous three times a day before meals  insulin lispro (ADMELOG) corrective regimen sliding scale   SubCutaneous at bedtime  lisinopril 20 milliGRAM(s) Oral daily  polyethylene glycol 3350 17 Gram(s) Oral daily  senna 2 Tablet(s) Oral at bedtime  vancomycin  IVPB 1000 milliGRAM(s) IV Intermittent every 12 hours      PHYSICAL EXAM:  T(C): 36.6 (12-01-23 @ 05:18), Max: 36.9 (11-30-23 @ 17:13)  HR: 74 (12-01-23 @ 05:18) (67 - 84)  BP: 146/81 (12-01-23 @ 05:18) (120/68 - 156/71)  RR: 18 (12-01-23 @ 05:18) (16 - 18)  SpO2: 95% (12-01-23 @ 05:18) (94% - 98%)  Wt(kg): --  I&O's Summary    30 Nov 2023 07:01  -  01 Dec 2023 07:00  --------------------------------------------------------  IN: 550 mL / OUT: 1760 mL / NET: -1210 mL      Height (cm): 170.2 (11-30 @ 14:27)  Weight (kg): 64.7 (11-30 @ 14:27)  BMI (kg/m2): 22.3 (11-30 @ 14:27)  BSA (m2): 1.75 (11-30 @ 14:27)      JVP: Normal  Neck: supple  Lung: clear   CV: S1 S2 , Murmur:  Abd: soft  Ext: No edema  neuro: Awake / alert  Psych: flat affect      LABS/DATA:    CARDIAC MARKERS:                                9.9    11.02 )-----------( 249      ( 01 Dec 2023 07:12 )             32.5     11-30    134<L>  |  99  |  12  ----------------------------<  197<H>  3.7   |  26  |  0.45<L>    Ca    8.3<L>      30 Nov 2023 05:49  Phos  2.7     11-30  Mg     1.9     11-30      proBNP:   Lipid Profile:   HgA1c:   TSH:     TELE:  EKG:

## 2023-12-01 NOTE — PHYSICAL THERAPY INITIAL EVALUATION ADULT - GAIT TRAINING, PT EVAL
GOAL: Patient will ambulate 25 feet with min A with RW in 2 weeks
Patient will ambulate 300 feet independently with appropriate assistive device as needed, in 4 weeks.

## 2023-12-01 NOTE — PROGRESS NOTE ADULT - ASSESSMENT
63 M pmhx htn, dm, hld presents to ED from rehab for acute limb ischemia.    Acute Limb  Ischemia   CTA A/P with b/l LE run-noff significant for right external iliac artery occlusion with no reconstitution of flow to the distal RLE. 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     s/p BKA   Repeat Cx  bacteremia    Completed Vancomycin       Vascular Podiatry following   Dnetal eval appreciated   MARINO no vegetation     s/p Tooth extraction     Patient medically optimized for the procedure       Wound Care consult appreciated   Cards eval appreciated     OLIVER Contrats induced    Pacheco   Urine retention       DM HISS   A1C     HTN Home meds   Planning for formalization   Patient medically optimized for the procedure

## 2023-12-01 NOTE — BH CONSULTATION LIAISON ASSESSMENT NOTE - NSBHATTESTCOMMENTATTENDFT_PSY_A_CORE
63 M pmhx htn, dm, hld who presented to [facility] from rehab for acute limb ischemia, now s/p R BKA. Psychiatry consulted for ?MDD and pain management.  Patient stated he had an okay mood, but was in severe shooting pain from his right leg that worsens when he moves the limb. Endorsed poor sleep and has been NPO since yesterday awaiting TTE. He has not used opioids or gabapentin previously. Denies current SI/HI/perceptual disturbances. He has no psychiatric treatment or hospitalization history. He has no history of suicide attempts, no access to weapons, no family history of SMI. He is amenable to outpatient therapy for adjustment following his BKA. He does not feel strongly about starting medication for mood. can f/u as needed. 
This is a 63-y.o. CM patient s/p BKA 2 weeks ago, revision 2 days ago, with depressed mood since death of wife 2 years ago, worse since surgeries, low energy, concerns about medical prognosis, wanting to know when he will be able to get out of bed, when he will be discharged.    This patient's depressed mood and low energy are most consistent with adjustment disorder with depressed mood. Given no sleep disturbance, no lack of interest in usual activities, no guilt, no concentration change, no appetite change, no suicidal ideation does not meet criteria for depressive episode.    I have seen and evaluated this patient myself, covering for Dr. Lamar. Chart, labs, meds reviewed. I agree with trainee's assessment and plan. This is a follow up note and will be billed as such.

## 2023-12-01 NOTE — PROGRESS NOTE ADULT - SUBJECTIVE AND OBJECTIVE BOX
Patient is a 63y old  Male who presents with a chief complaint of Rt foot pain (19 Nov 2023 08:24)      SUBJECTIVE / OVERNIGHT EVENTS: no events   s/p BKA   s    T(C): 36.7 (11-30-23 @ 22:00), Max: 36.9 (11-30-23 @ 17:13)  HR: 78 (11-30-23 @ 22:00) (67 - 81)  BP: 120/68 (11-30-23 @ 22:00) (120/68 - 156/71)  RR: 18 (11-30-23 @ 22:00) (16 - 18)  SpO2: 96% (11-30-23 @ 22:00) (95% - 98%)      MEDICATIONS  (STANDING):  amLODIPine   Tablet 5 milliGRAM(s) Oral daily  chlorhexidine 2% Cloths 1 Application(s) Topical daily  chlorhexidine 2% Cloths 1 Application(s) Topical two times a day  dextrose 5%. 1000 milliLiter(s) (100 mL/Hr) IV Continuous <Continuous>  dextrose 5%. 1000 milliLiter(s) (50 mL/Hr) IV Continuous <Continuous>  dextrose 50% Injectable 25 Gram(s) IV Push once  dextrose 50% Injectable 12.5 Gram(s) IV Push once  dextrose 50% Injectable 25 Gram(s) IV Push once  gabapentin 300 milliGRAM(s) Oral three times a day  glucagon  Injectable 1 milliGRAM(s) IntraMuscular once  influenza   Vaccine 0.5 milliLiter(s) IntraMuscular once  insulin lispro (ADMELOG) corrective regimen sliding scale   SubCutaneous three times a day before meals  insulin lispro (ADMELOG) corrective regimen sliding scale   SubCutaneous at bedtime  lisinopril 20 milliGRAM(s) Oral daily  polyethylene glycol 3350 17 Gram(s) Oral daily  senna 2 Tablet(s) Oral at bedtime  vancomycin  IVPB 1000 milliGRAM(s) IV Intermittent every 12 hours    MEDICATIONS  (PRN):  acetaminophen     Tablet .. 650 milliGRAM(s) Oral every 6 hours PRN Temp greater or equal to 38C (100.4F)  dextrose Oral Gel 15 Gram(s) Oral once PRN Blood Glucose LESS THAN 70 milliGRAM(s)/deciliter  ondansetron Injectable 4 milliGRAM(s) IV Push once PRN Nausea and/or Vomiting  oxyCODONE    IR 5 milliGRAM(s) Oral every 6 hours PRN Severe Pain (7 - 10)    PHYSICAL EXAM:  GENERAL: NAD, well-developed  HEAD:  Atraumatic, Normocephalic  EYES: EOMI,  conjunctiva and sclera clear  NECK: Supple, No JVD  CHEST/LUNG: Clear to auscultation bilaterally; No wheeze  HEART: Regular rate and rhythm; No murmurs, rubs, or gallops  ABDOMEN: Soft, Nontender, Nondistended; Bowel sounds present  EXTREMITIES:  s/p BKA   PSYCH: AAOx3  NEUROLOGY: non-focal  SKIN: No rashes or lesions                                            9.7    10.97 )-----------( 334      ( 30 Nov 2023 04:39 )             31.7             PT/INR - ( 30 Nov 2023 04:39 )   PT: 12.2 sec;   INR: 1.11 ratio         PTT - ( 30 Nov 2023 04:39 )  PTT:27.0 sec  134|99|12<197  3.7|26|0.45  8.3,1.9,2.7  11-30 @ 05:49  132|96|12<203  >9.0|23|0.46  <3.0,<.6,2.7  11-30 @ 04:39        CAPILLARY BLOOD GLUCOSE      POCT Blood Glucose.: 171 mg/dL (28 Nov 2023 22:08)  POCT Blood Glucose.: 238 mg/dL (28 Nov 2023 17:30)  POCT Blood Glucose.: 318 mg/dL (28 Nov 2023 12:06)  POCT Blood Glucose.: 233 mg/dL (28 Nov 2023 09:21)

## 2023-12-01 NOTE — PROGRESS NOTE ADULT - SUBJECTIVE AND OBJECTIVE BOX
Vascular Surgery Progress Note  Patient is a 63y old  Male who presents with a chief complaint of Rt foot pain (01 Dec 2023 08:05)      INTERVAL EVENTS: No acute events overnight.  SUBJECTIVE: Patient seen and examined at bedside with surgical team, patient without complaints. Denies fever, chills, CP, SOB nausea, vomiting, abdominal pain.      OBJECTIVE:    Vital Signs Last 24 Hrs  T(C): 36.6 (01 Dec 2023 05:18), Max: 36.9 (30 Nov 2023 17:13)  T(F): 97.9 (01 Dec 2023 05:18), Max: 98.4 (30 Nov 2023 17:13)  HR: 74 (01 Dec 2023 05:18) (67 - 84)  BP: 146/81 (01 Dec 2023 05:18) (120/68 - 156/71)  BP(mean): 105 (30 Nov 2023 18:00) (97 - 108)  RR: 18 (01 Dec 2023 05:18) (16 - 18)  SpO2: 95% (01 Dec 2023 05:18) (94% - 98%)    Parameters below as of 01 Dec 2023 05:18  Patient On (Oxygen Delivery Method): room air    I&O's Detail    30 Nov 2023 07:01  -  01 Dec 2023 07:00  --------------------------------------------------------  IN:    Oral Fluid: 550 mL  Total IN: 550 mL    OUT:    Indwelling Catheter - Urethral (mL): 1760 mL  Total OUT: 1760 mL    Total NET: -1210 mL      MEDICATIONS  (STANDING):  amLODIPine   Tablet 5 milliGRAM(s) Oral daily  chlorhexidine 2% Cloths 1 Application(s) Topical daily  chlorhexidine 2% Cloths 1 Application(s) Topical two times a day  dextrose 5%. 1000 milliLiter(s) (100 mL/Hr) IV Continuous <Continuous>  dextrose 5%. 1000 milliLiter(s) (50 mL/Hr) IV Continuous <Continuous>  dextrose 50% Injectable 25 Gram(s) IV Push once  dextrose 50% Injectable 12.5 Gram(s) IV Push once  dextrose 50% Injectable 25 Gram(s) IV Push once  gabapentin 300 milliGRAM(s) Oral three times a day  glucagon  Injectable 1 milliGRAM(s) IntraMuscular once  influenza   Vaccine 0.5 milliLiter(s) IntraMuscular once  insulin lispro (ADMELOG) corrective regimen sliding scale   SubCutaneous three times a day before meals  insulin lispro (ADMELOG) corrective regimen sliding scale   SubCutaneous at bedtime  lisinopril 20 milliGRAM(s) Oral daily  polyethylene glycol 3350 17 Gram(s) Oral daily  senna 2 Tablet(s) Oral at bedtime  vancomycin  IVPB 1000 milliGRAM(s) IV Intermittent every 12 hours    MEDICATIONS  (PRN):  acetaminophen     Tablet .. 650 milliGRAM(s) Oral every 6 hours PRN Temp greater or equal to 38C (100.4F)  dextrose Oral Gel 15 Gram(s) Oral once PRN Blood Glucose LESS THAN 70 milliGRAM(s)/deciliter  ondansetron Injectable 4 milliGRAM(s) IV Push once PRN Nausea and/or Vomiting  oxyCODONE    IR 5 milliGRAM(s) Oral every 6 hours PRN Severe Pain (7 - 10)      PHYSICAL EXAM:  Constitutional: A&Ox3, NAD  Respiratory: Unlabored breathing  Abdomen: Soft, nondistended, NTTP. No rebound or guarding.  Extremities: WWP, MCNEILL spontaneously, R AKA stump soft, no palpable collections, vac in place holding suction with scant s/s output    LABS:                        9.9    11.02 )-----------( 249      ( 01 Dec 2023 07:12 )             32.5     11-30    134<L>  |  99  |  12  ----------------------------<  197<H>  3.7   |  26  |  0.45<L>    Ca    8.3<L>      30 Nov 2023 05:49  Phos  2.7     11-30  Mg     1.9     11-30      PT/INR - ( 30 Nov 2023 04:39 )   PT: 12.2 sec;   INR: 1.11 ratio         PTT - ( 30 Nov 2023 04:39 )  PTT:27.0 sec    Urinalysis Basic - ( 30 Nov 2023 05:49 )    Color: x / Appearance: x / SG: x / pH: x  Gluc: 197 mg/dL / Ketone: x  / Bili: x / Urobili: x   Blood: x / Protein: x / Nitrite: x   Leuk Esterase: x / RBC: x / WBC x   Sq Epi: x / Non Sq Epi: x / Bacteria: x          IMAGING:

## 2023-12-01 NOTE — BH CONSULTATION LIAISON ASSESSMENT NOTE - NSSUICPROTFACT_PSY_ALL_CORE
Fear of death or the actual act of killing self/Cultural, spiritual and/or moral attitudes against suicide/Ability to cope with stress
Fear of death or the actual act of killing self/Cultural, spiritual and/or moral attitudes against suicide/Ability to cope with stress

## 2023-12-01 NOTE — BH CONSULTATION LIAISON ASSESSMENT NOTE - HPI (INCLUDE ILLNESS QUALITY, SEVERITY, DURATION, TIMING, CONTEXT, MODIFYING FACTORS, ASSOCIATED SIGNS AND SYMPTOMS)
62 yo M s/p BKA on 11/19 and revision on 11/30 now endorsing depressed mood. He states that his mood has been low since his wife passed away in February 2021, however he has had no sleep disturbance, lack of interest in usual activities, guilt, energy change, concentration change, appetite change or suicidal ideation. He has continued to function well and to work in his job as a . He has never seen an outpatient psychiatrist or therapist.    10/15 he presented to [facility] ED from outpatient rehab, where he had been being treated for foot infection, with right foot discoloration and pain. He was found to have acute limb ischemia and underwent BKA. Since the surgery his mood has been low. He endorses low energy. He denies sleep disturbance, lack of interest in usual activities, guilt, energy change, concentration change, appetite change or suicidal ideation.    When asked about his mood he states "I want this to end." When asked what he wants to end he says "Not knowing when I'm going to get better." He further attributes his low mood to not knowing when he will be discharged, not knowing when he will be able to get out of bed. He denies any suicidal ideation or homicidal ideation.    He has 2 sons, and has only been visited by one of them, one time, in the 2 weeks he has been in the hospital. He reports that his pain is well controlled at rest but that it is very painful when he tries to move. He denies other concerns.
As per H&P: This is a 63 M pmhx htn, dm, hld who presented to [facility] from rehab for acute limb ischemia, now s/p R BKA. Psychiatry consulted for ?MDD and pain management.  Patient stated he had an okay mood, but was in severe shooting pain from his right leg that worsens when he moves the limb. Endorsed poor sleep and has been NPO since yesterday awaiting TTE. He has not used opioids or gabapentin previously. Denies current SI/HI/perceptual disturbances. He has no psychiatric treatment or hospitalization history. He has no history of suicide attempts, no access to weapons, no family history of SMI. He is amenable to outpatient therapy for adjustment following his BKA. He does not feel strongly about starting medication for mood.   Interview terminated early due to patient's transfer for emergent TTE. Unable to assess substance use history secondary to abbreviated interview.

## 2023-12-01 NOTE — PROGRESS NOTE ADULT - ASSESSMENT
62 y/o male with pmhx htn, dm, hld  presents to the ED sent in from rehab center for right foot discoloration and pain  Hyponatremia   Metabolic acidosis   Wedge-shaped likely related  infection/pyelonephritis.  Hypertension   Hyponatremia     1 Renal - Resolved OLIVER;  Pacheco to gravity   Serum sodium is not significant    2 Vasc - SP guillotine BKA and dressing changes as well;  OR today for formalization   3 ID -Vanco dose was decreased   4 GI-Nutritional support   5 CVS- BP is stable       [facility]   [phone]

## 2023-12-01 NOTE — CONSULT NOTE ADULT - SUBJECTIVE AND OBJECTIVE BOX
Wound Surgery Consult Note:    HPI:  64 y/o male with pmhx htn, dm, hld  presents to the ED sent in from rehab center for right foot discoloration and pain. Patient states that he was discharged about 1 week ago from outside hospital after being treated for infection to right foot. He has been in rehab center for about 1 week. States today nurses looked at his foot and sent him to [facility] ED for evaluation. Patient has not been on any abx for foot while in rehab.   No hx  fevers, chills, chest pain, cough, n/v/d. (15 Nov 2023 18:28)    Request for wound care consult for sacral/bilateral buttocks skin breakdown received from nursing. Mr. Birmingham was encountered on an alternating air with low air loss surface. He is incontinent of stool and urine.   His extreme immobility, inactivity, incontinence of stool as well as poor nutritional status all contribute to his high risk for pressure injury development and hinder healing. Injury was noted on admission.    Principles of pressure injury prevention and treatment including but not limited to offloading and turning and repositioning review with patient.     PAST MEDICAL & SURGICAL HISTORY:  Hypertension  Diabetes    REVIEW OF SYSTEMS:    Constitutional:       [x] negative [ ] fevers [ ] chills [ ] weight loss [ ] weight gain  [ ] fatigue  HEENT:                  [x ] negative [ ] dry eyes [ ] eye irritation [ ] postnasal drip [ ] nasal congestion   CV:                         [x ] negative  [ ] chest pain [ ] orthopnea [ ] palpitations [ ] tachycardia  Resp:                     [ x] negative [ ] cough [ ] shortness of breath [ ] dyspnea [ ] wheezing [ ] sputum [ ] hemoptysis  GI:                          [ ] negative [ ] nausea [ ] vomiting [ ] diarrhea [ ] constipation [ ] abd pain [ ] dysphagia [x ] incontinent of bowel  :                        [ ] negative [ ] dysuria [ ] nocturia [ ] hematuria [ ] increased urinary frequency [ x] incontinent of urine  Musculoskeletal:     [ ] negative [ ] back pain [ ] myalgias [ ] arthralgias [ ] fracture [ ] ambulatory  [x ] non-ambulatory  Skin:                       [ ] negative [ ] rash [ ] itch [x ] wound [ ] skin discoloration  Neurological:        [x ] negative [ ] headache [ ] dizziness [ ] syncope [ ] weakness [ ] numbness  Psychiatric:           [x ] negative [ ] anxiety [ ] depression  Endocrine:            [ ] negative [x ] diabetes [ ] thyroid problem  Heme/Lymph:       [x] negative [ ] anemia [ ] bleeding problem  Allergic/Immune:   [x ] negative [ ] itchy eyes [ ] nasal discharge [ ] hives [ ] angioedema    [x ] All other systems negative    MEDICATIONS  (STANDING):  amLODIPine   Tablet 5 milliGRAM(s) Oral daily  chlorhexidine 2% Cloths 1 Application(s) Topical daily  chlorhexidine 2% Cloths 1 Application(s) Topical two times a day  dextrose 5%. 1000 milliLiter(s) (100 mL/Hr) IV Continuous <Continuous>  dextrose 5%. 1000 milliLiter(s) (50 mL/Hr) IV Continuous <Continuous>  dextrose 50% Injectable 12.5 Gram(s) IV Push once  dextrose 50% Injectable 25 Gram(s) IV Push once  dextrose 50% Injectable 25 Gram(s) IV Push once  enoxaparin Injectable 40 milliGRAM(s) SubCutaneous every 24 hours  gabapentin 300 milliGRAM(s) Oral three times a day  glucagon  Injectable 1 milliGRAM(s) IntraMuscular once  influenza   Vaccine 0.5 milliLiter(s) IntraMuscular once  insulin lispro (ADMELOG) corrective regimen sliding scale   SubCutaneous three times a day before meals  insulin lispro (ADMELOG) corrective regimen sliding scale   SubCutaneous at bedtime  lisinopril 20 milliGRAM(s) Oral daily  polyethylene glycol 3350 17 Gram(s) Oral daily  senna 2 Tablet(s) Oral at bedtime  vancomycin  IVPB 1000 milliGRAM(s) IV Intermittent every 12 hours    MEDICATIONS  (PRN):  acetaminophen     Tablet .. 650 milliGRAM(s) Oral every 6 hours PRN Temp greater or equal to 38C (100.4F)  dextrose Oral Gel 15 Gram(s) Oral once PRN Blood Glucose LESS THAN 70 milliGRAM(s)/deciliter  ondansetron Injectable 4 milliGRAM(s) IV Push once PRN Nausea and/or Vomiting  oxyCODONE    IR 5 milliGRAM(s) Oral every 6 hours PRN Severe Pain (7 - 10)    Allergies    penicillin (Anaphylaxis)    Intolerances    SOCIAL HISTORY:  , current smoker    FAMILY HISTORY:  No pertinent family history in first degree relatives    Vital Signs Last 24 Hrs  T(C): 37.1 (01 Dec 2023 09:45), Max: 37.1 (01 Dec 2023 09:45)  T(F): 98.7 (01 Dec 2023 09:45), Max: 98.7 (01 Dec 2023 09:45)  HR: 83 (01 Dec 2023 09:45) (67 - 84)  BP: 106/64 (01 Dec 2023 09:45) (106/64 - 156/71)  BP(mean): 105 (30 Nov 2023 18:00) (97 - 108)  RR: 18 (01 Dec 2023 09:45) (16 - 18)  SpO2: 92% (01 Dec 2023 09:45) (92% - 98%)    Parameters below as of 01 Dec 2023 09:45  Patient On (Oxygen Delivery Method): room air    PHYSICAL EXAM:  General: alert, WN  Ophthamology: clear sclera, no drainage  ENMT: neck supple, mucosal membranes moist  Respiratory: BL chest rise equal, no accessory muscle use  : draining clear yellow urine  GI: abd ND/NT  Neurology: verbal, following commands  Vascular: BLE edema equal  MSK: no contractions  Psych: calm, appropriate  Skin: Sacrum/bilateral buttocks with central area of soft, moist, black slough surrounded by superficially denuded skin in and around the gluteal cleft, L 8cm x W 7.5cm X D unknown, small amount of serosanguinous drainage  No erythema, induration, fluctuance, increase warmth, tenderness    LABS:  11-30    134<L>  |  99  |  12  ----------------------------<  197<H>  3.7   |  26  |  0.45<L>    Ca    8.3<L>      30 Nov 2023 05:49  Phos  2.7     11-30  Mg     1.9     11-30                            9.9    11.02 )-----------( 249      ( 01 Dec 2023 07:12 )             32.5     PT/INR - ( 30 Nov 2023 04:39 )   PT: 12.2 sec;   INR: 1.11 ratio         PTT - ( 30 Nov 2023 04:39 )  PTT:27.0 sec  Urinalysis Basic - ( 30 Nov 2023 05:49 )    Color: x / Appearance: x / SG: x / pH: x  Gluc: 197 mg/dL / Ketone: x  / Bili: x / Urobili: x   Blood: x / Protein: x / Nitrite: x   Leuk Esterase: x / RBC: x / WBC x   Sq Epi: x / Non Sq Epi: x / Bacteria: x

## 2023-12-01 NOTE — BH CONSULTATION LIAISON ASSESSMENT NOTE - CURRENT MEDICATION
MEDICATIONS  (STANDING):  amLODIPine   Tablet 5 milliGRAM(s) Oral daily  chlorhexidine 2% Cloths 1 Application(s) Topical daily  dextrose 5%. 1000 milliLiter(s) (50 mL/Hr) IV Continuous <Continuous>  dextrose 5%. 1000 milliLiter(s) (100 mL/Hr) IV Continuous <Continuous>  dextrose 50% Injectable 12.5 Gram(s) IV Push once  dextrose 50% Injectable 25 Gram(s) IV Push once  dextrose 50% Injectable 25 Gram(s) IV Push once  gabapentin 300 milliGRAM(s) Oral three times a day  glucagon  Injectable 1 milliGRAM(s) IntraMuscular once  influenza   Vaccine 0.5 milliLiter(s) IntraMuscular once  insulin lispro (ADMELOG) corrective regimen sliding scale   SubCutaneous at bedtime  insulin lispro (ADMELOG) corrective regimen sliding scale   SubCutaneous three times a day before meals  vancomycin  IVPB 1250 milliGRAM(s) IV Intermittent every 12 hours  vancomycin  IVPB        MEDICATIONS  (PRN):  acetaminophen     Tablet .. 650 milliGRAM(s) Oral every 6 hours PRN Temp greater or equal to 38C (100.4F)  dextrose Oral Gel 15 Gram(s) Oral once PRN Blood Glucose LESS THAN 70 milliGRAM(s)/deciliter  HYDROmorphone  Injectable 0.5 milliGRAM(s) IV Push every 3 hours PRN Moderate Pain (4 - 6)  ondansetron Injectable 4 milliGRAM(s) IV Push once PRN Nausea and/or Vomiting  oxyCODONE    IR 5 milliGRAM(s) Oral every 6 hours PRN Severe Pain (7 - 10)  
MEDICATIONS  (STANDING):  amLODIPine   Tablet 5 milliGRAM(s) Oral daily  chlorhexidine 2% Cloths 1 Application(s) Topical daily  chlorhexidine 2% Cloths 1 Application(s) Topical two times a day  dextrose 5%. 1000 milliLiter(s) (100 mL/Hr) IV Continuous <Continuous>  dextrose 5%. 1000 milliLiter(s) (50 mL/Hr) IV Continuous <Continuous>  dextrose 50% Injectable 12.5 Gram(s) IV Push once  dextrose 50% Injectable 25 Gram(s) IV Push once  dextrose 50% Injectable 25 Gram(s) IV Push once  gabapentin 300 milliGRAM(s) Oral three times a day  glucagon  Injectable 1 milliGRAM(s) IntraMuscular once  influenza   Vaccine 0.5 milliLiter(s) IntraMuscular once  insulin lispro (ADMELOG) corrective regimen sliding scale   SubCutaneous three times a day before meals  insulin lispro (ADMELOG) corrective regimen sliding scale   SubCutaneous at bedtime  lisinopril 20 milliGRAM(s) Oral daily  polyethylene glycol 3350 17 Gram(s) Oral daily  senna 2 Tablet(s) Oral at bedtime  vancomycin  IVPB 1000 milliGRAM(s) IV Intermittent every 12 hours    MEDICATIONS  (PRN):  acetaminophen     Tablet .. 650 milliGRAM(s) Oral every 6 hours PRN Temp greater or equal to 38C (100.4F)  dextrose Oral Gel 15 Gram(s) Oral once PRN Blood Glucose LESS THAN 70 milliGRAM(s)/deciliter  ondansetron Injectable 4 milliGRAM(s) IV Push once PRN Nausea and/or Vomiting  oxyCODONE    IR 5 milliGRAM(s) Oral every 6 hours PRN Severe Pain (7 - 10)

## 2023-12-01 NOTE — BH CONSULTATION LIAISON ASSESSMENT NOTE - SUMMARY
62 yo M s/p BKA 2 weeks ago, revision 2 days ago, with depressed mood since death of wife 2 years ago, worse since surgeries, low energy, concerns about medical prognosis, wanting to know when he will be able to get out of bed, when he will be discharged.    This patient's depressed mood and low energy are most consistent with adjustment disorder with depressed mood. Given no sleep disturbance, no lack of interest in usual activities, no guilt, no concentration change, no appetite change, no suicidal ideation does not meet criteria for depressive episode. 64 yo M s/p BKA 2 weeks ago, revision 2 days ago, with depressed mood since death of wife 2 years ago, worse since surgeries, low energy, concerns about medical prognosis, wanting to know when he will be able to get out of bed, when he will be discharged.    This patient's depressed mood and low energy are most consistent with adjustment disorder with depressed mood. Given no sleep disturbance, no lack of interest in usual activities, no guilt, no concentration change, no appetite change, no suicidal ideation does not meet criteria for depressive episode.    Plan:  - Duloextine 30 mg QD for mood and pain   - Consider PT to work on ambulation/OOB when possible  - Refer to outpatient psychotherapy

## 2023-12-01 NOTE — PHYSICAL THERAPY INITIAL EVALUATION ADULT - PERTINENT HX OF CURRENT PROBLEM, REHAB EVAL
64 y/o male with pmhx htn, dm, hld  presents to the ED sent in from rehab center for right foot discoloration and pain. Patient states that he was discharged about 1 week ago from outside hospital after being treated for infection to right foot. He has been in rehab center for about 1 week. States today nurses looked at his foot and sent him to [facility] ED for evaluation. Patient has not been on any abx for foot while in rehab. 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 Patient states that he was discharged about 1 week ago from outside hospital after being treated for infection to right foot. He has been in rehab center for about 1 week. States today nurses looked at his foot and sent him to [facility] ED for evaluation. Patient has not been on any abx for foot while in rehab. 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 Patient states that he was discharged about 1 week ago from outside hospital after being treated for infection to right foot. He has been in rehab center for about 1 week. States today nurses looked at his foot and sent him to [facility] ED for evaluation. Patient has not been on any abx for foot while in rehab. 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

## 2023-12-01 NOTE — BH CONSULTATION LIAISON ASSESSMENT NOTE - NSBHCHARTREVIEWVS_PSY_A_CORE FT
Vital Signs Last 24 Hrs  T(C): 36.7 (24 Nov 2023 10:15), Max: 37.2 (23 Nov 2023 17:05)  T(F): 98 (24 Nov 2023 10:15), Max: 98.9 (23 Nov 2023 17:05)  HR: 87 (24 Nov 2023 10:15) (75 - 94)  BP: 161/92 (24 Nov 2023 10:15) (152/83 - 163/81)  BP(mean): --  RR: 18 (24 Nov 2023 10:15) (18 - 18)  SpO2: 96% (24 Nov 2023 10:15) (94% - 97%)    Parameters below as of 24 Nov 2023 10:15  Patient On (Oxygen Delivery Method): room air    
Vital Signs Last 24 Hrs  T(C): 37.1 (01 Dec 2023 09:45), Max: 37.1 (01 Dec 2023 09:45)  T(F): 98.7 (01 Dec 2023 09:45), Max: 98.7 (01 Dec 2023 09:45)  HR: 83 (01 Dec 2023 09:45) (67 - 84)  BP: 106/64 (01 Dec 2023 09:45) (106/64 - 156/71)  BP(mean): 105 (30 Nov 2023 18:00) (97 - 108)  RR: 18 (01 Dec 2023 09:45) (16 - 18)  SpO2: 92% (01 Dec 2023 09:45) (92% - 98%)    Parameters below as of 01 Dec 2023 09:45  Patient On (Oxygen Delivery Method): room air

## 2023-12-01 NOTE — CONSULT NOTE ADULT - CONSULT REQUESTED DATE/TIME
01-Dec-2023 12:59
15-Nov-2023 12:43
18-Nov-2023 08:57
16-Nov-2023
16-Nov-2023 16:31
15-Nov-2023 18:27
25-Nov-2023 18:42

## 2023-12-02 LAB
ANION GAP SERPL CALC-SCNC: 9 MMOL/L — SIGNIFICANT CHANGE UP (ref 5–17)
ANION GAP SERPL CALC-SCNC: 9 MMOL/L — SIGNIFICANT CHANGE UP (ref 5–17)
BUN SERPL-MCNC: 19 MG/DL — SIGNIFICANT CHANGE UP (ref 7–23)
BUN SERPL-MCNC: 19 MG/DL — SIGNIFICANT CHANGE UP (ref 7–23)
CALCIUM SERPL-MCNC: 8.6 MG/DL — SIGNIFICANT CHANGE UP (ref 8.4–10.5)
CALCIUM SERPL-MCNC: 8.6 MG/DL — SIGNIFICANT CHANGE UP (ref 8.4–10.5)
CHLORIDE SERPL-SCNC: 102 MMOL/L — SIGNIFICANT CHANGE UP (ref 96–108)
CHLORIDE SERPL-SCNC: 102 MMOL/L — SIGNIFICANT CHANGE UP (ref 96–108)
CO2 SERPL-SCNC: 25 MMOL/L — SIGNIFICANT CHANGE UP (ref 22–31)
CO2 SERPL-SCNC: 25 MMOL/L — SIGNIFICANT CHANGE UP (ref 22–31)
CREAT SERPL-MCNC: 0.51 MG/DL — SIGNIFICANT CHANGE UP (ref 0.5–1.3)
CREAT SERPL-MCNC: 0.51 MG/DL — SIGNIFICANT CHANGE UP (ref 0.5–1.3)
EGFR: 113 ML/MIN/1.73M2 — SIGNIFICANT CHANGE UP
EGFR: 113 ML/MIN/1.73M2 — SIGNIFICANT CHANGE UP
GLUCOSE BLDC GLUCOMTR-MCNC: 169 MG/DL — HIGH (ref 70–99)
GLUCOSE BLDC GLUCOMTR-MCNC: 169 MG/DL — HIGH (ref 70–99)
GLUCOSE BLDC GLUCOMTR-MCNC: 199 MG/DL — HIGH (ref 70–99)
GLUCOSE BLDC GLUCOMTR-MCNC: 199 MG/DL — HIGH (ref 70–99)
GLUCOSE BLDC GLUCOMTR-MCNC: 203 MG/DL — HIGH (ref 70–99)
GLUCOSE BLDC GLUCOMTR-MCNC: 203 MG/DL — HIGH (ref 70–99)
GLUCOSE BLDC GLUCOMTR-MCNC: 262 MG/DL — HIGH (ref 70–99)
GLUCOSE BLDC GLUCOMTR-MCNC: 262 MG/DL — HIGH (ref 70–99)
GLUCOSE SERPL-MCNC: 157 MG/DL — HIGH (ref 70–99)
GLUCOSE SERPL-MCNC: 157 MG/DL — HIGH (ref 70–99)
HCT VFR BLD CALC: 28.1 % — LOW (ref 39–50)
HCT VFR BLD CALC: 28.1 % — LOW (ref 39–50)
HGB BLD-MCNC: 8.5 G/DL — LOW (ref 13–17)
HGB BLD-MCNC: 8.5 G/DL — LOW (ref 13–17)
MCHC RBC-ENTMCNC: 24.9 PG — LOW (ref 27–34)
MCHC RBC-ENTMCNC: 24.9 PG — LOW (ref 27–34)
MCHC RBC-ENTMCNC: 30.2 GM/DL — LOW (ref 32–36)
MCHC RBC-ENTMCNC: 30.2 GM/DL — LOW (ref 32–36)
MCV RBC AUTO: 82.2 FL — SIGNIFICANT CHANGE UP (ref 80–100)
MCV RBC AUTO: 82.2 FL — SIGNIFICANT CHANGE UP (ref 80–100)
NRBC # BLD: 0 /100 WBCS — SIGNIFICANT CHANGE UP (ref 0–0)
NRBC # BLD: 0 /100 WBCS — SIGNIFICANT CHANGE UP (ref 0–0)
PLATELET # BLD AUTO: 266 K/UL — SIGNIFICANT CHANGE UP (ref 150–400)
PLATELET # BLD AUTO: 266 K/UL — SIGNIFICANT CHANGE UP (ref 150–400)
POTASSIUM SERPL-MCNC: 4.1 MMOL/L — SIGNIFICANT CHANGE UP (ref 3.5–5.3)
POTASSIUM SERPL-MCNC: 4.1 MMOL/L — SIGNIFICANT CHANGE UP (ref 3.5–5.3)
POTASSIUM SERPL-SCNC: 4.1 MMOL/L — SIGNIFICANT CHANGE UP (ref 3.5–5.3)
POTASSIUM SERPL-SCNC: 4.1 MMOL/L — SIGNIFICANT CHANGE UP (ref 3.5–5.3)
RBC # BLD: 3.42 M/UL — LOW (ref 4.2–5.8)
RBC # BLD: 3.42 M/UL — LOW (ref 4.2–5.8)
RBC # FLD: 17.9 % — HIGH (ref 10.3–14.5)
RBC # FLD: 17.9 % — HIGH (ref 10.3–14.5)
SODIUM SERPL-SCNC: 136 MMOL/L — SIGNIFICANT CHANGE UP (ref 135–145)
SODIUM SERPL-SCNC: 136 MMOL/L — SIGNIFICANT CHANGE UP (ref 135–145)
WBC # BLD: 9.45 K/UL — SIGNIFICANT CHANGE UP (ref 3.8–10.5)
WBC # BLD: 9.45 K/UL — SIGNIFICANT CHANGE UP (ref 3.8–10.5)
WBC # FLD AUTO: 9.45 K/UL — SIGNIFICANT CHANGE UP (ref 3.8–10.5)
WBC # FLD AUTO: 9.45 K/UL — SIGNIFICANT CHANGE UP (ref 3.8–10.5)

## 2023-12-02 RX ADMIN — GABAPENTIN 300 MILLIGRAM(S): 400 CAPSULE ORAL at 06:57

## 2023-12-02 RX ADMIN — SENNA PLUS 2 TABLET(S): 8.6 TABLET ORAL at 21:56

## 2023-12-02 RX ADMIN — ENOXAPARIN SODIUM 40 MILLIGRAM(S): 100 INJECTION SUBCUTANEOUS at 17:31

## 2023-12-02 RX ADMIN — Medication 650 MILLIGRAM(S): at 13:40

## 2023-12-02 RX ADMIN — CHLORHEXIDINE GLUCONATE 1 APPLICATION(S): 213 SOLUTION TOPICAL at 06:58

## 2023-12-02 RX ADMIN — OXYCODONE HYDROCHLORIDE 5 MILLIGRAM(S): 5 TABLET ORAL at 11:23

## 2023-12-02 RX ADMIN — GABAPENTIN 300 MILLIGRAM(S): 400 CAPSULE ORAL at 21:56

## 2023-12-02 RX ADMIN — AMLODIPINE BESYLATE 5 MILLIGRAM(S): 2.5 TABLET ORAL at 06:57

## 2023-12-02 RX ADMIN — LISINOPRIL 20 MILLIGRAM(S): 2.5 TABLET ORAL at 06:57

## 2023-12-02 RX ADMIN — OXYCODONE HYDROCHLORIDE 5 MILLIGRAM(S): 5 TABLET ORAL at 12:20

## 2023-12-02 RX ADMIN — POLYETHYLENE GLYCOL 3350 17 GRAM(S): 17 POWDER, FOR SOLUTION ORAL at 12:37

## 2023-12-02 RX ADMIN — Medication 3: at 12:33

## 2023-12-02 RX ADMIN — Medication 650 MILLIGRAM(S): at 12:49

## 2023-12-02 RX ADMIN — Medication 2: at 09:21

## 2023-12-02 RX ADMIN — OXYCODONE HYDROCHLORIDE 5 MILLIGRAM(S): 5 TABLET ORAL at 17:36

## 2023-12-02 RX ADMIN — OXYCODONE HYDROCHLORIDE 5 MILLIGRAM(S): 5 TABLET ORAL at 18:30

## 2023-12-02 RX ADMIN — GABAPENTIN 300 MILLIGRAM(S): 400 CAPSULE ORAL at 14:44

## 2023-12-02 RX ADMIN — Medication 1: at 17:32

## 2023-12-02 NOTE — PROGRESS NOTE ADULT - SUBJECTIVE AND OBJECTIVE BOX
Subjective: Patient seen and examined. No new events except as noted.     SUBJECTIVE/ROS:  MEDICATIONS:  MEDICATIONS  (STANDING):  amLODIPine   Tablet 5 milliGRAM(s) Oral daily  chlorhexidine 2% Cloths 1 Application(s) Topical two times a day  chlorhexidine 2% Cloths 1 Application(s) Topical daily  dextrose 5%. 1000 milliLiter(s) (50 mL/Hr) IV Continuous <Continuous>  dextrose 5%. 1000 milliLiter(s) (100 mL/Hr) IV Continuous <Continuous>  dextrose 50% Injectable 12.5 Gram(s) IV Push once  dextrose 50% Injectable 25 Gram(s) IV Push once  dextrose 50% Injectable 25 Gram(s) IV Push once  enoxaparin Injectable 40 milliGRAM(s) SubCutaneous every 24 hours  gabapentin 300 milliGRAM(s) Oral three times a day  glucagon  Injectable 1 milliGRAM(s) IntraMuscular once  influenza   Vaccine 0.5 milliLiter(s) IntraMuscular once  insulin lispro (ADMELOG) corrective regimen sliding scale   SubCutaneous three times a day before meals  insulin lispro (ADMELOG) corrective regimen sliding scale   SubCutaneous at bedtime  lisinopril 20 milliGRAM(s) Oral daily  polyethylene glycol 3350 17 Gram(s) Oral daily  senna 2 Tablet(s) Oral at bedtime      PHYSICAL EXAM:  T(C): 37 (12-02-23 @ 05:22), Max: 37.2 (12-02-23 @ 01:05)  HR: 75 (12-02-23 @ 05:22) (75 - 83)  BP: 145/79 (12-02-23 @ 05:22) (106/64 - 145/79)  RR: 18 (12-02-23 @ 05:22) (18 - 18)  SpO2: 96% (12-02-23 @ 05:22) (92% - 97%)  Wt(kg): --  I&O's Summary    01 Dec 2023 07:01  -  02 Dec 2023 07:00  --------------------------------------------------------  IN: 850 mL / OUT: 725 mL / NET: 125 mL            JVP: Normal  Neck: supple  Lung: clear   CV: S1 S2 , Murmur:  Abd: soft  Ext: No edema  neuro: Awake / alert  Psych: flat affect      LABS/DATA:    CARDIAC MARKERS:                                8.5    9.45  )-----------( 266      ( 02 Dec 2023 07:11 )             28.1     12-02    136  |  102  |  19  ----------------------------<  157<H>  4.1   |  25  |  0.51    Ca    8.6      02 Dec 2023 07:10      proBNP:   Lipid Profile:   HgA1c:   TSH:     TELE:  EKG:

## 2023-12-02 NOTE — PROGRESS NOTE ADULT - SUBJECTIVE AND OBJECTIVE BOX
Vascular Surgery Progress Note  Patient is a 63y old  Male who presents with a chief complaint of Rt foot pain (02 Dec 2023 08:55)      INTERVAL EVENTS: No acute events overnight.  SUBJECTIVE: Patient seen and examined at bedside with surgical team, patient without complaints. Denies fever, chills, CP, SOB nausea, vomiting, abdominal pain.    OBJECTIVE:    Vital Signs Last 24 Hrs  T(C): 37.3 (02 Dec 2023 13:02), Max: 37.3 (02 Dec 2023 13:02)  T(F): 99.2 (02 Dec 2023 13:02), Max: 99.2 (02 Dec 2023 13:02)  HR: 75 (02 Dec 2023 13:02) (56 - 80)  BP: 145/77 (02 Dec 2023 13:02) (110/69 - 152/82)  BP(mean): --  RR: 18 (02 Dec 2023 13:02) (18 - 18)  SpO2: 94% (02 Dec 2023 13:02) (94% - 96%)    Parameters below as of 02 Dec 2023 13:02  Patient On (Oxygen Delivery Method): room air    I&O's Detail    01 Dec 2023 07:01  -  02 Dec 2023 07:00  --------------------------------------------------------  IN:    Oral Fluid: 850 mL  Total IN: 850 mL    OUT:    Indwelling Catheter - Urethral (mL): 725 mL  Total OUT: 725 mL    Total NET: 125 mL      02 Dec 2023 07:01  -  02 Dec 2023 15:05  --------------------------------------------------------  IN:    Oral Fluid: 480 mL  Total IN: 480 mL    OUT:    Intermittent Catheterization - Urethral (mL): 300 mL  Total OUT: 300 mL    Total NET: 180 mL      MEDICATIONS  (STANDING):  amLODIPine   Tablet 5 milliGRAM(s) Oral daily  chlorhexidine 2% Cloths 1 Application(s) Topical daily  chlorhexidine 2% Cloths 1 Application(s) Topical two times a day  dextrose 5%. 1000 milliLiter(s) (100 mL/Hr) IV Continuous <Continuous>  dextrose 5%. 1000 milliLiter(s) (50 mL/Hr) IV Continuous <Continuous>  dextrose 50% Injectable 25 Gram(s) IV Push once  dextrose 50% Injectable 12.5 Gram(s) IV Push once  dextrose 50% Injectable 25 Gram(s) IV Push once  enoxaparin Injectable 40 milliGRAM(s) SubCutaneous every 24 hours  gabapentin 300 milliGRAM(s) Oral three times a day  glucagon  Injectable 1 milliGRAM(s) IntraMuscular once  influenza   Vaccine 0.5 milliLiter(s) IntraMuscular once  insulin lispro (ADMELOG) corrective regimen sliding scale   SubCutaneous three times a day before meals  insulin lispro (ADMELOG) corrective regimen sliding scale   SubCutaneous at bedtime  lisinopril 20 milliGRAM(s) Oral daily  polyethylene glycol 3350 17 Gram(s) Oral daily  senna 2 Tablet(s) Oral at bedtime    MEDICATIONS  (PRN):  acetaminophen     Tablet .. 650 milliGRAM(s) Oral every 6 hours PRN Temp greater or equal to 38C (100.4F)  dextrose Oral Gel 15 Gram(s) Oral once PRN Blood Glucose LESS THAN 70 milliGRAM(s)/deciliter  ondansetron Injectable 4 milliGRAM(s) IV Push once PRN Nausea and/or Vomiting  oxyCODONE    IR 5 milliGRAM(s) Oral every 6 hours PRN Severe Pain (7 - 10)      PHYSICAL EXAM:  Constitutional: A&Ox3, NAD  Respiratory: Unlabored breathing  Abdomen: Soft, nondistended, NTTP. No rebound or guarding.  Extremities: WWP, MCNEILL spontaneously, R AKA soft, no palpable collections vac to suction with scant s/s output    LABS:                        8.5    9.45  )-----------( 266      ( 02 Dec 2023 07:11 )             28.1     12-02    136  |  102  |  19  ----------------------------<  157<H>  4.1   |  25  |  0.51    Ca    8.6      02 Dec 2023 07:10          Urinalysis Basic - ( 02 Dec 2023 07:10 )    Color: x / Appearance: x / SG: x / pH: x  Gluc: 157 mg/dL / Ketone: x  / Bili: x / Urobili: x   Blood: x / Protein: x / Nitrite: x   Leuk Esterase: x / RBC: x / WBC x   Sq Epi: x / Non Sq Epi: x / Bacteria: x          IMAGING:

## 2023-12-02 NOTE — PROGRESS NOTE ADULT - ASSESSMENT
63M PMHx HTN, DM, HLD, presented to ED from rehab for acute limb ischemia. Patient was discharged about 1 week ago from OSH to rehab for conservative medical treatment of foot infection and later brought to ED for increased pain and acute discoloration of foot. Does not weightbear on right leg and has not ambulated in months. Patient now s/p R kiel ESPARZA 11/19, recovering appropriately.     Recommendations:  - s/p R AKA  - vac therapy - down Monday  - pain control  - care per medical team     Vascular Surgery   [id number]

## 2023-12-03 LAB
GLUCOSE BLDC GLUCOMTR-MCNC: 152 MG/DL — HIGH (ref 70–99)
GLUCOSE BLDC GLUCOMTR-MCNC: 152 MG/DL — HIGH (ref 70–99)
GLUCOSE BLDC GLUCOMTR-MCNC: 190 MG/DL — HIGH (ref 70–99)
GLUCOSE BLDC GLUCOMTR-MCNC: 190 MG/DL — HIGH (ref 70–99)
GLUCOSE BLDC GLUCOMTR-MCNC: 214 MG/DL — HIGH (ref 70–99)
GLUCOSE BLDC GLUCOMTR-MCNC: 214 MG/DL — HIGH (ref 70–99)
GLUCOSE BLDC GLUCOMTR-MCNC: 240 MG/DL — HIGH (ref 70–99)
GLUCOSE BLDC GLUCOMTR-MCNC: 240 MG/DL — HIGH (ref 70–99)
GLUCOSE BLDC GLUCOMTR-MCNC: 244 MG/DL — HIGH (ref 70–99)
GLUCOSE BLDC GLUCOMTR-MCNC: 244 MG/DL — HIGH (ref 70–99)

## 2023-12-03 RX ORDER — OXYCODONE HYDROCHLORIDE 5 MG/1
5 TABLET ORAL ONCE
Refills: 0 | Status: DISCONTINUED | OUTPATIENT
Start: 2023-12-03 | End: 2023-12-03

## 2023-12-03 RX ORDER — DULOXETINE HYDROCHLORIDE 30 MG/1
30 CAPSULE, DELAYED RELEASE ORAL DAILY
Refills: 0 | Status: DISCONTINUED | OUTPATIENT
Start: 2023-12-03 | End: 2023-12-08

## 2023-12-03 RX ADMIN — LISINOPRIL 20 MILLIGRAM(S): 2.5 TABLET ORAL at 06:12

## 2023-12-03 RX ADMIN — Medication 2: at 13:04

## 2023-12-03 RX ADMIN — CHLORHEXIDINE GLUCONATE 1 APPLICATION(S): 213 SOLUTION TOPICAL at 08:13

## 2023-12-03 RX ADMIN — OXYCODONE HYDROCHLORIDE 5 MILLIGRAM(S): 5 TABLET ORAL at 13:30

## 2023-12-03 RX ADMIN — Medication 2: at 09:56

## 2023-12-03 RX ADMIN — GABAPENTIN 300 MILLIGRAM(S): 400 CAPSULE ORAL at 06:11

## 2023-12-03 RX ADMIN — Medication 1: at 17:55

## 2023-12-03 RX ADMIN — OXYCODONE HYDROCHLORIDE 5 MILLIGRAM(S): 5 TABLET ORAL at 10:01

## 2023-12-03 RX ADMIN — GABAPENTIN 300 MILLIGRAM(S): 400 CAPSULE ORAL at 13:53

## 2023-12-03 RX ADMIN — OXYCODONE HYDROCHLORIDE 5 MILLIGRAM(S): 5 TABLET ORAL at 22:04

## 2023-12-03 RX ADMIN — GABAPENTIN 300 MILLIGRAM(S): 400 CAPSULE ORAL at 21:04

## 2023-12-03 RX ADMIN — OXYCODONE HYDROCHLORIDE 5 MILLIGRAM(S): 5 TABLET ORAL at 13:00

## 2023-12-03 RX ADMIN — POLYETHYLENE GLYCOL 3350 17 GRAM(S): 17 POWDER, FOR SOLUTION ORAL at 13:01

## 2023-12-03 RX ADMIN — AMLODIPINE BESYLATE 5 MILLIGRAM(S): 2.5 TABLET ORAL at 06:11

## 2023-12-03 RX ADMIN — OXYCODONE HYDROCHLORIDE 5 MILLIGRAM(S): 5 TABLET ORAL at 21:04

## 2023-12-03 RX ADMIN — DULOXETINE HYDROCHLORIDE 30 MILLIGRAM(S): 30 CAPSULE, DELAYED RELEASE ORAL at 13:00

## 2023-12-03 RX ADMIN — OXYCODONE HYDROCHLORIDE 5 MILLIGRAM(S): 5 TABLET ORAL at 10:31

## 2023-12-03 RX ADMIN — ENOXAPARIN SODIUM 40 MILLIGRAM(S): 100 INJECTION SUBCUTANEOUS at 17:54

## 2023-12-03 RX ADMIN — SENNA PLUS 2 TABLET(S): 8.6 TABLET ORAL at 21:04

## 2023-12-03 NOTE — PROGRESS NOTE ADULT - SUBJECTIVE AND OBJECTIVE BOX
Subjective: Patient seen and examined. No new events except as noted.     SUBJECTIVE/ROS:    nad    MEDICATIONS:  MEDICATIONS  (STANDING):  amLODIPine   Tablet 5 milliGRAM(s) Oral daily  chlorhexidine 2% Cloths 1 Application(s) Topical daily  chlorhexidine 2% Cloths 1 Application(s) Topical two times a day  dextrose 5%. 1000 milliLiter(s) (50 mL/Hr) IV Continuous <Continuous>  dextrose 5%. 1000 milliLiter(s) (100 mL/Hr) IV Continuous <Continuous>  dextrose 50% Injectable 25 Gram(s) IV Push once  dextrose 50% Injectable 12.5 Gram(s) IV Push once  dextrose 50% Injectable 25 Gram(s) IV Push once  enoxaparin Injectable 40 milliGRAM(s) SubCutaneous every 24 hours  gabapentin 300 milliGRAM(s) Oral three times a day  glucagon  Injectable 1 milliGRAM(s) IntraMuscular once  influenza   Vaccine 0.5 milliLiter(s) IntraMuscular once  insulin lispro (ADMELOG) corrective regimen sliding scale   SubCutaneous three times a day before meals  insulin lispro (ADMELOG) corrective regimen sliding scale   SubCutaneous at bedtime  lisinopril 20 milliGRAM(s) Oral daily  polyethylene glycol 3350 17 Gram(s) Oral daily  senna 2 Tablet(s) Oral at bedtime      PHYSICAL EXAM:  T(C): 37.1 (12-03-23 @ 10:01), Max: 37.3 (12-02-23 @ 13:02)  HR: 88 (12-03-23 @ 10:01) (69 - 88)  BP: 129/69 (12-03-23 @ 10:01) (129/69 - 168/85)  RR: 18 (12-03-23 @ 10:01) (18 - 18)  SpO2: 94% (12-03-23 @ 10:01) (94% - 96%)  Wt(kg): --  I&O's Summary    02 Dec 2023 07:01  -  03 Dec 2023 07:00  --------------------------------------------------------  IN: 480 mL / OUT: 1350 mL / NET: -870 mL    03 Dec 2023 07:01  -  03 Dec 2023 11:52  --------------------------------------------------------  IN: 240 mL / OUT: 750 mL / NET: -510 mL            JVP: Normal  Neck: supple  Lung: clear   CV: S1 S2 , Murmur:  Abd: soft  Ext: No edema  neuro: Awake / alert  Psych: flat affect  Skin: normal``    LABS/DATA:    CARDIAC MARKERS:                                8.5    9.45  )-----------( 266      ( 02 Dec 2023 07:11 )             28.1     12-02    136  |  102  |  19  ----------------------------<  157<H>  4.1   |  25  |  0.51    Ca    8.6      02 Dec 2023 07:10      proBNP:   Lipid Profile:   HgA1c:   TSH:     TELE:  EKG:

## 2023-12-03 NOTE — PROGRESS NOTE ADULT - ASSESSMENT
63M PMHx HTN, DM, HLD, presented to ED from rehab for acute limb ischemia. Patient was discharged about 1 week ago from OSH to rehab for conservative medical treatment of foot infection and later brought to ED for increased pain and acute discoloration of foot. Does not weightbear on right leg and has not ambulated in months. Patient now s/p R kiel ESPARZA 11/19, recovering appropriately.     Recommendations:  - s/p R AKA  - vac therapy - down Monday  - pain control  - care per medical team     Vascular Surgery   [id number] 63M PMHx HTN, DM, HLD, presented to ED from rehab for acute limb ischemia. Patient was discharged about 1 week ago from OSH to rehab for conservative medical treatment of foot infection and later brought to ED for increased pain and acute discoloration of foot. Does not weightbear on right leg and has not ambulated in months. Patient now s/p R kiel ESPARZA 11/19, recovering appropriately.     Recommendations:  - s/p R AKA  - vac therapy - down Monday  - pain control  - care per medical team     Vascular Surgery   [id number]

## 2023-12-03 NOTE — PROGRESS NOTE ADULT - SUBJECTIVE AND OBJECTIVE BOX
Patient is a 63y old  Male who presents with a chief complaint of Rt foot pain (03 Dec 2023 15:33)      SUBJECTIVE / OVERNIGHT EVENTS:    Events noted.  CONSTITUTIONAL: No fever,  or fatigue  RESPIRATORY: No cough, wheezing,  No shortness of breath  CARDIOVASCULAR: No chest pain, palpitations, dizziness, or leg swelling  GASTROINTESTINAL: No abdominal or epigastric pain.       MEDICATIONS  (STANDING):  amLODIPine   Tablet 5 milliGRAM(s) Oral daily  chlorhexidine 2% Cloths 1 Application(s) Topical two times a day  chlorhexidine 2% Cloths 1 Application(s) Topical daily  dextrose 5%. 1000 milliLiter(s) (100 mL/Hr) IV Continuous <Continuous>  dextrose 5%. 1000 milliLiter(s) (50 mL/Hr) IV Continuous <Continuous>  dextrose 50% Injectable 12.5 Gram(s) IV Push once  dextrose 50% Injectable 25 Gram(s) IV Push once  dextrose 50% Injectable 25 Gram(s) IV Push once  DULoxetine 30 milliGRAM(s) Oral daily  enoxaparin Injectable 40 milliGRAM(s) SubCutaneous every 24 hours  gabapentin 300 milliGRAM(s) Oral three times a day  glucagon  Injectable 1 milliGRAM(s) IntraMuscular once  influenza   Vaccine 0.5 milliLiter(s) IntraMuscular once  insulin lispro (ADMELOG) corrective regimen sliding scale   SubCutaneous three times a day before meals  insulin lispro (ADMELOG) corrective regimen sliding scale   SubCutaneous at bedtime  lisinopril 20 milliGRAM(s) Oral daily  polyethylene glycol 3350 17 Gram(s) Oral daily  senna 2 Tablet(s) Oral at bedtime    MEDICATIONS  (PRN):  acetaminophen     Tablet .. 650 milliGRAM(s) Oral every 6 hours PRN Temp greater or equal to 38C (100.4F)  dextrose Oral Gel 15 Gram(s) Oral once PRN Blood Glucose LESS THAN 70 milliGRAM(s)/deciliter  ondansetron Injectable 4 milliGRAM(s) IV Push once PRN Nausea and/or Vomiting  oxyCODONE    IR 5 milliGRAM(s) Oral every 6 hours PRN Severe Pain (7 - 10)        CAPILLARY BLOOD GLUCOSE      POCT Blood Glucose.: 152 mg/dL (03 Dec 2023 17:53)  POCT Blood Glucose.: 214 mg/dL (03 Dec 2023 13:36)  POCT Blood Glucose.: 240 mg/dL (03 Dec 2023 13:03)  POCT Blood Glucose.: 244 mg/dL (03 Dec 2023 09:55)  POCT Blood Glucose.: 199 mg/dL (02 Dec 2023 22:00)    I&O's Summary    02 Dec 2023 07:01  -  03 Dec 2023 07:00  --------------------------------------------------------  IN: 480 mL / OUT: 1350 mL / NET: -870 mL    03 Dec 2023 07:01  -  03 Dec 2023 21:13  --------------------------------------------------------  IN: 600 mL / OUT: 1200 mL / NET: -600 mL        T(C): 36.9 (12-03-23 @ 17:01), Max: 37.2 (12-03-23 @ 06:04)  HR: 82 (12-03-23 @ 17:01) (70 - 88)  BP: 150/74 (12-03-23 @ 17:01) (129/69 - 168/85)  RR: 18 (12-03-23 @ 17:01) (18 - 18)  SpO2: 94% (12-03-23 @ 17:01) (94% - 98%)    PHYSICAL EXAM:  GENERAL: NAD  NECK: Supple, No JVD  CHEST/LUNG: Clear to auscultation bilaterally; No wheezing.  HEART: Regular rate and rhythm; No murmurs, rubs, or gallops  ABDOMEN: Soft, Nontender, Nondistended; Bowel sounds present  EXTREMITIES:  Rt AKA  NEUROLOGY: AAO X 3      LABS:                        8.5    9.45  )-----------( 266      ( 02 Dec 2023 07:11 )             28.1     12-02    136  |  102  |  19  ----------------------------<  157<H>  4.1   |  25  |  0.51    Ca    8.6      02 Dec 2023 07:10            Urinalysis Basic - ( 02 Dec 2023 07:10 )    Color: x / Appearance: x / SG: x / pH: x  Gluc: 157 mg/dL / Ketone: x  / Bili: x / Urobili: x   Blood: x / Protein: x / Nitrite: x   Leuk Esterase: x / RBC: x / WBC x   Sq Epi: x / Non Sq Epi: x / Bacteria: x      CAPILLARY BLOOD GLUCOSE      POCT Blood Glucose.: 152 mg/dL (03 Dec 2023 17:53)  POCT Blood Glucose.: 214 mg/dL (03 Dec 2023 13:36)  POCT Blood Glucose.: 240 mg/dL (03 Dec 2023 13:03)  POCT Blood Glucose.: 244 mg/dL (03 Dec 2023 09:55)  POCT Blood Glucose.: 199 mg/dL (02 Dec 2023 22:00)        RADIOLOGY & ADDITIONAL TESTS:    Imaging Personally Reviewed:    Consultant(s) Notes Reviewed:      Care Discussed with Consultants/Other Providers:    Edmond Schmitz MD, CMD, FACP    [address] [place] [address]  Office Tel: [phone]  Cell: [phone]   Patient is a 63y old  Male who presents with a chief complaint of Rt foot pain (03 Dec 2023 15:33)      SUBJECTIVE / OVERNIGHT EVENTS:    Events noted.  CONSTITUTIONAL: No fever,  or fatigue  RESPIRATORY: No cough, wheezing,  No shortness of breath  CARDIOVASCULAR: No chest pain, palpitations, dizziness, or leg swelling  GASTROINTESTINAL: No abdominal or epigastric pain.       MEDICATIONS  (STANDING):  amLODIPine   Tablet 5 milliGRAM(s) Oral daily  chlorhexidine 2% Cloths 1 Application(s) Topical two times a day  chlorhexidine 2% Cloths 1 Application(s) Topical daily  dextrose 5%. 1000 milliLiter(s) (100 mL/Hr) IV Continuous <Continuous>  dextrose 5%. 1000 milliLiter(s) (50 mL/Hr) IV Continuous <Continuous>  dextrose 50% Injectable 12.5 Gram(s) IV Push once  dextrose 50% Injectable 25 Gram(s) IV Push once  dextrose 50% Injectable 25 Gram(s) IV Push once  DULoxetine 30 milliGRAM(s) Oral daily  enoxaparin Injectable 40 milliGRAM(s) SubCutaneous every 24 hours  gabapentin 300 milliGRAM(s) Oral three times a day  glucagon  Injectable 1 milliGRAM(s) IntraMuscular once  influenza   Vaccine 0.5 milliLiter(s) IntraMuscular once  insulin lispro (ADMELOG) corrective regimen sliding scale   SubCutaneous three times a day before meals  insulin lispro (ADMELOG) corrective regimen sliding scale   SubCutaneous at bedtime  lisinopril 20 milliGRAM(s) Oral daily  polyethylene glycol 3350 17 Gram(s) Oral daily  senna 2 Tablet(s) Oral at bedtime    MEDICATIONS  (PRN):  acetaminophen     Tablet .. 650 milliGRAM(s) Oral every 6 hours PRN Temp greater or equal to 38C (100.4F)  dextrose Oral Gel 15 Gram(s) Oral once PRN Blood Glucose LESS THAN 70 milliGRAM(s)/deciliter  ondansetron Injectable 4 milliGRAM(s) IV Push once PRN Nausea and/or Vomiting  oxyCODONE    IR 5 milliGRAM(s) Oral every 6 hours PRN Severe Pain (7 - 10)        CAPILLARY BLOOD GLUCOSE      POCT Blood Glucose.: 152 mg/dL (03 Dec 2023 17:53)  POCT Blood Glucose.: 214 mg/dL (03 Dec 2023 13:36)  POCT Blood Glucose.: 240 mg/dL (03 Dec 2023 13:03)  POCT Blood Glucose.: 244 mg/dL (03 Dec 2023 09:55)  POCT Blood Glucose.: 199 mg/dL (02 Dec 2023 22:00)    I&O's Summary    02 Dec 2023 07:01  -  03 Dec 2023 07:00  --------------------------------------------------------  IN: 480 mL / OUT: 1350 mL / NET: -870 mL    03 Dec 2023 07:01  -  03 Dec 2023 21:13  --------------------------------------------------------  IN: 600 mL / OUT: 1200 mL / NET: -600 mL        T(C): 36.9 (12-03-23 @ 17:01), Max: 37.2 (12-03-23 @ 06:04)  HR: 82 (12-03-23 @ 17:01) (70 - 88)  BP: 150/74 (12-03-23 @ 17:01) (129/69 - 168/85)  RR: 18 (12-03-23 @ 17:01) (18 - 18)  SpO2: 94% (12-03-23 @ 17:01) (94% - 98%)    PHYSICAL EXAM:  GENERAL: NAD  NECK: Supple, No JVD  CHEST/LUNG: Clear to auscultation bilaterally; No wheezing.  HEART: Regular rate and rhythm; No murmurs, rubs, or gallops  ABDOMEN: Soft, Nontender, Nondistended; Bowel sounds present  EXTREMITIES:  Rt AKA  NEUROLOGY: AAO X 3      LABS:                        8.5    9.45  )-----------( 266      ( 02 Dec 2023 07:11 )             28.1     12-02    136  |  102  |  19  ----------------------------<  157<H>  4.1   |  25  |  0.51    Ca    8.6      02 Dec 2023 07:10            Urinalysis Basic - ( 02 Dec 2023 07:10 )    Color: x / Appearance: x / SG: x / pH: x  Gluc: 157 mg/dL / Ketone: x  / Bili: x / Urobili: x   Blood: x / Protein: x / Nitrite: x   Leuk Esterase: x / RBC: x / WBC x   Sq Epi: x / Non Sq Epi: x / Bacteria: x      CAPILLARY BLOOD GLUCOSE      POCT Blood Glucose.: 152 mg/dL (03 Dec 2023 17:53)  POCT Blood Glucose.: 214 mg/dL (03 Dec 2023 13:36)  POCT Blood Glucose.: 240 mg/dL (03 Dec 2023 13:03)  POCT Blood Glucose.: 244 mg/dL (03 Dec 2023 09:55)  POCT Blood Glucose.: 199 mg/dL (02 Dec 2023 22:00)        RADIOLOGY & ADDITIONAL TESTS:    Imaging Personally Reviewed:    Consultant(s) Notes Reviewed:      Care Discussed with Consultants/Other Providers:    Edmond Schmitz MD, CMD, FACP    [address] [place] [address]  Office Tel: [phone]  Cell: [phone]

## 2023-12-03 NOTE — PROGRESS NOTE ADULT - SUBJECTIVE AND OBJECTIVE BOX
NEPHROLOGY     Patient seen and examined resting comfortably, no new complaints, in no acute distress.     MEDICATIONS  (STANDING):  amLODIPine   Tablet 5 milliGRAM(s) Oral daily  chlorhexidine 2% Cloths 1 Application(s) Topical two times a day  chlorhexidine 2% Cloths 1 Application(s) Topical daily  dextrose 5%. 1000 milliLiter(s) (100 mL/Hr) IV Continuous <Continuous>  dextrose 5%. 1000 milliLiter(s) (50 mL/Hr) IV Continuous <Continuous>  dextrose 50% Injectable 25 Gram(s) IV Push once  dextrose 50% Injectable 12.5 Gram(s) IV Push once  dextrose 50% Injectable 25 Gram(s) IV Push once  enoxaparin Injectable 40 milliGRAM(s) SubCutaneous every 24 hours  gabapentin 300 milliGRAM(s) Oral three times a day  glucagon  Injectable 1 milliGRAM(s) IntraMuscular once  influenza   Vaccine 0.5 milliLiter(s) IntraMuscular once  insulin lispro (ADMELOG) corrective regimen sliding scale   SubCutaneous three times a day before meals  insulin lispro (ADMELOG) corrective regimen sliding scale   SubCutaneous at bedtime  lisinopril 20 milliGRAM(s) Oral daily  polyethylene glycol 3350 17 Gram(s) Oral daily  senna 2 Tablet(s) Oral at bedtime    VITALS:  T(C): , Max: 37.3 (12-02-23 @ 13:02)  T(F): , Max: 99.2 (12-02-23 @ 13:02)  HR: 70 (12-03-23 @ 06:04)  BP: 168/85 (12-03-23 @ 06:04)  RR: 18 (12-03-23 @ 06:04)  SpO2: 95% (12-03-23 @ 06:04)    I and O's:    12-02 @ 07:01  -  12-03 @ 07:00  --------------------------------------------------------  IN: 480 mL / OUT: 1150 mL / NET: -670 mL    PHYSICAL EXAM:    Constitutional: NAD; cachetic   Neck:  No JVD  Respiratory: CTAB/L  Cardiovascular: S1 and S2  Gastrointestinal: BS+, soft, NT/ND  Extremities: No peripheral edema, R AKA   Neurological: A/O x 3, no focal deficits  Psychiatric: Normal mood, normal affect  : +  Pacheco  Skin: No rashes    LABS:                        8.5    9.45  )-----------( 266      ( 02 Dec 2023 07:11 )             28.1     12-02    136  |  102  |  19  ----------------------------<  157<H>  4.1   |  25  |  0.51    Ca    8.6      02 Dec 2023 07:10    ASSESSMENT/PLAN:   64 y/o male with pmhx htn, dm, hld  presents to the ED sent in from rehab center for right foot discoloration and pain  Hyponatremia - improving   Metabolic acidosis - improving  Wedge-shaped likely related  infection/pyelonephritis.  Hypertension     1 Renal - Resolved OLIVER;  Pacheco to gravity   Serum sodium is improving as of late, no new new labs this am   2 Vasc - SP guillotine BKA and dressing changes as well; s/p R above knee amputation with formalization (11/30)  3 ID - s/p Vanco  4 GI - Nutritional support   5 CVS - BP is stable, higher this am, repeat pending     Molly Haile, [facility]  [phone]        NEPHROLOGY     Patient seen and examined resting comfortably, no new complaints, in no acute distress.     MEDICATIONS  (STANDING):  amLODIPine   Tablet 5 milliGRAM(s) Oral daily  chlorhexidine 2% Cloths 1 Application(s) Topical two times a day  chlorhexidine 2% Cloths 1 Application(s) Topical daily  dextrose 5%. 1000 milliLiter(s) (100 mL/Hr) IV Continuous <Continuous>  dextrose 5%. 1000 milliLiter(s) (50 mL/Hr) IV Continuous <Continuous>  dextrose 50% Injectable 25 Gram(s) IV Push once  dextrose 50% Injectable 12.5 Gram(s) IV Push once  dextrose 50% Injectable 25 Gram(s) IV Push once  enoxaparin Injectable 40 milliGRAM(s) SubCutaneous every 24 hours  gabapentin 300 milliGRAM(s) Oral three times a day  glucagon  Injectable 1 milliGRAM(s) IntraMuscular once  influenza   Vaccine 0.5 milliLiter(s) IntraMuscular once  insulin lispro (ADMELOG) corrective regimen sliding scale   SubCutaneous three times a day before meals  insulin lispro (ADMELOG) corrective regimen sliding scale   SubCutaneous at bedtime  lisinopril 20 milliGRAM(s) Oral daily  polyethylene glycol 3350 17 Gram(s) Oral daily  senna 2 Tablet(s) Oral at bedtime    VITALS:  T(C): , Max: 37.3 (12-02-23 @ 13:02)  T(F): , Max: 99.2 (12-02-23 @ 13:02)  HR: 70 (12-03-23 @ 06:04)  BP: 168/85 (12-03-23 @ 06:04)  RR: 18 (12-03-23 @ 06:04)  SpO2: 95% (12-03-23 @ 06:04)    I and O's:    12-02 @ 07:01  -  12-03 @ 07:00  --------------------------------------------------------  IN: 480 mL / OUT: 1150 mL / NET: -670 mL    PHYSICAL EXAM:    Constitutional: NAD; cachetic   Neck:  No JVD  Respiratory: CTAB/L  Cardiovascular: S1 and S2  Gastrointestinal: BS+, soft, NT/ND  Extremities: No peripheral edema, R AKA   Neurological: A/O x 3, no focal deficits  Psychiatric: Normal mood, normal affect  : +  Pacheco  Skin: No rashes    LABS:                        8.5    9.45  )-----------( 266      ( 02 Dec 2023 07:11 )             28.1     12-02    136  |  102  |  19  ----------------------------<  157<H>  4.1   |  25  |  0.51    Ca    8.6      02 Dec 2023 07:10    ASSESSMENT/PLAN:   62 y/o male with pmhx htn, dm, hld  presents to the ED sent in from rehab center for right foot discoloration and pain  Hyponatremia - improving   Metabolic acidosis - improving  Wedge-shaped likely related  infection/pyelonephritis.  Hypertension     1 Renal - Resolved OLIVER;  Pacheco to gravity   Serum sodium is improving as of late, no new new labs this am   2 Vasc - SP guillotine BKA and dressing changes as well; s/p R above knee amputation with formalization (11/30)  3 ID - s/p Vanco  4 GI - Nutritional support   5 CVS - BP is stable, higher this am, repeat pending     Molly Haile, [facility]  [phone]        NEPHROLOGY     Patient seen and examined resting comfortably, no new complaints, in no acute distress.     MEDICATIONS  (STANDING):  amLODIPine   Tablet 5 milliGRAM(s) Oral daily  chlorhexidine 2% Cloths 1 Application(s) Topical two times a day  chlorhexidine 2% Cloths 1 Application(s) Topical daily  dextrose 5%. 1000 milliLiter(s) (100 mL/Hr) IV Continuous <Continuous>  dextrose 5%. 1000 milliLiter(s) (50 mL/Hr) IV Continuous <Continuous>  dextrose 50% Injectable 25 Gram(s) IV Push once  dextrose 50% Injectable 12.5 Gram(s) IV Push once  dextrose 50% Injectable 25 Gram(s) IV Push once  enoxaparin Injectable 40 milliGRAM(s) SubCutaneous every 24 hours  gabapentin 300 milliGRAM(s) Oral three times a day  glucagon  Injectable 1 milliGRAM(s) IntraMuscular once  influenza   Vaccine 0.5 milliLiter(s) IntraMuscular once  insulin lispro (ADMELOG) corrective regimen sliding scale   SubCutaneous three times a day before meals  insulin lispro (ADMELOG) corrective regimen sliding scale   SubCutaneous at bedtime  lisinopril 20 milliGRAM(s) Oral daily  polyethylene glycol 3350 17 Gram(s) Oral daily  senna 2 Tablet(s) Oral at bedtime    VITALS:  T(C): , Max: 37.3 (12-02-23 @ 13:02)  T(F): , Max: 99.2 (12-02-23 @ 13:02)  HR: 70 (12-03-23 @ 06:04)  BP: 168/85 (12-03-23 @ 06:04)  RR: 18 (12-03-23 @ 06:04)  SpO2: 95% (12-03-23 @ 06:04)    I and O's:    12-02 @ 07:01  -  12-03 @ 07:00  --------------------------------------------------------  IN: 480 mL / OUT: 1150 mL / NET: -670 mL    PHYSICAL EXAM:    Constitutional: NAD; cachetic   Neck:  No JVD  Respiratory: CTAB/L  Cardiovascular: S1 and S2  Gastrointestinal: BS+, soft, NT/ND  Extremities: No peripheral edema, R AKA   Neurological: A/O x 3, no focal deficits  Psychiatric: Normal mood, normal affect  : +  Pacheco  Skin: No rashes    LABS:                        8.5    9.45  )-----------( 266      ( 02 Dec 2023 07:11 )             28.1     12-02    136  |  102  |  19  ----------------------------<  157<H>  4.1   |  25  |  0.51    Ca    8.6      02 Dec 2023 07:10    ASSESSMENT/PLAN:   62 y/o male with pmhx htn, dm, hld  presents to the ED sent in from rehab center for right foot discoloration and pain  Hyponatremia - improving   Metabolic acidosis - improving  Wedge-shaped likely related  infection/pyelonephritis.  Hypertension     1 Renal - Resolved OLIVER;  Pacheco to gravity   Serum sodium is improving as of late, no new new labs this am   2 Vasc - SP guillotine BKA and dressing changes as well; s/p R above knee amputation with formalization (11/30)  3 ID - s/p Vanco  4 GI - Nutritional support   5 CVS - BP is stable, higher this am, repeat pending     Molly Haile, [facility]  [phone]

## 2023-12-03 NOTE — PROGRESS NOTE ADULT - ASSESSMENT
63 M pmhx htn, dm, hld presents to ED from rehab for acute limb ischemia.    Acute Limb  Ischemia     s/p Rt AKA  Vascular f/up appreciated  Wound Vac  Pain control with Oxy      DM HISS       HTN Home meds

## 2023-12-03 NOTE — PROGRESS NOTE ADULT - ASSESSMENT
Limb ischemia  sepsis   OLIVER  sp BKA     has renal infarct  OLIVER resolved  MARINO shows no evidence of vegetation   fu with ID and renal   fu with vascular surgery

## 2023-12-03 NOTE — PROGRESS NOTE ADULT - SUBJECTIVE AND OBJECTIVE BOX
SUBJECTIVE: Patient seen and examined on AM rounds. Patient reports that they're feeling well. Denies fever, chills. Reports pain as controlled. No complaints at this time.     Vital Signs Last 24 Hrs  T(C): 36.9 (03 Dec 2023 13:14), Max: 37.2 (03 Dec 2023 06:04)  T(F): 98.5 (03 Dec 2023 13:14), Max: 99 (03 Dec 2023 06:04)  HR: 77 (03 Dec 2023 13:14) (69 - 88)  BP: 167/80 (03 Dec 2023 13:14) (129/69 - 168/85)  BP(mean): --  RR: 18 (03 Dec 2023 13:14) (18 - 18)  SpO2: 98% (03 Dec 2023 13:14) (94% - 98%)    Parameters below as of 03 Dec 2023 13:14  Patient On (Oxygen Delivery Method): room air        General Appearance: Appears well, NAD  Neck: Supple  Chest: Equal expansion bilaterally  CV: Pulse regular presently  Abdomen: Soft, nontense  Extremities: R AKA under prevena, holding suction    I&O's Summary    02 Dec 2023 07:01  -  03 Dec 2023 07:00  --------------------------------------------------------  IN: 480 mL / OUT: 1350 mL / NET: -870 mL    03 Dec 2023 07:01  -  03 Dec 2023 15:33  --------------------------------------------------------  IN: 360 mL / OUT: 750 mL / NET: -390 mL      I&O's Detail    02 Dec 2023 07:01  -  03 Dec 2023 07:00  --------------------------------------------------------  IN:    Oral Fluid: 480 mL  Total IN: 480 mL    OUT:    Indwelling Catheter - Urethral (mL): 550 mL    Intermittent Catheterization - Urethral (mL): 800 mL    VAC (Vacuum Assisted Closure) System (mL): 0 mL  Total OUT: 1350 mL    Total NET: -870 mL      03 Dec 2023 07:01  -  03 Dec 2023 15:33  --------------------------------------------------------  IN:    Oral Fluid: 360 mL  Total IN: 360 mL    OUT:    Indwelling Catheter - Urethral (mL): 750 mL  Total OUT: 750 mL    Total NET: -390 mL          LABS:                        8.5    9.45  )-----------( 266      ( 02 Dec 2023 07:11 )             28.1     12-02    136  |  102  |  19  ----------------------------<  157<H>  4.1   |  25  |  0.51    Ca    8.6      02 Dec 2023 07:10        Urinalysis Basic - ( 02 Dec 2023 07:10 )    Color: x / Appearance: x / SG: x / pH: x  Gluc: 157 mg/dL / Ketone: x  / Bili: x / Urobili: x   Blood: x / Protein: x / Nitrite: x   Leuk Esterase: x / RBC: x / WBC x   Sq Epi: x / Non Sq Epi: x / Bacteria: x        RADIOLOGY & ADDITIONAL STUDIES:

## 2023-12-04 LAB
GLUCOSE BLDC GLUCOMTR-MCNC: 137 MG/DL — HIGH (ref 70–99)
GLUCOSE BLDC GLUCOMTR-MCNC: 137 MG/DL — HIGH (ref 70–99)
GLUCOSE BLDC GLUCOMTR-MCNC: 166 MG/DL — HIGH (ref 70–99)
GLUCOSE BLDC GLUCOMTR-MCNC: 166 MG/DL — HIGH (ref 70–99)
GLUCOSE BLDC GLUCOMTR-MCNC: 167 MG/DL — HIGH (ref 70–99)
GLUCOSE BLDC GLUCOMTR-MCNC: 167 MG/DL — HIGH (ref 70–99)
GLUCOSE BLDC GLUCOMTR-MCNC: 189 MG/DL — HIGH (ref 70–99)
GLUCOSE BLDC GLUCOMTR-MCNC: 189 MG/DL — HIGH (ref 70–99)
HCT VFR BLD CALC: 31.8 % — LOW (ref 39–50)
HCT VFR BLD CALC: 31.8 % — LOW (ref 39–50)
HGB BLD-MCNC: 9.6 G/DL — LOW (ref 13–17)
HGB BLD-MCNC: 9.6 G/DL — LOW (ref 13–17)
MCHC RBC-ENTMCNC: 24.9 PG — LOW (ref 27–34)
MCHC RBC-ENTMCNC: 24.9 PG — LOW (ref 27–34)
MCHC RBC-ENTMCNC: 30.2 GM/DL — LOW (ref 32–36)
MCHC RBC-ENTMCNC: 30.2 GM/DL — LOW (ref 32–36)
MCV RBC AUTO: 82.6 FL — SIGNIFICANT CHANGE UP (ref 80–100)
MCV RBC AUTO: 82.6 FL — SIGNIFICANT CHANGE UP (ref 80–100)
NRBC # BLD: 0 /100 WBCS — SIGNIFICANT CHANGE UP (ref 0–0)
NRBC # BLD: 0 /100 WBCS — SIGNIFICANT CHANGE UP (ref 0–0)
PLATELET # BLD AUTO: 308 K/UL — SIGNIFICANT CHANGE UP (ref 150–400)
PLATELET # BLD AUTO: 308 K/UL — SIGNIFICANT CHANGE UP (ref 150–400)
RBC # BLD: 3.85 M/UL — LOW (ref 4.2–5.8)
RBC # BLD: 3.85 M/UL — LOW (ref 4.2–5.8)
RBC # FLD: 17.6 % — HIGH (ref 10.3–14.5)
RBC # FLD: 17.6 % — HIGH (ref 10.3–14.5)
WBC # BLD: 9.22 K/UL — SIGNIFICANT CHANGE UP (ref 3.8–10.5)
WBC # BLD: 9.22 K/UL — SIGNIFICANT CHANGE UP (ref 3.8–10.5)
WBC # FLD AUTO: 9.22 K/UL — SIGNIFICANT CHANGE UP (ref 3.8–10.5)
WBC # FLD AUTO: 9.22 K/UL — SIGNIFICANT CHANGE UP (ref 3.8–10.5)

## 2023-12-04 RX ADMIN — LISINOPRIL 20 MILLIGRAM(S): 2.5 TABLET ORAL at 05:48

## 2023-12-04 RX ADMIN — ENOXAPARIN SODIUM 40 MILLIGRAM(S): 100 INJECTION SUBCUTANEOUS at 18:38

## 2023-12-04 RX ADMIN — OXYCODONE HYDROCHLORIDE 5 MILLIGRAM(S): 5 TABLET ORAL at 11:24

## 2023-12-04 RX ADMIN — SENNA PLUS 2 TABLET(S): 8.6 TABLET ORAL at 21:29

## 2023-12-04 RX ADMIN — GABAPENTIN 300 MILLIGRAM(S): 400 CAPSULE ORAL at 05:47

## 2023-12-04 RX ADMIN — DULOXETINE HYDROCHLORIDE 30 MILLIGRAM(S): 30 CAPSULE, DELAYED RELEASE ORAL at 11:10

## 2023-12-04 RX ADMIN — OXYCODONE HYDROCHLORIDE 5 MILLIGRAM(S): 5 TABLET ORAL at 10:24

## 2023-12-04 RX ADMIN — GABAPENTIN 300 MILLIGRAM(S): 400 CAPSULE ORAL at 13:05

## 2023-12-04 RX ADMIN — Medication 1: at 13:03

## 2023-12-04 RX ADMIN — OXYCODONE HYDROCHLORIDE 5 MILLIGRAM(S): 5 TABLET ORAL at 21:28

## 2023-12-04 RX ADMIN — AMLODIPINE BESYLATE 5 MILLIGRAM(S): 2.5 TABLET ORAL at 05:47

## 2023-12-04 RX ADMIN — POLYETHYLENE GLYCOL 3350 17 GRAM(S): 17 POWDER, FOR SOLUTION ORAL at 11:10

## 2023-12-04 RX ADMIN — Medication 1: at 09:36

## 2023-12-04 RX ADMIN — CHLORHEXIDINE GLUCONATE 1 APPLICATION(S): 213 SOLUTION TOPICAL at 11:14

## 2023-12-04 RX ADMIN — OXYCODONE HYDROCHLORIDE 5 MILLIGRAM(S): 5 TABLET ORAL at 22:00

## 2023-12-04 RX ADMIN — GABAPENTIN 300 MILLIGRAM(S): 400 CAPSULE ORAL at 21:29

## 2023-12-04 NOTE — PROGRESS NOTE ADULT - SUBJECTIVE AND OBJECTIVE BOX
[facility]OLOGY[facility] [phone]        Patient seen and examined in bed.  He was the same         MEDICATIONS  (STANDING):  amLODIPine   Tablet 5 milliGRAM(s) Oral daily  chlorhexidine 2% Cloths 1 Application(s) Topical daily  chlorhexidine 2% Cloths 1 Application(s) Topical two times a day  dextrose 5%. 1000 milliLiter(s) (100 mL/Hr) IV Continuous <Continuous>  dextrose 5%. 1000 milliLiter(s) (50 mL/Hr) IV Continuous <Continuous>  dextrose 50% Injectable 12.5 Gram(s) IV Push once  dextrose 50% Injectable 25 Gram(s) IV Push once  dextrose 50% Injectable 25 Gram(s) IV Push once  DULoxetine 30 milliGRAM(s) Oral daily  enoxaparin Injectable 40 milliGRAM(s) SubCutaneous every 24 hours  gabapentin 300 milliGRAM(s) Oral three times a day  glucagon  Injectable 1 milliGRAM(s) IntraMuscular once  influenza   Vaccine 0.5 milliLiter(s) IntraMuscular once  insulin lispro (ADMELOG) corrective regimen sliding scale   SubCutaneous three times a day before meals  insulin lispro (ADMELOG) corrective regimen sliding scale   SubCutaneous at bedtime  lisinopril 20 milliGRAM(s) Oral daily  polyethylene glycol 3350 17 Gram(s) Oral daily  senna 2 Tablet(s) Oral at bedtime      VITAL:  T(C): , Max: 37.1 (12-03-23 @ 10:01)  T(F): , Max: 98.8 (12-03-23 @ 10:01)  HR: 74 (12-04-23 @ 05:43)  BP: 156/80 (12-04-23 @ 05:43)  BP(mean): --  RR: 18 (12-04-23 @ 05:43)  SpO2: 95% (12-04-23 @ 05:43)  Wt(kg): --    I and O's:    12-03 @ 07:01  -  12-04 @ 07:00  --------------------------------------------------------  IN: 1040 mL / OUT: 2300 mL / NET: -1260 mL          PHYSICAL EXAM:    Constitutional: cachetic   Neck:  No JVD  Respiratory: CTAB/L  Cardiovascular: S1 and S2  Gastrointestinal: BS+, soft, NT/ND  Extremities: No peripheral edema + AKA   Neurological: A/O x 3, no focal deficits  Psychiatric: Normal mood, normal affect  : + Pacheco  Skin: No rashes  Access: Not applicable    LABS:                        9.6    9.22  )-----------( 308      ( 04 Dec 2023 07:24 )             31.8                 Urine Studies:          RADIOLOGY & ADDITIONAL STUDIES:             [facility] [phone]        Patient seen and examined in bed.  He was the same         MEDICATIONS  (STANDING):  amLODIPine   Tablet 5 milliGRAM(s) Oral daily  chlorhexidine 2% Cloths 1 Application(s) Topical daily  chlorhexidine 2% Cloths 1 Application(s) Topical two times a day  dextrose 5%. 1000 milliLiter(s) (100 mL/Hr) IV Continuous <Continuous>  dextrose 5%. 1000 milliLiter(s) (50 mL/Hr) IV Continuous <Continuous>  dextrose 50% Injectable 12.5 Gram(s) IV Push once  dextrose 50% Injectable 25 Gram(s) IV Push once  dextrose 50% Injectable 25 Gram(s) IV Push once  DULoxetine 30 milliGRAM(s) Oral daily  enoxaparin Injectable 40 milliGRAM(s) SubCutaneous every 24 hours  gabapentin 300 milliGRAM(s) Oral three times a day  glucagon  Injectable 1 milliGRAM(s) IntraMuscular once  influenza   Vaccine 0.5 milliLiter(s) IntraMuscular once  insulin lispro (ADMELOG) corrective regimen sliding scale   SubCutaneous three times a day before meals  insulin lispro (ADMELOG) corrective regimen sliding scale   SubCutaneous at bedtime  lisinopril 20 milliGRAM(s) Oral daily  polyethylene glycol 3350 17 Gram(s) Oral daily  senna 2 Tablet(s) Oral at bedtime      VITAL:  T(C): , Max: 37.1 (12-03-23 @ 10:01)  T(F): , Max: 98.8 (12-03-23 @ 10:01)  HR: 74 (12-04-23 @ 05:43)  BP: 156/80 (12-04-23 @ 05:43)  BP(mean): --  RR: 18 (12-04-23 @ 05:43)  SpO2: 95% (12-04-23 @ 05:43)  Wt(kg): --    I and O's:    12-03 @ 07:01  -  12-04 @ 07:00  --------------------------------------------------------  IN: 1040 mL / OUT: 2300 mL / NET: -1260 mL          PHYSICAL EXAM:    Constitutional: cachetic   Neck:  No JVD  Respiratory: CTAB/L  Cardiovascular: S1 and S2  Gastrointestinal: BS+, soft, NT/ND  Extremities: No peripheral edema + AKA   Neurological: A/O x 3, no focal deficits  Psychiatric: Normal mood, normal affect  : + Pacheco  Skin: No rashes  Access: Not applicable    LABS:                        9.6    9.22  )-----------( 308      ( 04 Dec 2023 07:24 )             31.8                 Urine Studies:          RADIOLOGY & ADDITIONAL STUDIES:

## 2023-12-04 NOTE — PROGRESS NOTE ADULT - SUBJECTIVE AND OBJECTIVE BOX
Please refer to previous dental consult note for additional information.     Dental evaluation for 1 week f/u post-extraction of #25.    MEDICATIONS  (STANDING):  amLODIPine   Tablet 5 milliGRAM(s) Oral daily  chlorhexidine 2% Cloths 1 Application(s) Topical two times a day  chlorhexidine 2% Cloths 1 Application(s) Topical daily  dextrose 5%. 1000 milliLiter(s) (50 mL/Hr) IV Continuous <Continuous>  dextrose 5%. 1000 milliLiter(s) (100 mL/Hr) IV Continuous <Continuous>  dextrose 50% Injectable 12.5 Gram(s) IV Push once  dextrose 50% Injectable 25 Gram(s) IV Push once  dextrose 50% Injectable 25 Gram(s) IV Push once  DULoxetine 30 milliGRAM(s) Oral daily  enoxaparin Injectable 40 milliGRAM(s) SubCutaneous every 24 hours  gabapentin 300 milliGRAM(s) Oral three times a day  glucagon  Injectable 1 milliGRAM(s) IntraMuscular once  influenza   Vaccine 0.5 milliLiter(s) IntraMuscular once  insulin lispro (ADMELOG) corrective regimen sliding scale   SubCutaneous three times a day before meals  insulin lispro (ADMELOG) corrective regimen sliding scale   SubCutaneous at bedtime  lisinopril 20 milliGRAM(s) Oral daily  polyethylene glycol 3350 17 Gram(s) Oral daily  senna 2 Tablet(s) Oral at bedtime    MEDICATIONS  (PRN):  acetaminophen     Tablet .. 650 milliGRAM(s) Oral every 6 hours PRN Temp greater or equal to 38C (100.4F)  dextrose Oral Gel 15 Gram(s) Oral once PRN Blood Glucose LESS THAN 70 milliGRAM(s)/deciliter  ondansetron Injectable 4 milliGRAM(s) IV Push once PRN Nausea and/or Vomiting  oxyCODONE    IR 5 milliGRAM(s) Oral every 6 hours PRN Severe Pain (7 - 10)      Allergies: penicillin (Anaphylaxis)    Vital Signs Last 24 Hrs  T(C): 37.4 (04 Dec 2023 17:02), Max: 37.4 (04 Dec 2023 17:02)  T(F): 99.3 (04 Dec 2023 17:02), Max: 99.3 (04 Dec 2023 17:02)  HR: 82 (04 Dec 2023 17:02) (70 - 82)  BP: 121/78 (04 Dec 2023 17:02) (120/72 - 177/85)  BP(mean): --  RR: 18 (04 Dec 2023 17:02) (18 - 18)  SpO2: 94% (04 Dec 2023 17:02) (94% - 95%)    Parameters below as of 04 Dec 2023 17:02  Patient On (Oxygen Delivery Method): room air      WBC Count: 9.22 K/uL [3.80 - 10.50] (12-04 @ 07:24)  Platelet Count - Automated: 308 K/uL [150 - 400] (12-04 @ 07:24)      PROCEDURE: Limited bedside exam completed with pt verbal consent. Discussed findings with pt, all questions were answered. Tooth #25 socket appears to be healing WNL. No signs of swelling, infection, abscess, or fistula noted. Sutures removed with surgical scissor and college pliers. Pt reports no pain or discomfort at this time.     RECOMMENDATIONS:  1) F/U with outpatient dentist for comprehensive dental care upon discharge.    Shante Garrett DDS #[id number]   Please refer to previous dental consult note for additional information.     Dental evaluation for 1 week f/u post-extraction of #25.    MEDICATIONS  (STANDING):  amLODIPine   Tablet 5 milliGRAM(s) Oral daily  chlorhexidine 2% Cloths 1 Application(s) Topical two times a day  chlorhexidine 2% Cloths 1 Application(s) Topical daily  dextrose 5%. 1000 milliLiter(s) (50 mL/Hr) IV Continuous <Continuous>  dextrose 5%. 1000 milliLiter(s) (100 mL/Hr) IV Continuous <Continuous>  dextrose 50% Injectable 12.5 Gram(s) IV Push once  dextrose 50% Injectable 25 Gram(s) IV Push once  dextrose 50% Injectable 25 Gram(s) IV Push once  DULoxetine 30 milliGRAM(s) Oral daily  enoxaparin Injectable 40 milliGRAM(s) SubCutaneous every 24 hours  gabapentin 300 milliGRAM(s) Oral three times a day  glucagon  Injectable 1 milliGRAM(s) IntraMuscular once  influenza   Vaccine 0.5 milliLiter(s) IntraMuscular once  insulin lispro (ADMELOG) corrective regimen sliding scale   SubCutaneous three times a day before meals  insulin lispro (ADMELOG) corrective regimen sliding scale   SubCutaneous at bedtime  lisinopril 20 milliGRAM(s) Oral daily  polyethylene glycol 3350 17 Gram(s) Oral daily  senna 2 Tablet(s) Oral at bedtime    MEDICATIONS  (PRN):  acetaminophen     Tablet .. 650 milliGRAM(s) Oral every 6 hours PRN Temp greater or equal to 38C (100.4F)  dextrose Oral Gel 15 Gram(s) Oral once PRN Blood Glucose LESS THAN 70 milliGRAM(s)/deciliter  ondansetron Injectable 4 milliGRAM(s) IV Push once PRN Nausea and/or Vomiting  oxyCODONE    IR 5 milliGRAM(s) Oral every 6 hours PRN Severe Pain (7 - 10)      Allergies: penicillin (Anaphylaxis)    Vital Signs Last 24 Hrs  T(C): 37.4 (04 Dec 2023 17:02), Max: 37.4 (04 Dec 2023 17:02)  T(F): 99.3 (04 Dec 2023 17:02), Max: 99.3 (04 Dec 2023 17:02)  HR: 82 (04 Dec 2023 17:02) (70 - 82)  BP: 121/78 (04 Dec 2023 17:02) (120/72 - 177/85)  BP(mean): --  RR: 18 (04 Dec 2023 17:02) (18 - 18)  SpO2: 94% (04 Dec 2023 17:02) (94% - 95%)    Parameters below as of 04 Dec 2023 17:02  Patient On (Oxygen Delivery Method): room air      WBC Count: 9.22 K/uL [3.80 - 10.50] (12-04 @ 07:24)  Platelet Count - Automated: 308 K/uL [150 - 400] (12-04 @ 07:24)      PROCEDURE: Limited bedside exam completed with pt verbal consent. Discussed findings with pt, all questions were answered. Tooth #25 socket appears to be healing WNL. No signs of swelling, infection, abscess, or fistula noted. Sutures removed with surgical scissor and college pliers. Pt reports no pain or discomfort at this time.     RECOMMENDATIONS:  1) F/U with outpatient dentist for comprehensive dental care upon discharge.    Shante Garrett DDS #[id number]

## 2023-12-04 NOTE — PROGRESS NOTE ADULT - SUBJECTIVE AND OBJECTIVE BOX
Subjective: Patient seen and examined. No new events except as noted.     SUBJECTIVE/ROS:  nad      MEDICATIONS:  MEDICATIONS  (STANDING):  amLODIPine   Tablet 5 milliGRAM(s) Oral daily  chlorhexidine 2% Cloths 1 Application(s) Topical daily  chlorhexidine 2% Cloths 1 Application(s) Topical two times a day  dextrose 5%. 1000 milliLiter(s) (100 mL/Hr) IV Continuous <Continuous>  dextrose 5%. 1000 milliLiter(s) (50 mL/Hr) IV Continuous <Continuous>  dextrose 50% Injectable 25 Gram(s) IV Push once  dextrose 50% Injectable 12.5 Gram(s) IV Push once  dextrose 50% Injectable 25 Gram(s) IV Push once  DULoxetine 30 milliGRAM(s) Oral daily  enoxaparin Injectable 40 milliGRAM(s) SubCutaneous every 24 hours  gabapentin 300 milliGRAM(s) Oral three times a day  glucagon  Injectable 1 milliGRAM(s) IntraMuscular once  influenza   Vaccine 0.5 milliLiter(s) IntraMuscular once  insulin lispro (ADMELOG) corrective regimen sliding scale   SubCutaneous three times a day before meals  insulin lispro (ADMELOG) corrective regimen sliding scale   SubCutaneous at bedtime  lisinopril 20 milliGRAM(s) Oral daily  polyethylene glycol 3350 17 Gram(s) Oral daily  senna 2 Tablet(s) Oral at bedtime      PHYSICAL EXAM:  T(C): 36.8 (12-04-23 @ 05:43), Max: 37.1 (12-03-23 @ 10:01)  HR: 74 (12-04-23 @ 05:43) (74 - 88)  BP: 156/80 (12-04-23 @ 05:43) (129/69 - 177/85)  RR: 18 (12-04-23 @ 05:43) (18 - 18)  SpO2: 95% (12-04-23 @ 05:43) (94% - 98%)  Wt(kg): --  I&O's Summary    03 Dec 2023 07:01  -  04 Dec 2023 07:00  --------------------------------------------------------  IN: 1040 mL / OUT: 2300 mL / NET: -1260 mL            JVP: Normal  Neck: supple  Lung: clear   CV: S1 S2 , Murmur:  Abd: soft  Ext: No edema  neuro: Awake / alert  Psych: flat affect      LABS/DATA:    CARDIAC MARKERS:                                9.6    9.22  )-----------( 308      ( 04 Dec 2023 07:24 )             31.8           proBNP:   Lipid Profile:   HgA1c:   TSH:     TELE:  EKG:

## 2023-12-04 NOTE — PROGRESS NOTE ADULT - ASSESSMENT
64 y/o male with pmhx htn, dm, hld  presents to the ED sent in from rehab center for right foot discoloration and pain  Hyponatremia - improving   Metabolic acidosis - improving  Wedge-shaped likely related  infection/pyelonephritis and not infarct.  Hypertension     1 Renal - Resolved OLIVER;  Pacheco to gravity   Serum sodium is improving as of late, no new new labs this am   2 Vasc - SP guillotine BKA and dressing changes as well; s/p R above knee amputation with formalization (11/30)  3 ID - s/p Vanco  4 GI - Nutritional support   5 CVS - BP is still not at goal and may increase the lisinopril in am     Sayed [facility]   [phone]        62 y/o male with pmhx htn, dm, hld  presents to the ED sent in from rehab center for right foot discoloration and pain  Hyponatremia - improving   Metabolic acidosis - improving  Wedge-shaped likely related  infection/pyelonephritis and not infarct.  Hypertension     1 Renal - Resolved OLIVER;  Pacheco to gravity   Serum sodium is improving as of late, no new new labs this am   2 Vasc - SP guillotine BKA and dressing changes as well; s/p R above knee amputation with formalization (11/30)  3 ID - s/p Vanco  4 GI - Nutritional support   5 CVS - BP is still not at goal and may increase the lisinopril in am     Sayed [facility]   [phone]

## 2023-12-04 NOTE — PROGRESS NOTE ADULT - SUBJECTIVE AND OBJECTIVE BOX
Patient is a 63y old  Male who presents with a chief complaint of Rt foot pain (03 Dec 2023 15:33)      SUBJECTIVE / OVERNIGHT EVENTS:    Events noted.  CONSTITUTIONAL: No fever,  or fatigue  RESPIRATORY: No cough, wheezing,  No shortness of breath  CARDIOVASCULAR: No chest pain, palpitations, dizziness, or leg swelling  GASTROINTESTINAL: No abdominal or epigastric pain.       T(C): 36.6 (12-04-23 @ 10:38), Max: 36.9 (12-04-23 @ 01:09)  HR: 70 (12-04-23 @ 10:38) (70 - 80)  BP: 144/80 (12-04-23 @ 10:38) (144/80 - 167/83)  RR: 18 (12-04-23 @ 10:38) (18 - 18)  SpO2: 95% (12-04-23 @ 10:38) (94% - 95%)    MEDICATIONS  (STANDING):  amLODIPine   Tablet 5 milliGRAM(s) Oral daily  chlorhexidine 2% Cloths 1 Application(s) Topical daily  chlorhexidine 2% Cloths 1 Application(s) Topical two times a day  dextrose 5%. 1000 milliLiter(s) (50 mL/Hr) IV Continuous <Continuous>  dextrose 5%. 1000 milliLiter(s) (100 mL/Hr) IV Continuous <Continuous>  dextrose 50% Injectable 12.5 Gram(s) IV Push once  dextrose 50% Injectable 25 Gram(s) IV Push once  dextrose 50% Injectable 25 Gram(s) IV Push once  DULoxetine 30 milliGRAM(s) Oral daily  enoxaparin Injectable 40 milliGRAM(s) SubCutaneous every 24 hours  gabapentin 300 milliGRAM(s) Oral three times a day  glucagon  Injectable 1 milliGRAM(s) IntraMuscular once  influenza   Vaccine 0.5 milliLiter(s) IntraMuscular once  insulin lispro (ADMELOG) corrective regimen sliding scale   SubCutaneous three times a day before meals  insulin lispro (ADMELOG) corrective regimen sliding scale   SubCutaneous at bedtime  lisinopril 20 milliGRAM(s) Oral daily  polyethylene glycol 3350 17 Gram(s) Oral daily  senna 2 Tablet(s) Oral at bedtime    MEDICATIONS  (PRN):  acetaminophen     Tablet .. 650 milliGRAM(s) Oral every 6 hours PRN Temp greater or equal to 38C (100.4F)  dextrose Oral Gel 15 Gram(s) Oral once PRN Blood Glucose LESS THAN 70 milliGRAM(s)/deciliter  ondansetron Injectable 4 milliGRAM(s) IV Push once PRN Nausea and/or Vomiting  oxyCODONE    IR 5 milliGRAM(s) Oral every 6 hours PRN Severe Pain (7 - 10)    PHYSICAL EXAM:  GENERAL: NAD  NECK: Supple, No JVD  CHEST/LUNG: Clear to auscultation bilaterally; No wheezing.  HEART: Regular rate and rhythm; No murmurs, rubs, or gallops  ABDOMEN: Soft, Nontender, Nondistended; Bowel sounds present  EXTREMITIES:  Rt AKA  NEUROLOGY: AAO X 3                              9.6    9.22  )-----------( 308      ( 04 Dec 2023 07:24 )             31.8                       Urinalysis Basic - ( 02 Dec 2023 07:10 )    Color: x / Appearance: x / SG: x / pH: x  Gluc: 157 mg/dL / Ketone: x  / Bili: x / Urobili: x   Blood: x / Protein: x / Nitrite: x   Leuk Esterase: x / RBC: x / WBC x   Sq Epi: x / Non Sq Epi: x / Bacteria: x      CAPILLARY BLOOD GLUCOSE      POCT Blood Glucose.: 152 mg/dL (03 Dec 2023 17:53)  POCT Blood Glucose.: 214 mg/dL (03 Dec 2023 13:36)  POCT Blood Glucose.: 240 mg/dL (03 Dec 2023 13:03)  POCT Blood Glucose.: 244 mg/dL (03 Dec 2023 09:55)  POCT Blood Glucose.: 199 mg/dL (02 Dec 2023 22:00)        RADIOLOGY & ADDITIONAL TESTS:    Imaging Personally Reviewed:    Consultant(s) Notes Reviewed:      Care Discussed with Consultants/Other Providers:    Edmond Schmitz MD, CMD, FACP    [address] [address]  Office Tel: [phone]  Cell: [phone]   Patient is a 63y old  Male who presents with a chief complaint of Rt foot pain (03 Dec 2023 15:33)      SUBJECTIVE / OVERNIGHT EVENTS:    Events noted.  CONSTITUTIONAL: No fever,  or fatigue  RESPIRATORY: No cough, wheezing,  No shortness of breath  CARDIOVASCULAR: No chest pain, palpitations, dizziness, or leg swelling  GASTROINTESTINAL: No abdominal or epigastric pain.       T(C): 36.6 (12-04-23 @ 10:38), Max: 36.9 (12-04-23 @ 01:09)  HR: 70 (12-04-23 @ 10:38) (70 - 80)  BP: 144/80 (12-04-23 @ 10:38) (144/80 - 167/83)  RR: 18 (12-04-23 @ 10:38) (18 - 18)  SpO2: 95% (12-04-23 @ 10:38) (94% - 95%)    MEDICATIONS  (STANDING):  amLODIPine   Tablet 5 milliGRAM(s) Oral daily  chlorhexidine 2% Cloths 1 Application(s) Topical daily  chlorhexidine 2% Cloths 1 Application(s) Topical two times a day  dextrose 5%. 1000 milliLiter(s) (50 mL/Hr) IV Continuous <Continuous>  dextrose 5%. 1000 milliLiter(s) (100 mL/Hr) IV Continuous <Continuous>  dextrose 50% Injectable 12.5 Gram(s) IV Push once  dextrose 50% Injectable 25 Gram(s) IV Push once  dextrose 50% Injectable 25 Gram(s) IV Push once  DULoxetine 30 milliGRAM(s) Oral daily  enoxaparin Injectable 40 milliGRAM(s) SubCutaneous every 24 hours  gabapentin 300 milliGRAM(s) Oral three times a day  glucagon  Injectable 1 milliGRAM(s) IntraMuscular once  influenza   Vaccine 0.5 milliLiter(s) IntraMuscular once  insulin lispro (ADMELOG) corrective regimen sliding scale   SubCutaneous three times a day before meals  insulin lispro (ADMELOG) corrective regimen sliding scale   SubCutaneous at bedtime  lisinopril 20 milliGRAM(s) Oral daily  polyethylene glycol 3350 17 Gram(s) Oral daily  senna 2 Tablet(s) Oral at bedtime    MEDICATIONS  (PRN):  acetaminophen     Tablet .. 650 milliGRAM(s) Oral every 6 hours PRN Temp greater or equal to 38C (100.4F)  dextrose Oral Gel 15 Gram(s) Oral once PRN Blood Glucose LESS THAN 70 milliGRAM(s)/deciliter  ondansetron Injectable 4 milliGRAM(s) IV Push once PRN Nausea and/or Vomiting  oxyCODONE    IR 5 milliGRAM(s) Oral every 6 hours PRN Severe Pain (7 - 10)    PHYSICAL EXAM:  GENERAL: NAD  NECK: Supple, No JVD  CHEST/LUNG: Clear to auscultation bilaterally; No wheezing.  HEART: Regular rate and rhythm; No murmurs, rubs, or gallops  ABDOMEN: Soft, Nontender, Nondistended; Bowel sounds present  EXTREMITIES:  Rt AKA  NEUROLOGY: AAO X 3                              9.6    9.22  )-----------( 308      ( 04 Dec 2023 07:24 )             31.8                       Urinalysis Basic - ( 02 Dec 2023 07:10 )    Color: x / Appearance: x / SG: x / pH: x  Gluc: 157 mg/dL / Ketone: x  / Bili: x / Urobili: x   Blood: x / Protein: x / Nitrite: x   Leuk Esterase: x / RBC: x / WBC x   Sq Epi: x / Non Sq Epi: x / Bacteria: x      CAPILLARY BLOOD GLUCOSE      POCT Blood Glucose.: 152 mg/dL (03 Dec 2023 17:53)  POCT Blood Glucose.: 214 mg/dL (03 Dec 2023 13:36)  POCT Blood Glucose.: 240 mg/dL (03 Dec 2023 13:03)  POCT Blood Glucose.: 244 mg/dL (03 Dec 2023 09:55)  POCT Blood Glucose.: 199 mg/dL (02 Dec 2023 22:00)        RADIOLOGY & ADDITIONAL TESTS:    Imaging Personally Reviewed:    Consultant(s) Notes Reviewed:      Care Discussed with Consultants/Other Providers:    Edmond Schmitz MD, CMD, FACP    [address] [address]  Office Tel: [phone]  Cell: [phone]

## 2023-12-04 NOTE — PROGRESS NOTE ADULT - ASSESSMENT
63M PMHx HTN, DM, HLD, presented to ED from rehab for acute limb ischemia. Patient was discharged about 1 week ago from OSH to rehab for conservative medical treatment of foot infection and later brought to ED for increased pain and acute discoloration of foot. Does not weightbear on right leg and has not ambulated in months. Patient now s/p R kiel ESPARZA 11/19, recovering appropriately.     Recommendations:  - s/p R AKA  - vac therapy - down today  - pain control  - care per medical team   - Please call vascular surgery with any new questions.    Plan discussed with fellow on behalf of attending.     Vascular Surgery   [id number] 63M PMHx HTN, DM, HLD, presented to ED from rehab for acute limb ischemia. Patient was discharged about 1 week ago from OSH to rehab for conservative medical treatment of foot infection and later brought to ED for increased pain and acute discoloration of foot. Does not weightbear on right leg and has not ambulated in months. Patient now s/p R kiel ESPARZA 11/19, recovering appropriately.     Recommendations:  - s/p R AKA  - vac therapy - down today  - pain control  - care per medical team   - Please call vascular surgery with any new questions.    Plan discussed with fellow on behalf of attending.     Vascular Surgery   [id number] 63M PMHx HTN, DM, HLD, presented to ED from rehab for acute limb ischemia. Patient was discharged about 1 week ago from OSH to rehab for conservative medical treatment of foot infection and later brought to ED for increased pain and acute discoloration of foot. Does not weightbear on right leg and has not ambulated in months. Patient now s/p R annearlyn ISAIAS 11/19, recovering appropriately.     Recommendations:  - Prevena vac down today, incision C/D/I with staples  - Daily dressing changes with Xeroform, gauze, and tape or Tegaderm  - pain control  - PT consult  - Remainder of care per primary team  - Please call vascular surgery with any new questions.    Plan discussed with fellow on behalf of attending.     Vascular Surgery   [id number] 63M PMHx HTN, DM, HLD, presented to ED from rehab for acute limb ischemia. Patient was discharged about 1 week ago from OSH to rehab for conservative medical treatment of foot infection and later brought to ED for increased pain and acute discoloration of foot. Does not weightbear on right leg and has not ambulated in months. Patient now s/p R annearlyn ISAIAS 11/19, recovering appropriately.     Recommendations:  - Prevena vac down today, incision C/D/I with staples  - Daily dressing changes with Xeroform, gauze, and tape or Tegaderm  - pain control  - PT consult  - Remainder of care per primary team  - Please call vascular surgery with any new questions.    Plan discussed with fellow on behalf of attending.     Vascular Surgery   [id number]

## 2023-12-04 NOTE — PROGRESS NOTE ADULT - SUBJECTIVE AND OBJECTIVE BOX
SUBJECTIVE: Patient seen and examined on AM rounds. Patient reports that they're feeling well. Denies fever, chills. Reports pain as controlled. No complaints at this time.     Vital Signs Last 24 Hrs  T(C): 36.8 (04 Dec 2023 05:43), Max: 37.1 (03 Dec 2023 10:01)  T(F): 98.2 (04 Dec 2023 05:43), Max: 98.8 (03 Dec 2023 10:01)  HR: 74 (04 Dec 2023 05:43) (74 - 88)  BP: 156/80 (04 Dec 2023 05:43) (129/69 - 177/85)  BP(mean): --  RR: 18 (04 Dec 2023 05:43) (18 - 18)  SpO2: 95% (04 Dec 2023 05:43) (94% - 98%)    Parameters below as of 04 Dec 2023 05:43  Patient On (Oxygen Delivery Method): room air        General Appearance: Appears well, NAD  Neck: Supple  Chest: Equal expansion bilaterally  CV: Pulse regular presently  Abdomen: Soft, nontense  Extremities: Prevena in place on R AKA    I&O's Summary    03 Dec 2023 07:01  -  04 Dec 2023 07:00  --------------------------------------------------------  IN: 1040 mL / OUT: 2300 mL / NET: -1260 mL      I&O's Detail    03 Dec 2023 07:01  -  04 Dec 2023 07:00  --------------------------------------------------------  IN:    Oral Fluid: 1040 mL  Total IN: 1040 mL    OUT:    Indwelling Catheter - Urethral (mL): 2050 mL    Voided (mL): 250 mL  Total OUT: 2300 mL    Total NET: -1260 mL          LABS:                        9.6    9.22  )-----------( 308      ( 04 Dec 2023 07:24 )             31.8      SUBJECTIVE: Patient seen and examined on AM rounds. Patient reports that they're feeling well. Denies fever, chills. Reports pain as controlled. No complaints at this time.     Vital Signs Last 24 Hrs  T(C): 36.8 (04 Dec 2023 05:43), Max: 37.1 (03 Dec 2023 10:01)  T(F): 98.2 (04 Dec 2023 05:43), Max: 98.8 (03 Dec 2023 10:01)  HR: 74 (04 Dec 2023 05:43) (74 - 88)  BP: 156/80 (04 Dec 2023 05:43) (129/69 - 177/85)  BP(mean): --  RR: 18 (04 Dec 2023 05:43) (18 - 18)  SpO2: 95% (04 Dec 2023 05:43) (94% - 98%)    Parameters below as of 04 Dec 2023 05:43  Patient On (Oxygen Delivery Method): room air        General Appearance: Appears well, NAD  Neck: Supple  Chest: Equal expansion bilaterally  CV: Pulse regular presently  Abdomen: Soft, nontense  Extremities: Prevena in place on R AKA, dressing removed. Underlying incision C/D/I with staples. New dressing with Xeroform, gauze, and Tegaderm applied    I&O's Summary    03 Dec 2023 07:01  -  04 Dec 2023 07:00  --------------------------------------------------------  IN: 1040 mL / OUT: 2300 mL / NET: -1260 mL      I&O's Detail    03 Dec 2023 07:01  -  04 Dec 2023 07:00  --------------------------------------------------------  IN:    Oral Fluid: 1040 mL  Total IN: 1040 mL    OUT:    Indwelling Catheter - Urethral (mL): 2050 mL    Voided (mL): 250 mL  Total OUT: 2300 mL    Total NET: -1260 mL          LABS:                        9.6    9.22  )-----------( 308      ( 04 Dec 2023 07:24 )             31.8

## 2023-12-05 LAB
ANION GAP SERPL CALC-SCNC: 9 MMOL/L — SIGNIFICANT CHANGE UP (ref 5–17)
ANION GAP SERPL CALC-SCNC: 9 MMOL/L — SIGNIFICANT CHANGE UP (ref 5–17)
BUN SERPL-MCNC: 19 MG/DL — SIGNIFICANT CHANGE UP (ref 7–23)
BUN SERPL-MCNC: 19 MG/DL — SIGNIFICANT CHANGE UP (ref 7–23)
CALCIUM SERPL-MCNC: 8.5 MG/DL — SIGNIFICANT CHANGE UP (ref 8.4–10.5)
CALCIUM SERPL-MCNC: 8.5 MG/DL — SIGNIFICANT CHANGE UP (ref 8.4–10.5)
CHLORIDE SERPL-SCNC: 101 MMOL/L — SIGNIFICANT CHANGE UP (ref 96–108)
CHLORIDE SERPL-SCNC: 101 MMOL/L — SIGNIFICANT CHANGE UP (ref 96–108)
CO2 SERPL-SCNC: 25 MMOL/L — SIGNIFICANT CHANGE UP (ref 22–31)
CO2 SERPL-SCNC: 25 MMOL/L — SIGNIFICANT CHANGE UP (ref 22–31)
CREAT SERPL-MCNC: 0.52 MG/DL — SIGNIFICANT CHANGE UP (ref 0.5–1.3)
CREAT SERPL-MCNC: 0.52 MG/DL — SIGNIFICANT CHANGE UP (ref 0.5–1.3)
EGFR: 113 ML/MIN/1.73M2 — SIGNIFICANT CHANGE UP
EGFR: 113 ML/MIN/1.73M2 — SIGNIFICANT CHANGE UP
GLUCOSE BLDC GLUCOMTR-MCNC: 154 MG/DL — HIGH (ref 70–99)
GLUCOSE BLDC GLUCOMTR-MCNC: 154 MG/DL — HIGH (ref 70–99)
GLUCOSE BLDC GLUCOMTR-MCNC: 193 MG/DL — HIGH (ref 70–99)
GLUCOSE BLDC GLUCOMTR-MCNC: 193 MG/DL — HIGH (ref 70–99)
GLUCOSE BLDC GLUCOMTR-MCNC: 264 MG/DL — HIGH (ref 70–99)
GLUCOSE BLDC GLUCOMTR-MCNC: 264 MG/DL — HIGH (ref 70–99)
GLUCOSE BLDC GLUCOMTR-MCNC: 307 MG/DL — HIGH (ref 70–99)
GLUCOSE BLDC GLUCOMTR-MCNC: 307 MG/DL — HIGH (ref 70–99)
GLUCOSE SERPL-MCNC: 223 MG/DL — HIGH (ref 70–99)
GLUCOSE SERPL-MCNC: 223 MG/DL — HIGH (ref 70–99)
HCT VFR BLD CALC: 32.1 % — LOW (ref 39–50)
HCT VFR BLD CALC: 32.1 % — LOW (ref 39–50)
HGB BLD-MCNC: 9.8 G/DL — LOW (ref 13–17)
HGB BLD-MCNC: 9.8 G/DL — LOW (ref 13–17)
MCHC RBC-ENTMCNC: 24.9 PG — LOW (ref 27–34)
MCHC RBC-ENTMCNC: 24.9 PG — LOW (ref 27–34)
MCHC RBC-ENTMCNC: 30.5 GM/DL — LOW (ref 32–36)
MCHC RBC-ENTMCNC: 30.5 GM/DL — LOW (ref 32–36)
MCV RBC AUTO: 81.5 FL — SIGNIFICANT CHANGE UP (ref 80–100)
MCV RBC AUTO: 81.5 FL — SIGNIFICANT CHANGE UP (ref 80–100)
NRBC # BLD: 0 /100 WBCS — SIGNIFICANT CHANGE UP (ref 0–0)
NRBC # BLD: 0 /100 WBCS — SIGNIFICANT CHANGE UP (ref 0–0)
PLATELET # BLD AUTO: 356 K/UL — SIGNIFICANT CHANGE UP (ref 150–400)
PLATELET # BLD AUTO: 356 K/UL — SIGNIFICANT CHANGE UP (ref 150–400)
POTASSIUM SERPL-MCNC: 3.7 MMOL/L — SIGNIFICANT CHANGE UP (ref 3.5–5.3)
POTASSIUM SERPL-MCNC: 3.7 MMOL/L — SIGNIFICANT CHANGE UP (ref 3.5–5.3)
POTASSIUM SERPL-SCNC: 3.7 MMOL/L — SIGNIFICANT CHANGE UP (ref 3.5–5.3)
POTASSIUM SERPL-SCNC: 3.7 MMOL/L — SIGNIFICANT CHANGE UP (ref 3.5–5.3)
RBC # BLD: 3.94 M/UL — LOW (ref 4.2–5.8)
RBC # BLD: 3.94 M/UL — LOW (ref 4.2–5.8)
RBC # FLD: 18 % — HIGH (ref 10.3–14.5)
RBC # FLD: 18 % — HIGH (ref 10.3–14.5)
SODIUM SERPL-SCNC: 135 MMOL/L — SIGNIFICANT CHANGE UP (ref 135–145)
SODIUM SERPL-SCNC: 135 MMOL/L — SIGNIFICANT CHANGE UP (ref 135–145)
WBC # BLD: 9.22 K/UL — SIGNIFICANT CHANGE UP (ref 3.8–10.5)
WBC # BLD: 9.22 K/UL — SIGNIFICANT CHANGE UP (ref 3.8–10.5)
WBC # FLD AUTO: 9.22 K/UL — SIGNIFICANT CHANGE UP (ref 3.8–10.5)
WBC # FLD AUTO: 9.22 K/UL — SIGNIFICANT CHANGE UP (ref 3.8–10.5)

## 2023-12-05 RX ORDER — TAMSULOSIN HYDROCHLORIDE 0.4 MG/1
0.4 CAPSULE ORAL AT BEDTIME
Refills: 0 | Status: DISCONTINUED | OUTPATIENT
Start: 2023-12-05 | End: 2023-12-08

## 2023-12-05 RX ORDER — ACETAMINOPHEN 500 MG
1000 TABLET ORAL ONCE
Refills: 0 | Status: COMPLETED | OUTPATIENT
Start: 2023-12-05 | End: 2023-12-05

## 2023-12-05 RX ADMIN — GABAPENTIN 300 MILLIGRAM(S): 400 CAPSULE ORAL at 13:36

## 2023-12-05 RX ADMIN — OXYCODONE HYDROCHLORIDE 5 MILLIGRAM(S): 5 TABLET ORAL at 13:47

## 2023-12-05 RX ADMIN — GABAPENTIN 300 MILLIGRAM(S): 400 CAPSULE ORAL at 21:47

## 2023-12-05 RX ADMIN — LISINOPRIL 20 MILLIGRAM(S): 2.5 TABLET ORAL at 06:08

## 2023-12-05 RX ADMIN — OXYCODONE HYDROCHLORIDE 5 MILLIGRAM(S): 5 TABLET ORAL at 14:45

## 2023-12-05 RX ADMIN — Medication 1000 MILLIGRAM(S): at 03:10

## 2023-12-05 RX ADMIN — CHLORHEXIDINE GLUCONATE 1 APPLICATION(S): 213 SOLUTION TOPICAL at 13:35

## 2023-12-05 RX ADMIN — GABAPENTIN 300 MILLIGRAM(S): 400 CAPSULE ORAL at 06:10

## 2023-12-05 RX ADMIN — DULOXETINE HYDROCHLORIDE 30 MILLIGRAM(S): 30 CAPSULE, DELAYED RELEASE ORAL at 13:36

## 2023-12-05 RX ADMIN — TAMSULOSIN HYDROCHLORIDE 0.4 MILLIGRAM(S): 0.4 CAPSULE ORAL at 21:47

## 2023-12-05 RX ADMIN — Medication 4: at 10:38

## 2023-12-05 RX ADMIN — OXYCODONE HYDROCHLORIDE 5 MILLIGRAM(S): 5 TABLET ORAL at 06:12

## 2023-12-05 RX ADMIN — Medication 1: at 13:45

## 2023-12-05 RX ADMIN — POLYETHYLENE GLYCOL 3350 17 GRAM(S): 17 POWDER, FOR SOLUTION ORAL at 13:36

## 2023-12-05 RX ADMIN — OXYCODONE HYDROCHLORIDE 5 MILLIGRAM(S): 5 TABLET ORAL at 07:00

## 2023-12-05 RX ADMIN — AMLODIPINE BESYLATE 5 MILLIGRAM(S): 2.5 TABLET ORAL at 06:08

## 2023-12-05 RX ADMIN — SENNA PLUS 2 TABLET(S): 8.6 TABLET ORAL at 21:47

## 2023-12-05 RX ADMIN — Medication 400 MILLIGRAM(S): at 02:32

## 2023-12-05 RX ADMIN — ENOXAPARIN SODIUM 40 MILLIGRAM(S): 100 INJECTION SUBCUTANEOUS at 18:06

## 2023-12-05 RX ADMIN — Medication 3: at 18:07

## 2023-12-05 NOTE — CHART NOTE - NSCHARTNOTEFT_GEN_A_CORE
Pt has been having Urinary retention and straight cath performed 3 times, will place li. Pt has been having Urinary retention since li taken out yesterday and straight cath performed 2 times, will replace li.

## 2023-12-05 NOTE — PROGRESS NOTE ADULT - SUBJECTIVE AND OBJECTIVE BOX
Subjective: Patient seen and examined. No new events except as noted.     SUBJECTIVE/ROS:  nad      MEDICATIONS:  MEDICATIONS  (STANDING):  amLODIPine   Tablet 5 milliGRAM(s) Oral daily  chlorhexidine 2% Cloths 1 Application(s) Topical two times a day  chlorhexidine 2% Cloths 1 Application(s) Topical daily  dextrose 5%. 1000 milliLiter(s) (100 mL/Hr) IV Continuous <Continuous>  dextrose 5%. 1000 milliLiter(s) (50 mL/Hr) IV Continuous <Continuous>  dextrose 50% Injectable 12.5 Gram(s) IV Push once  dextrose 50% Injectable 25 Gram(s) IV Push once  dextrose 50% Injectable 25 Gram(s) IV Push once  DULoxetine 30 milliGRAM(s) Oral daily  enoxaparin Injectable 40 milliGRAM(s) SubCutaneous every 24 hours  gabapentin 300 milliGRAM(s) Oral three times a day  glucagon  Injectable 1 milliGRAM(s) IntraMuscular once  influenza   Vaccine 0.5 milliLiter(s) IntraMuscular once  insulin lispro (ADMELOG) corrective regimen sliding scale   SubCutaneous three times a day before meals  insulin lispro (ADMELOG) corrective regimen sliding scale   SubCutaneous at bedtime  lisinopril 20 milliGRAM(s) Oral daily  polyethylene glycol 3350 17 Gram(s) Oral daily  senna 2 Tablet(s) Oral at bedtime      PHYSICAL EXAM:  T(C): 36.8 (12-05-23 @ 10:17), Max: 37.4 (12-04-23 @ 17:02)  HR: 77 (12-05-23 @ 10:17) (71 - 87)  BP: 125/73 (12-05-23 @ 10:17) (120/72 - 151/71)  RR: 18 (12-05-23 @ 10:17) (18 - 18)  SpO2: 94% (12-05-23 @ 10:17) (94% - 95%)  Wt(kg): --  I&O's Summary    04 Dec 2023 07:01  -  05 Dec 2023 07:00  --------------------------------------------------------  IN: 1210 mL / OUT: 1200 mL / NET: 10 mL    05 Dec 2023 07:01  -  05 Dec 2023 11:36  --------------------------------------------------------  IN: 240 mL / OUT: 0 mL / NET: 240 mL            JVP: Normal  Neck: supple  Lung: clear   CV: S1 S2 , Murmur:  Abd: soft  Ext: No edema  neuro: Awake / alert  Psych: flat affect      LABS/DATA:    CARDIAC MARKERS:                                9.8    9.22  )-----------( 356      ( 05 Dec 2023 07:27 )             32.1     12-05    135  |  101  |  19  ----------------------------<  223<H>  3.7   |  25  |  0.52    Ca    8.5      05 Dec 2023 07:27      proBNP:   Lipid Profile:   HgA1c:   TSH:     TELE:  EKG:

## 2023-12-05 NOTE — PROGRESS NOTE ADULT - SUBJECTIVE AND OBJECTIVE BOX
Patient is a 63y old  Male who presents with a chief complaint of Rt foot pain (03 Dec 2023 15:33)      SUBJECTIVE / OVERNIGHT EVENTS:    Events noted.  CONSTITUTIONAL: No fever,  or fatigue  RESPIRATORY: No cough, wheezing,  No shortness of breath  CARDIOVASCULAR: No chest pain, palpitations, dizziness, or leg swelling  GASTROINTESTINAL: No abdominal or epigastric pain.       T(C): 36.7 (12-05-23 @ 22:00), Max: 37 (12-05-23 @ 17:17)  HR: 66 (12-05-23 @ 22:00) (66 - 72)  BP: 134/69 (12-05-23 @ 22:00) (134/69 - 146/72)  RR: 17 (12-05-23 @ 22:00) (17 - 18)  SpO2: 93% (12-05-23 @ 22:00) (93% - 97%)      MEDICATIONS  (STANDING):  amLODIPine   Tablet 5 milliGRAM(s) Oral daily  chlorhexidine 2% Cloths 1 Application(s) Topical daily  chlorhexidine 2% Cloths 1 Application(s) Topical two times a day  dextrose 5%. 1000 milliLiter(s) (100 mL/Hr) IV Continuous <Continuous>  dextrose 5%. 1000 milliLiter(s) (50 mL/Hr) IV Continuous <Continuous>  dextrose 50% Injectable 12.5 Gram(s) IV Push once  dextrose 50% Injectable 25 Gram(s) IV Push once  dextrose 50% Injectable 25 Gram(s) IV Push once  DULoxetine 30 milliGRAM(s) Oral daily  enoxaparin Injectable 40 milliGRAM(s) SubCutaneous every 24 hours  gabapentin 300 milliGRAM(s) Oral three times a day  glucagon  Injectable 1 milliGRAM(s) IntraMuscular once  influenza   Vaccine 0.5 milliLiter(s) IntraMuscular once  insulin lispro (ADMELOG) corrective regimen sliding scale   SubCutaneous three times a day before meals  insulin lispro (ADMELOG) corrective regimen sliding scale   SubCutaneous at bedtime  lisinopril 20 milliGRAM(s) Oral daily  polyethylene glycol 3350 17 Gram(s) Oral daily  senna 2 Tablet(s) Oral at bedtime  tamsulosin 0.4 milliGRAM(s) Oral at bedtime    MEDICATIONS  (PRN):  acetaminophen     Tablet .. 650 milliGRAM(s) Oral every 6 hours PRN Temp greater or equal to 38C (100.4F)  dextrose Oral Gel 15 Gram(s) Oral once PRN Blood Glucose LESS THAN 70 milliGRAM(s)/deciliter  ondansetron Injectable 4 milliGRAM(s) IV Push once PRN Nausea and/or Vomiting  oxyCODONE    IR 5 milliGRAM(s) Oral every 6 hours PRN Severe Pain (7 - 10)    PHYSICAL EXAM:  GENERAL: NAD  NECK: Supple, No JVD  CHEST/LUNG: Clear to auscultation bilaterally; No wheezing.  HEART: Regular rate and rhythm; No murmurs, rubs, or gallops  ABDOMEN: Soft, Nontender, Nondistended; Bowel sounds present  EXTREMITIES:  Rt AKA  NEUROLOGY: AAO X 3                              9.6    9.22  )-----------( 308      ( 04 Dec 2023 07:24 )             31.8               RADIOLOGY & ADDITIONAL TESTS:    Imaging Personally Reviewed:    Consultant(s) Notes Reviewed:      Care Discussed with Consultants/Other Providers:

## 2023-12-05 NOTE — PROGRESS NOTE ADULT - SUBJECTIVE AND OBJECTIVE BOX
[facility] [phone]        Patient seen and examined in bed.  He was the same         MEDICATIONS  (STANDING):  amLODIPine   Tablet 5 milliGRAM(s) Oral daily  chlorhexidine 2% Cloths 1 Application(s) Topical two times a day  chlorhexidine 2% Cloths 1 Application(s) Topical daily  dextrose 5%. 1000 milliLiter(s) (100 mL/Hr) IV Continuous <Continuous>  dextrose 5%. 1000 milliLiter(s) (50 mL/Hr) IV Continuous <Continuous>  dextrose 50% Injectable 12.5 Gram(s) IV Push once  dextrose 50% Injectable 25 Gram(s) IV Push once  dextrose 50% Injectable 25 Gram(s) IV Push once  DULoxetine 30 milliGRAM(s) Oral daily  enoxaparin Injectable 40 milliGRAM(s) SubCutaneous every 24 hours  gabapentin 300 milliGRAM(s) Oral three times a day  glucagon  Injectable 1 milliGRAM(s) IntraMuscular once  influenza   Vaccine 0.5 milliLiter(s) IntraMuscular once  insulin lispro (ADMELOG) corrective regimen sliding scale   SubCutaneous three times a day before meals  insulin lispro (ADMELOG) corrective regimen sliding scale   SubCutaneous at bedtime  lisinopril 20 milliGRAM(s) Oral daily  polyethylene glycol 3350 17 Gram(s) Oral daily  senna 2 Tablet(s) Oral at bedtime      VITAL:  T(C): , Max: 37.4 (12-04-23 @ 17:02)  T(F): , Max: 99.3 (12-04-23 @ 17:02)  HR: 71 (12-05-23 @ 05:33)  BP: 135/78 (12-05-23 @ 05:33)  BP(mean): --  RR: 18 (12-05-23 @ 05:33)  SpO2: 95% (12-05-23 @ 05:33)  Wt(kg): --    I and O's:    12-04 @ 07:01  -  12-05 @ 07:00  --------------------------------------------------------  IN: 1210 mL / OUT: 1200 mL / NET: 10 mL          PHYSICAL EXAM:    Constitutional: NAD; cachetic   Neck:  No JVD  Respiratory: CTAB/L  Cardiovascular: S1 and S2  Gastrointestinal: BS+, soft, NT/ND  Extremities: No peripheral edema + amputation   Neurological: A/O x 3, no focal deficits  Psychiatric: Normal mood, normal affect  : +  Pacheco  Skin: No rashes  Access: Not applicable    LABS:                        9.8    9.22  )-----------( 356      ( 05 Dec 2023 07:27 )             32.1     12-05    135  |  101  |  19  ----------------------------<  223<H>  3.7   |  25  |  0.52    Ca    8.5      05 Dec 2023 07:27            Urine Studies:  Urinalysis Basic - ( 05 Dec 2023 07:27 )    Color: x / Appearance: x / SG: x / pH: x  Gluc: 223 mg/dL / Ketone: x  / Bili: x / Urobili: x   Blood: x / Protein: x / Nitrite: x   Leuk Esterase: x / RBC: x / WBC x   Sq Epi: x / Non Sq Epi: x / Bacteria: x            RADIOLOGY & ADDITIONAL STUDIES:             [facility] [phone]        Patient seen and examined in bed.  He was the same         MEDICATIONS  (STANDING):  amLODIPine   Tablet 5 milliGRAM(s) Oral daily  chlorhexidine 2% Cloths 1 Application(s) Topical two times a day  chlorhexidine 2% Cloths 1 Application(s) Topical daily  dextrose 5%. 1000 milliLiter(s) (100 mL/Hr) IV Continuous <Continuous>  dextrose 5%. 1000 milliLiter(s) (50 mL/Hr) IV Continuous <Continuous>  dextrose 50% Injectable 12.5 Gram(s) IV Push once  dextrose 50% Injectable 25 Gram(s) IV Push once  dextrose 50% Injectable 25 Gram(s) IV Push once  DULoxetine 30 milliGRAM(s) Oral daily  enoxaparin Injectable 40 milliGRAM(s) SubCutaneous every 24 hours  gabapentin 300 milliGRAM(s) Oral three times a day  glucagon  Injectable 1 milliGRAM(s) IntraMuscular once  influenza   Vaccine 0.5 milliLiter(s) IntraMuscular once  insulin lispro (ADMELOG) corrective regimen sliding scale   SubCutaneous three times a day before meals  insulin lispro (ADMELOG) corrective regimen sliding scale   SubCutaneous at bedtime  lisinopril 20 milliGRAM(s) Oral daily  polyethylene glycol 3350 17 Gram(s) Oral daily  senna 2 Tablet(s) Oral at bedtime      VITAL:  T(C): , Max: 37.4 (12-04-23 @ 17:02)  T(F): , Max: 99.3 (12-04-23 @ 17:02)  HR: 71 (12-05-23 @ 05:33)  BP: 135/78 (12-05-23 @ 05:33)  BP(mean): --  RR: 18 (12-05-23 @ 05:33)  SpO2: 95% (12-05-23 @ 05:33)  Wt(kg): --    I and O's:    12-04 @ 07:01  -  12-05 @ 07:00  --------------------------------------------------------  IN: 1210 mL / OUT: 1200 mL / NET: 10 mL          PHYSICAL EXAM:    Constitutional: NAD; cachetic   Neck:  No JVD  Respiratory: CTAB/L  Cardiovascular: S1 and S2  Gastrointestinal: BS+, soft, NT/ND  Extremities: No peripheral edema + amputation   Neurological: A/O x 3, no focal deficits  Psychiatric: Normal mood, normal affect  : +  Pacheco  Skin: No rashes  Access: Not applicable    LABS:                        9.8    9.22  )-----------( 356      ( 05 Dec 2023 07:27 )             32.1     12-05    135  |  101  |  19  ----------------------------<  223<H>  3.7   |  25  |  0.52    Ca    8.5      05 Dec 2023 07:27            Urine Studies:  Urinalysis Basic - ( 05 Dec 2023 07:27 )    Color: x / Appearance: x / SG: x / pH: x  Gluc: 223 mg/dL / Ketone: x  / Bili: x / Urobili: x   Blood: x / Protein: x / Nitrite: x   Leuk Esterase: x / RBC: x / WBC x   Sq Epi: x / Non Sq Epi: x / Bacteria: x            RADIOLOGY & ADDITIONAL STUDIES:

## 2023-12-05 NOTE — PROGRESS NOTE ADULT - ASSESSMENT
62 y/o male with pmhx htn, dm, hld  presents to the ED sent in from rehab center for right foot discoloration and pain  Hyponatremia - improving   Metabolic acidosis - improving  Wedge-shaped likely related  infection/pyelonephritis and not infarct.  Hypertension     1 Renal - Resolved OLIVER;  Pacheco to gravity   2 Vasc - SP guillotine BKA and dressing changes as well; s/p R above knee amputation with formalization (11/30)  3 ID - s/p Vanco  4 GI - Nutritional support   5 CVS - BP is better this am     Sayed [facility]   [phone]        62 y/o male with pmhx htn, dm, hld  presents to the ED sent in from rehab center for right foot discoloration and pain  Hyponatremia - improving   Metabolic acidosis - improving  Wedge-shaped likely related  infection/pyelonephritis and not infarct.  Hypertension     1 Renal - Resolved OLIVER;  Pacheco to gravity   2 Vasc - SP guillotine BKA and dressing changes as well; s/p R above knee amputation with formalization (11/30)  3 ID - s/p Vanco  4 GI - Nutritional support   5 CVS - BP is better this am     Sayed [facility]   [phone]

## 2023-12-05 NOTE — CHART NOTE - NSCHARTNOTESELECT_GEN_ALL_CORE
Event Note
Nutrition Services
Wound Care Team Note:/Event Note
wound surgery/Event Note
Event Note
Palliative Care/Event Note
Post Operative Check
Post-operative Check
Urianary retention >700 ml/Event Note

## 2023-12-05 NOTE — PROGRESS NOTE ADULT - ASSESSMENT
63 M pmhx htn, dm, hld presents to ED from rehab for acute limb ischemia.    Acute Limb  Ischemia     s/p Rt AKA  Vascular f/up appreciated  Wound Vac  Pain control with Oxy      DM HISS       HTN Home meds     D/C palnning     Urine culture   UA

## 2023-12-06 LAB
APPEARANCE UR: ABNORMAL
APPEARANCE UR: ABNORMAL
BACTERIA # UR AUTO: ABNORMAL /HPF
BACTERIA # UR AUTO: ABNORMAL /HPF
BILIRUB UR-MCNC: NEGATIVE — SIGNIFICANT CHANGE UP
BILIRUB UR-MCNC: NEGATIVE — SIGNIFICANT CHANGE UP
CAST: 0 /LPF — SIGNIFICANT CHANGE UP (ref 0–4)
CAST: 0 /LPF — SIGNIFICANT CHANGE UP (ref 0–4)
COLOR SPEC: SIGNIFICANT CHANGE UP
COLOR SPEC: SIGNIFICANT CHANGE UP
DIFF PNL FLD: ABNORMAL
DIFF PNL FLD: ABNORMAL
GLUCOSE BLDC GLUCOMTR-MCNC: 152 MG/DL — HIGH (ref 70–99)
GLUCOSE BLDC GLUCOMTR-MCNC: 152 MG/DL — HIGH (ref 70–99)
GLUCOSE BLDC GLUCOMTR-MCNC: 221 MG/DL — HIGH (ref 70–99)
GLUCOSE BLDC GLUCOMTR-MCNC: 221 MG/DL — HIGH (ref 70–99)
GLUCOSE BLDC GLUCOMTR-MCNC: 259 MG/DL — HIGH (ref 70–99)
GLUCOSE BLDC GLUCOMTR-MCNC: 259 MG/DL — HIGH (ref 70–99)
GLUCOSE UR QL: 100 MG/DL
GLUCOSE UR QL: 100 MG/DL
KETONES UR-MCNC: NEGATIVE MG/DL — SIGNIFICANT CHANGE UP
KETONES UR-MCNC: NEGATIVE MG/DL — SIGNIFICANT CHANGE UP
LEUKOCYTE ESTERASE UR-ACNC: ABNORMAL
LEUKOCYTE ESTERASE UR-ACNC: ABNORMAL
NITRITE UR-MCNC: POSITIVE
NITRITE UR-MCNC: POSITIVE
PH UR: 6 — SIGNIFICANT CHANGE UP (ref 5–8)
PH UR: 6 — SIGNIFICANT CHANGE UP (ref 5–8)
PROT UR-MCNC: 100 MG/DL
PROT UR-MCNC: 100 MG/DL
RBC CASTS # UR COMP ASSIST: 6 /HPF — HIGH (ref 0–4)
RBC CASTS # UR COMP ASSIST: 6 /HPF — HIGH (ref 0–4)
SP GR SPEC: 1.02 — SIGNIFICANT CHANGE UP (ref 1–1.03)
SP GR SPEC: 1.02 — SIGNIFICANT CHANGE UP (ref 1–1.03)
SQUAMOUS # UR AUTO: 0 /HPF — SIGNIFICANT CHANGE UP (ref 0–5)
SQUAMOUS # UR AUTO: 0 /HPF — SIGNIFICANT CHANGE UP (ref 0–5)
UROBILINOGEN FLD QL: 1 MG/DL — SIGNIFICANT CHANGE UP (ref 0.2–1)
UROBILINOGEN FLD QL: 1 MG/DL — SIGNIFICANT CHANGE UP (ref 0.2–1)
WBC UR QL: 147 /HPF — HIGH (ref 0–5)
WBC UR QL: 147 /HPF — HIGH (ref 0–5)

## 2023-12-06 RX ORDER — KETOROLAC TROMETHAMINE 30 MG/ML
15 SYRINGE (ML) INJECTION ONCE
Refills: 0 | Status: DISCONTINUED | OUTPATIENT
Start: 2023-12-06 | End: 2023-12-08

## 2023-12-06 RX ADMIN — CHLORHEXIDINE GLUCONATE 1 APPLICATION(S): 213 SOLUTION TOPICAL at 13:11

## 2023-12-06 RX ADMIN — Medication 1: at 18:06

## 2023-12-06 RX ADMIN — Medication 650 MILLIGRAM(S): at 18:58

## 2023-12-06 RX ADMIN — GABAPENTIN 300 MILLIGRAM(S): 400 CAPSULE ORAL at 05:26

## 2023-12-06 RX ADMIN — Medication 1 TABLET(S): at 19:00

## 2023-12-06 RX ADMIN — GABAPENTIN 300 MILLIGRAM(S): 400 CAPSULE ORAL at 13:10

## 2023-12-06 RX ADMIN — OXYCODONE HYDROCHLORIDE 5 MILLIGRAM(S): 5 TABLET ORAL at 13:09

## 2023-12-06 RX ADMIN — TAMSULOSIN HYDROCHLORIDE 0.4 MILLIGRAM(S): 0.4 CAPSULE ORAL at 21:43

## 2023-12-06 RX ADMIN — DULOXETINE HYDROCHLORIDE 30 MILLIGRAM(S): 30 CAPSULE, DELAYED RELEASE ORAL at 13:10

## 2023-12-06 RX ADMIN — ENOXAPARIN SODIUM 40 MILLIGRAM(S): 100 INJECTION SUBCUTANEOUS at 17:40

## 2023-12-06 RX ADMIN — AMLODIPINE BESYLATE 5 MILLIGRAM(S): 2.5 TABLET ORAL at 05:26

## 2023-12-06 RX ADMIN — SENNA PLUS 2 TABLET(S): 8.6 TABLET ORAL at 21:43

## 2023-12-06 RX ADMIN — Medication 650 MILLIGRAM(S): at 17:38

## 2023-12-06 RX ADMIN — LISINOPRIL 20 MILLIGRAM(S): 2.5 TABLET ORAL at 05:26

## 2023-12-06 RX ADMIN — Medication 3: at 14:41

## 2023-12-06 RX ADMIN — GABAPENTIN 300 MILLIGRAM(S): 400 CAPSULE ORAL at 21:43

## 2023-12-06 NOTE — PROGRESS NOTE ADULT - SUBJECTIVE AND OBJECTIVE BOX
[facility] [phone]        Patient seen and examined in bed.  He was the same         MEDICATIONS  (STANDING):  amLODIPine   Tablet 5 milliGRAM(s) Oral daily  chlorhexidine 2% Cloths 1 Application(s) Topical daily  chlorhexidine 2% Cloths 1 Application(s) Topical two times a day  dextrose 5%. 1000 milliLiter(s) (50 mL/Hr) IV Continuous <Continuous>  dextrose 5%. 1000 milliLiter(s) (100 mL/Hr) IV Continuous <Continuous>  dextrose 50% Injectable 12.5 Gram(s) IV Push once  dextrose 50% Injectable 25 Gram(s) IV Push once  dextrose 50% Injectable 25 Gram(s) IV Push once  DULoxetine 30 milliGRAM(s) Oral daily  enoxaparin Injectable 40 milliGRAM(s) SubCutaneous every 24 hours  gabapentin 300 milliGRAM(s) Oral three times a day  glucagon  Injectable 1 milliGRAM(s) IntraMuscular once  influenza   Vaccine 0.5 milliLiter(s) IntraMuscular once  insulin lispro (ADMELOG) corrective regimen sliding scale   SubCutaneous three times a day before meals  insulin lispro (ADMELOG) corrective regimen sliding scale   SubCutaneous at bedtime  lisinopril 20 milliGRAM(s) Oral daily  polyethylene glycol 3350 17 Gram(s) Oral daily  senna 2 Tablet(s) Oral at bedtime  tamsulosin 0.4 milliGRAM(s) Oral at bedtime      VITAL:  T(C): , Max: 37 (23 @ 17:17)  T(F): , Max: 98.6 (23 @ 17:17)  HR: 61 (23 @ 05:01)  BP: 144/70 (23 @ 05:01)  BP(mean): --  RR: 18 (23 @ 05:01)  SpO2: 95% (23 @ 05:01)  Wt(kg): --    I and O's:     07:01  -   @ 07:00  --------------------------------------------------------  IN: 1000 mL / OUT: 700 mL / NET: 300 mL    12 @ 07:01  -   @ 10:52  --------------------------------------------------------  IN: 0 mL / OUT: 1200 mL / NET: -1200 mL          PHYSICAL EXAM:    Constitutional: NAD  Neck:  No JVD  Respiratory: CTAB/L  Cardiovascular: S1 and S2  Gastrointestinal: BS+, soft, NT/ND  Extremities: No peripheral edema + amputation   Neurological: A/O x 3, no focal deficits  Psychiatric: Normal mood, normal affect  : +  Pacheco  Skin: No rashes  Access: Not applicable    LABS:                        9.8    9.22  )-----------( 356      ( 05 Dec 2023 07:27 )             32.1     12    135  |  101  |  19  ----------------------------<  223<H>  3.7   |  25  |  0.52    Ca    8.5      05 Dec 2023 07:27            Urine Studies:  Urinalysis Basic - ( 06 Dec 2023 08:34 )    Color: Dark Yellow / Appearance: Cloudy / S.022 / pH: x  Gluc: x / Ketone: Negative mg/dL  / Bili: Negative / Urobili: 1.0 mg/dL   Blood: x / Protein: 100 mg/dL / Nitrite: Positive   Leuk Esterase: Moderate / RBC: 6 /HPF /  /HPF   Sq Epi: x / Non Sq Epi: 0 /HPF / Bacteria: Many /HPF            RADIOLOGY & ADDITIONAL STUDIES:             [facility] [phone]        Patient seen and examined in bed.  He was the same         MEDICATIONS  (STANDING):  amLODIPine   Tablet 5 milliGRAM(s) Oral daily  chlorhexidine 2% Cloths 1 Application(s) Topical daily  chlorhexidine 2% Cloths 1 Application(s) Topical two times a day  dextrose 5%. 1000 milliLiter(s) (50 mL/Hr) IV Continuous <Continuous>  dextrose 5%. 1000 milliLiter(s) (100 mL/Hr) IV Continuous <Continuous>  dextrose 50% Injectable 12.5 Gram(s) IV Push once  dextrose 50% Injectable 25 Gram(s) IV Push once  dextrose 50% Injectable 25 Gram(s) IV Push once  DULoxetine 30 milliGRAM(s) Oral daily  enoxaparin Injectable 40 milliGRAM(s) SubCutaneous every 24 hours  gabapentin 300 milliGRAM(s) Oral three times a day  glucagon  Injectable 1 milliGRAM(s) IntraMuscular once  influenza   Vaccine 0.5 milliLiter(s) IntraMuscular once  insulin lispro (ADMELOG) corrective regimen sliding scale   SubCutaneous three times a day before meals  insulin lispro (ADMELOG) corrective regimen sliding scale   SubCutaneous at bedtime  lisinopril 20 milliGRAM(s) Oral daily  polyethylene glycol 3350 17 Gram(s) Oral daily  senna 2 Tablet(s) Oral at bedtime  tamsulosin 0.4 milliGRAM(s) Oral at bedtime      VITAL:  T(C): , Max: 37 (23 @ 17:17)  T(F): , Max: 98.6 (23 @ 17:17)  HR: 61 (23 @ 05:01)  BP: 144/70 (23 @ 05:01)  BP(mean): --  RR: 18 (23 @ 05:01)  SpO2: 95% (23 @ 05:01)  Wt(kg): --    I and O's:     07:01  -   @ 07:00  --------------------------------------------------------  IN: 1000 mL / OUT: 700 mL / NET: 300 mL    12 @ 07:01  -   @ 10:52  --------------------------------------------------------  IN: 0 mL / OUT: 1200 mL / NET: -1200 mL          PHYSICAL EXAM:    Constitutional: NAD  Neck:  No JVD  Respiratory: CTAB/L  Cardiovascular: S1 and S2  Gastrointestinal: BS+, soft, NT/ND  Extremities: No peripheral edema + amputation   Neurological: A/O x 3, no focal deficits  Psychiatric: Normal mood, normal affect  : +  Pacheco  Skin: No rashes  Access: Not applicable    LABS:                        9.8    9.22  )-----------( 356      ( 05 Dec 2023 07:27 )             32.1     12    135  |  101  |  19  ----------------------------<  223<H>  3.7   |  25  |  0.52    Ca    8.5      05 Dec 2023 07:27            Urine Studies:  Urinalysis Basic - ( 06 Dec 2023 08:34 )    Color: Dark Yellow / Appearance: Cloudy / S.022 / pH: x  Gluc: x / Ketone: Negative mg/dL  / Bili: Negative / Urobili: 1.0 mg/dL   Blood: x / Protein: 100 mg/dL / Nitrite: Positive   Leuk Esterase: Moderate / RBC: 6 /HPF /  /HPF   Sq Epi: x / Non Sq Epi: 0 /HPF / Bacteria: Many /HPF            RADIOLOGY & ADDITIONAL STUDIES:

## 2023-12-06 NOTE — PROGRESS NOTE ADULT - SUBJECTIVE AND OBJECTIVE BOX
Subjective: Patient seen and examined. No new events except as noted.     SUBJECTIVE/ROS:        MEDICATIONS:  MEDICATIONS  (STANDING):  amLODIPine   Tablet 5 milliGRAM(s) Oral daily  chlorhexidine 2% Cloths 1 Application(s) Topical daily  chlorhexidine 2% Cloths 1 Application(s) Topical two times a day  dextrose 5%. 1000 milliLiter(s) (100 mL/Hr) IV Continuous <Continuous>  dextrose 5%. 1000 milliLiter(s) (50 mL/Hr) IV Continuous <Continuous>  dextrose 50% Injectable 12.5 Gram(s) IV Push once  dextrose 50% Injectable 25 Gram(s) IV Push once  dextrose 50% Injectable 25 Gram(s) IV Push once  DULoxetine 30 milliGRAM(s) Oral daily  enoxaparin Injectable 40 milliGRAM(s) SubCutaneous every 24 hours  gabapentin 300 milliGRAM(s) Oral three times a day  glucagon  Injectable 1 milliGRAM(s) IntraMuscular once  influenza   Vaccine 0.5 milliLiter(s) IntraMuscular once  insulin lispro (ADMELOG) corrective regimen sliding scale   SubCutaneous three times a day before meals  insulin lispro (ADMELOG) corrective regimen sliding scale   SubCutaneous at bedtime  lisinopril 20 milliGRAM(s) Oral daily  polyethylene glycol 3350 17 Gram(s) Oral daily  senna 2 Tablet(s) Oral at bedtime  tamsulosin 0.4 milliGRAM(s) Oral at bedtime      PHYSICAL EXAM:  T(C): 36.7 (12-06-23 @ 05:01), Max: 37 (12-05-23 @ 17:17)  HR: 61 (12-06-23 @ 05:01) (61 - 72)  BP: 144/70 (12-06-23 @ 05:01) (127/67 - 146/72)  RR: 18 (12-06-23 @ 05:01) (17 - 18)  SpO2: 95% (12-06-23 @ 05:01) (93% - 97%)  Wt(kg): --  I&O's Summary    05 Dec 2023 07:01  -  06 Dec 2023 07:00  --------------------------------------------------------  IN: 1000 mL / OUT: 700 mL / NET: 300 mL    06 Dec 2023 07:01  -  06 Dec 2023 10:24  --------------------------------------------------------  IN: 0 mL / OUT: 1200 mL / NET: -1200 mL            JVP: Normal  Neck: supple  Lung: clear   CV: S1 S2 , Murmur:  Abd: soft  Ext: No edema  neuro: Awake / alert  Psych: flat affect      LABS/DATA:    CARDIAC MARKERS:                                9.8    9.22  )-----------( 356      ( 05 Dec 2023 07:27 )             32.1     12-05    135  |  101  |  19  ----------------------------<  223<H>  3.7   |  25  |  0.52    Ca    8.5      05 Dec 2023 07:27      proBNP:   Lipid Profile:   HgA1c:   TSH:     TELE:  EKG:

## 2023-12-06 NOTE — PROGRESS NOTE ADULT - SUBJECTIVE AND OBJECTIVE BOX
Patient is a 63y old  Male who presents with a chief complaint of Rt foot pain (03 Dec 2023 15:33)      SUBJECTIVE / OVERNIGHT EVENTS:    Events noted.  CONSTITUTIONAL: No fever,  or fatigue  RESPIRATORY: No cough, wheezing,  No shortness of breath  CARDIOVASCULAR: No chest pain, palpitations, dizziness, or leg swelling  GASTROINTESTINAL: No abdominal or epigastric pain.     T(C): 36.9 (12-06-23 @ 13:48), Max: 36.9 (12-06-23 @ 13:48)  HR: 74 (12-06-23 @ 13:48) (61 - 76)  BP: 143/74 (12-06-23 @ 13:48) (126/72 - 144/70)  RR: 18 (12-06-23 @ 13:48) (18 - 18)  SpO2: 96% (12-06-23 @ 13:48) (95% - 96%)        MEDICATIONS  (STANDING):  amLODIPine   Tablet 5 milliGRAM(s) Oral daily  chlorhexidine 2% Cloths 1 Application(s) Topical two times a day  chlorhexidine 2% Cloths 1 Application(s) Topical daily  dextrose 5%. 1000 milliLiter(s) (100 mL/Hr) IV Continuous <Continuous>  dextrose 5%. 1000 milliLiter(s) (50 mL/Hr) IV Continuous <Continuous>  dextrose 50% Injectable 12.5 Gram(s) IV Push once  dextrose 50% Injectable 25 Gram(s) IV Push once  dextrose 50% Injectable 25 Gram(s) IV Push once  DULoxetine 30 milliGRAM(s) Oral daily  enoxaparin Injectable 40 milliGRAM(s) SubCutaneous every 24 hours  gabapentin 300 milliGRAM(s) Oral three times a day  glucagon  Injectable 1 milliGRAM(s) IntraMuscular once  influenza   Vaccine 0.5 milliLiter(s) IntraMuscular once  insulin lispro (ADMELOG) corrective regimen sliding scale   SubCutaneous three times a day before meals  insulin lispro (ADMELOG) corrective regimen sliding scale   SubCutaneous at bedtime  lisinopril 20 milliGRAM(s) Oral daily  polyethylene glycol 3350 17 Gram(s) Oral daily  senna 2 Tablet(s) Oral at bedtime  tamsulosin 0.4 milliGRAM(s) Oral at bedtime    MEDICATIONS  (PRN):  acetaminophen     Tablet .. 650 milliGRAM(s) Oral every 6 hours PRN Temp greater or equal to 38C (100.4F)  dextrose Oral Gel 15 Gram(s) Oral once PRN Blood Glucose LESS THAN 70 milliGRAM(s)/deciliter  ondansetron Injectable 4 milliGRAM(s) IV Push once PRN Nausea and/or Vomiting  oxyCODONE    IR 5 milliGRAM(s) Oral every 6 hours PRN Severe Pain (7 - 10)      PHYSICAL EXAM:  GENERAL: NAD  NECK: Supple, No JVD  CHEST/LUNG: Clear to auscultation bilaterally; No wheezing.  HEART: Regular rate and rhythm; No murmurs, rubs, or gallops  ABDOMEN: Soft, Nontender, Nondistended; Bowel sounds present  EXTREMITIES:  Rt AKA  NEUROLOGY: AAO X 3                          9.8    9.22  )-----------( 356      ( 05 Dec 2023 07:27 )             32.1               :    Imaging Personally Reviewed:    Consultant(s) Notes Reviewed:      Care Discussed with Consultants/Other Providers:

## 2023-12-06 NOTE — PROGRESS NOTE ADULT - ASSESSMENT
62 y/o male with pmhx htn, dm, hld  presents to the ED sent in from rehab center for right foot discoloration and pain  Hyponatremia - improving   Metabolic acidosis - improving  Wedge-shaped likely related  infection/pyelonephritis and not infarct.  Hypertension     1 Renal - Resolved OLIVER;  Pacheco to gravity;  Normalized serum sodium   2 Vasc - SP guillotine BKA and dressing changes as well; s/p R above knee amputation with formalization (11/30)  3 ID - s/p Vanco  4 GI - Nutritional support   5 CVS - BP is acceptable     Sayed [facility]   [phone]        64 y/o male with pmhx htn, dm, hld  presents to the ED sent in from rehab center for right foot discoloration and pain  Hyponatremia - improving   Metabolic acidosis - improving  Wedge-shaped likely related  infection/pyelonephritis and not infarct.  Hypertension     1 Renal - Resolved OLIVER;  Pacheco to gravity;  Normalized serum sodium   2 Vasc - SP guillotine BKA and dressing changes as well; s/p R above knee amputation with formalization (11/30)  3 ID - s/p Vanco  4 GI - Nutritional support   5 CVS - BP is acceptable     Sayed [facility]   [phone]

## 2023-12-06 NOTE — PROGRESS NOTE ADULT - ASSESSMENT
63 M pmhx htn, dm, hld presents to ED from rehab for acute limb ischemia.    Acute Limb  Ischemia     s/p Rt AKA  Vascular f/up appreciated  Wound Vac  Pain control with Oxy      DM HISS       HTN Home meds     D/C palnning     Urine culture   UA positive   Add Ceftriaxone

## 2023-12-07 LAB
GLUCOSE BLDC GLUCOMTR-MCNC: 156 MG/DL — HIGH (ref 70–99)
GLUCOSE BLDC GLUCOMTR-MCNC: 156 MG/DL — HIGH (ref 70–99)
GLUCOSE BLDC GLUCOMTR-MCNC: 166 MG/DL — HIGH (ref 70–99)
GLUCOSE BLDC GLUCOMTR-MCNC: 166 MG/DL — HIGH (ref 70–99)
GLUCOSE BLDC GLUCOMTR-MCNC: 168 MG/DL — HIGH (ref 70–99)
GLUCOSE BLDC GLUCOMTR-MCNC: 168 MG/DL — HIGH (ref 70–99)
GLUCOSE BLDC GLUCOMTR-MCNC: 228 MG/DL — HIGH (ref 70–99)
GLUCOSE BLDC GLUCOMTR-MCNC: 228 MG/DL — HIGH (ref 70–99)

## 2023-12-07 PROCEDURE — 99232 SBSQ HOSP IP/OBS MODERATE 35: CPT

## 2023-12-07 RX ORDER — OXYCODONE HYDROCHLORIDE 5 MG/1
5 TABLET ORAL EVERY 6 HOURS
Refills: 0 | Status: DISCONTINUED | OUTPATIENT
Start: 2023-12-07 | End: 2023-12-08

## 2023-12-07 RX ADMIN — Medication 2: at 13:13

## 2023-12-07 RX ADMIN — Medication 1: at 09:50

## 2023-12-07 RX ADMIN — Medication 1: at 18:30

## 2023-12-07 RX ADMIN — GABAPENTIN 300 MILLIGRAM(S): 400 CAPSULE ORAL at 22:23

## 2023-12-07 RX ADMIN — OXYCODONE HYDROCHLORIDE 5 MILLIGRAM(S): 5 TABLET ORAL at 10:59

## 2023-12-07 RX ADMIN — ENOXAPARIN SODIUM 40 MILLIGRAM(S): 100 INJECTION SUBCUTANEOUS at 17:36

## 2023-12-07 RX ADMIN — CHLORHEXIDINE GLUCONATE 1 APPLICATION(S): 213 SOLUTION TOPICAL at 13:14

## 2023-12-07 RX ADMIN — OXYCODONE HYDROCHLORIDE 5 MILLIGRAM(S): 5 TABLET ORAL at 11:35

## 2023-12-07 RX ADMIN — SENNA PLUS 2 TABLET(S): 8.6 TABLET ORAL at 22:22

## 2023-12-07 RX ADMIN — TAMSULOSIN HYDROCHLORIDE 0.4 MILLIGRAM(S): 0.4 CAPSULE ORAL at 22:22

## 2023-12-07 RX ADMIN — LISINOPRIL 20 MILLIGRAM(S): 2.5 TABLET ORAL at 06:31

## 2023-12-07 RX ADMIN — Medication 1 TABLET(S): at 17:35

## 2023-12-07 RX ADMIN — GABAPENTIN 300 MILLIGRAM(S): 400 CAPSULE ORAL at 05:41

## 2023-12-07 RX ADMIN — GABAPENTIN 300 MILLIGRAM(S): 400 CAPSULE ORAL at 14:42

## 2023-12-07 RX ADMIN — OXYCODONE HYDROCHLORIDE 5 MILLIGRAM(S): 5 TABLET ORAL at 22:23

## 2023-12-07 RX ADMIN — OXYCODONE HYDROCHLORIDE 5 MILLIGRAM(S): 5 TABLET ORAL at 23:23

## 2023-12-07 RX ADMIN — Medication 1 TABLET(S): at 05:41

## 2023-12-07 NOTE — PROGRESS NOTE ADULT - SUBJECTIVE AND OBJECTIVE BOX
Subjective: Patient seen and examined. No new events except as noted.     SUBJECTIVE/ROS:  nad      MEDICATIONS:  MEDICATIONS  (STANDING):  amLODIPine   Tablet 5 milliGRAM(s) Oral daily  chlorhexidine 2% Cloths 1 Application(s) Topical two times a day  chlorhexidine 2% Cloths 1 Application(s) Topical daily  dextrose 5%. 1000 milliLiter(s) (100 mL/Hr) IV Continuous <Continuous>  dextrose 5%. 1000 milliLiter(s) (50 mL/Hr) IV Continuous <Continuous>  dextrose 50% Injectable 25 Gram(s) IV Push once  dextrose 50% Injectable 12.5 Gram(s) IV Push once  dextrose 50% Injectable 25 Gram(s) IV Push once  DULoxetine 30 milliGRAM(s) Oral daily  enoxaparin Injectable 40 milliGRAM(s) SubCutaneous every 24 hours  gabapentin 300 milliGRAM(s) Oral three times a day  glucagon  Injectable 1 milliGRAM(s) IntraMuscular once  influenza   Vaccine 0.5 milliLiter(s) IntraMuscular once  insulin lispro (ADMELOG) corrective regimen sliding scale   SubCutaneous three times a day before meals  insulin lispro (ADMELOG) corrective regimen sliding scale   SubCutaneous at bedtime  lisinopril 20 milliGRAM(s) Oral daily  polyethylene glycol 3350 17 Gram(s) Oral daily  senna 2 Tablet(s) Oral at bedtime  tamsulosin 0.4 milliGRAM(s) Oral at bedtime  trimethoprim  160 mG/sulfamethoxazole 800 mG 1 Tablet(s) Oral two times a day      PHYSICAL EXAM:  T(C): 36.7 (12-07-23 @ 05:32), Max: 37.1 (12-06-23 @ 21:20)  HR: 56 (12-07-23 @ 05:32) (56 - 76)  BP: 132/72 (12-07-23 @ 05:32) (126/72 - 150/72)  RR: 18 (12-07-23 @ 05:32) (18 - 18)  SpO2: 95% (12-07-23 @ 05:32) (95% - 98%)  Wt(kg): --  I&O's Summary    06 Dec 2023 07:01  -  07 Dec 2023 07:00  --------------------------------------------------------  IN: 845 mL / OUT: 2375 mL / NET: -1530 mL            JVP: Normal  Neck: supple  Lung: clear   CV: S1 S2 , Murmur:  Abd: soft  Ext: No edema  neuro: Awake / alert  Psych: flat affect  Skin: normal``    LABS/DATA:    CARDIAC MARKERS:                  proBNP:   Lipid Profile:   HgA1c:   TSH:     TELE:  EKG:

## 2023-12-07 NOTE — PROGRESS NOTE ADULT - ASSESSMENT
A/p 63 M pmhx htn, dm, hld presents to ED from rehab for acute limb ischemia.    Wound Consult requested to assist w/ management of Sacral unstageable pressure injury    Buttocks/ Sacrum TRIAD BID and prn soiling        Continue w/ attends under pads and Pericare w/ li cath maintenance as per protocol  Continue as per Vascular Rt AKA  Abx per Medicine  Moisturize intact skin w/ SWEEN cream BID  Nutrition Consult for optimization in pt w/ acute moderate malnutrition          encourage high quality protein, whit/ prosource, MVI & Vit C to promote wound healing  Hyperglycemia - ADA diet and  FS w/ ISS,   Continue turning and positioning w/ offloading assistive devices as per protocol  Waffle Cushion to chair when oob to chair  Continue w/ low air loss pressure redistribution bed surface   Pt will need Group 2 mattress on hospital bed and ROHO cushion for wheel chair upon discharge home  Care as per medicine, will follow w/ you  Upon discharge f/u as outpatient at Wound [facility] [address] [facility] [phone]  D/w team & RN & attng  Sima Alejo PA-C [id number]  Nights/ Weekends/ Holidays please call:  General Surgery Consult pager ([phone]) for emergencies  I spent 35minutes face to face w/ this pt of which more than 50% of the time was spent counseling & coordinating care of this pt.   Wound PT for multilayer leg wrapping or VAC issues (x [phone])    A/p 63 M pmhx htn, dm, hld presents to ED from rehab for acute limb ischemia.    Wound Consult requested to assist w/ management of Sacral unstageable pressure injury    Buttocks/ Sacrum TRIAD BID and prn soiling        Continue w/ attends under pads and Pericare w/ li cath maintenance as per protocol  Continue as per Vascular Rt AKA  Abx per Medicine  Moisturize intact skin w/ SWEEN cream BID  Nutrition Consult for optimization in pt w/ acute moderate malnutrition          encourage high quality protein, whit/ prosource, MVI & Vit C to promote wound healing  Hyperglycemia - ADA diet and  FS w/ ISS,   Continue turning and positioning w/ offloading assistive devices as per protocol  Waffle Cushion to chair when oob to chair  Continue w/ low air loss pressure redistribution bed surface   Pt will need Group 2 mattress on hospital bed and ROHO cushion for wheel chair upon discharge home  Care as per medicine, will follow w/ you  Upon discharge f/u as outpatient at Wound Center [address] [facility] [phone]  D/w team & RN & attng  Sima Alejo PA-C [id number]  Nights/ Weekends/ Holidays please call:  General Surgery Consult pager ([phone]) for emergencies  I spent 35minutes face to face w/ this pt of which more than 50% of the time was spent counseling & coordinating care of this pt.   Wound PT for multilayer leg wrapping or VAC issues (x [phone])

## 2023-12-07 NOTE — PROGRESS NOTE ADULT - NUTRITIONAL ASSESSMENT
This patient has been assessed with a concern for Malnutrition and has been determined to have a diagnosis/diagnoses of Moderate protein-calorie malnutrition.    This patient is being managed with:   Diet Consistent Carbohydrate/No Snacks-  No Concentrated Potassium  Supplement Feeding Modality:  Oral  Glucerna Shake Cans or Servings Per Day:  2       Frequency:  Daily  Entered: Nov 19 2023  8:35AM  
This patient has been assessed with a concern for Malnutrition and has been determined to have a diagnosis/diagnoses of Moderate protein-calorie malnutrition.    This patient is being managed with:   Diet Consistent Carbohydrate/No Snacks-  No Concentrated Potassium  Supplement Feeding Modality:  Oral  Glucerna Shake Cans or Servings Per Day:  2       Frequency:  Daily  Entered: Nov 30 2023  3:06PM  
This patient has been assessed with a concern for Malnutrition and has been determined to have a diagnosis/diagnoses of Moderate protein-calorie malnutrition.    This patient is being managed with:   Diet Consistent Carbohydrate/No Snacks-  Supplement Feeding Modality:  Oral  Glucerna Shake Cans or Servings Per Day:  2       Frequency:  Daily  Entered: Nov 17 2023  3:09PM    Diet Consistent Carbohydrate/No Snacks-  Entered: Nov 16 2023  9:10AM    The following pending diet order is being considered for treatment of Moderate protein-calorie malnutrition:null
This patient has been assessed with a concern for Malnutrition and has been determined to have a diagnosis/diagnoses of Moderate protein-calorie malnutrition.    This patient is being managed with:   Diet NPO after Midnight-     NPO Start Date: 26-Nov-2023   NPO Start Time: 23:59  Except Medications  Entered: Nov 26 2023 11:58PM    Diet Consistent Carbohydrate/No Snacks-  Jerry(7 Gm Arginine/7 Gm Glut/1.2 Gm HMB     Qty per Day:  2  Supplement Feeding Modality:  Oral  Glucerna Shake Cans or Servings Per Day:  2       Frequency:  Daily  Entered: Nov 22 2023  4:01PM    Diet Consistent Carbohydrate/No Snacks-  No Concentrated Potassium  Supplement Feeding Modality:  Oral  Glucerna Shake Cans or Servings Per Day:  2       Frequency:  Daily  Entered: Nov 22 2023 11:58AM    The following pending diet order is being considered for treatment of Moderate protein-calorie malnutrition:null
This patient has been assessed with a concern for Malnutrition and has been determined to have a diagnosis/diagnoses of Moderate protein-calorie malnutrition.    This patient is being managed with:   Diet Consistent Carbohydrate/No Snacks-  Jerry(7 Gm Arginine/7 Gm Glut/1.2 Gm HMB     Qty per Day:  2  Supplement Feeding Modality:  Oral  Glucerna Shake Cans or Servings Per Day:  2       Frequency:  Daily  Entered: Nov 22 2023  4:01PM    Diet Consistent Carbohydrate/No Snacks-  No Concentrated Potassium  Supplement Feeding Modality:  Oral  Glucerna Shake Cans or Servings Per Day:  2       Frequency:  Daily  Entered: Nov 22 2023 11:58AM    The following pending diet order is being considered for treatment of Moderate protein-calorie malnutrition:null
This patient has been assessed with a concern for Malnutrition and has been determined to have a diagnosis/diagnoses of Moderate protein-calorie malnutrition.    This patient is being managed with:   Diet Consistent Carbohydrate/No Snacks-  No Concentrated Potassium  Supplement Feeding Modality:  Oral  Glucerna Shake Cans or Servings Per Day:  2       Frequency:  Daily  Entered: Nov 30 2023  3:06PM  
This patient has been assessed with a concern for Malnutrition and has been determined to have a diagnosis/diagnoses of Moderate protein-calorie malnutrition.    This patient is being managed with:   Diet NPO after Midnight-     NPO Start Date: 23-Nov-2023   NPO Start Time: 23:59  Except Medications  Entered: Nov 23 2023  5:16AM    Diet Consistent Carbohydrate/No Snacks-  Jerry(7 Gm Arginine/7 Gm Glut/1.2 Gm HMB     Qty per Day:  2  Supplement Feeding Modality:  Oral  Glucerna Shake Cans or Servings Per Day:  2       Frequency:  Daily  Entered: Nov 22 2023  4:01PM    Diet Consistent Carbohydrate/No Snacks-  No Concentrated Potassium  Supplement Feeding Modality:  Oral  Glucerna Shake Cans or Servings Per Day:  2       Frequency:  Daily  Entered: Nov 22 2023 11:58AM    The following pending diet order is being considered for treatment of Moderate protein-calorie malnutrition:null
This patient has been assessed with a concern for Malnutrition and has been determined to have a diagnosis/diagnoses of Moderate protein-calorie malnutrition.    This patient is being managed with:   Diet Consistent Carbohydrate/No Snacks-  Jerry(7 Gm Arginine/7 Gm Glut/1.2 Gm HMB     Qty per Day:  2  Supplement Feeding Modality:  Oral  Glucerna Shake Cans or Servings Per Day:  2       Frequency:  Daily  Entered: Nov 22 2023  4:01PM    Diet Consistent Carbohydrate/No Snacks-  No Concentrated Potassium  Supplement Feeding Modality:  Oral  Glucerna Shake Cans or Servings Per Day:  2       Frequency:  Daily  Entered: Nov 22 2023 11:58AM    The following pending diet order is being considered for treatment of Moderate protein-calorie malnutrition:null
This patient has been assessed with a concern for Malnutrition and has been determined to have a diagnosis/diagnoses of Moderate protein-calorie malnutrition.    This patient is being managed with:   Diet Consistent Carbohydrate/No Snacks-  No Concentrated Potassium  Supplement Feeding Modality:  Oral  Glucerna Shake Cans or Servings Per Day:  2       Frequency:  Daily  Entered: Nov 30 2023  3:06PM  
This patient has been assessed with a concern for Malnutrition and has been determined to have a diagnosis/diagnoses of Moderate protein-calorie malnutrition.    This patient is being managed with:   Diet NPO after Midnight-     NPO Start Date: 21-Nov-2023   NPO Start Time: 23:59  Entered: Nov 21 2023  8:45AM    Diet Consistent Carbohydrate/No Snacks-  No Concentrated Potassium  Supplement Feeding Modality:  Oral  Glucerna Shake Cans or Servings Per Day:  2       Frequency:  Daily  Entered: Nov 19 2023  8:35AM  
This patient has been assessed with a concern for Malnutrition and has been determined to have a diagnosis/diagnoses of Moderate protein-calorie malnutrition.    This patient is being managed with:   Diet NPO after Midnight-     NPO Start Date: 23-Nov-2023   NPO Start Time: 23:59  Except Medications  Entered: Nov 23 2023  5:16AM    Diet Consistent Carbohydrate/No Snacks-  Jerry(7 Gm Arginine/7 Gm Glut/1.2 Gm HMB     Qty per Day:  2  Supplement Feeding Modality:  Oral  Glucerna Shake Cans or Servings Per Day:  2       Frequency:  Daily  Entered: Nov 22 2023  4:01PM    Diet Consistent Carbohydrate/No Snacks-  No Concentrated Potassium  Supplement Feeding Modality:  Oral  Glucerna Shake Cans or Servings Per Day:  2       Frequency:  Daily  Entered: Nov 22 2023 11:58AM    The following pending diet order is being considered for treatment of Moderate protein-calorie malnutrition:null
This patient has been assessed with a concern for Malnutrition and has been determined to have a diagnosis/diagnoses of Moderate protein-calorie malnutrition.    This patient is being managed with:   Diet Consistent Carbohydrate/No Snacks-  Jerry(7 Gm Arginine/7 Gm Glut/1.2 Gm HMB     Qty per Day:  2  Supplement Feeding Modality:  Oral  Glucerna Shake Cans or Servings Per Day:  2       Frequency:  Daily  Entered: Nov 22 2023  4:01PM    Diet Consistent Carbohydrate/No Snacks-  No Concentrated Potassium  Supplement Feeding Modality:  Oral  Glucerna Shake Cans or Servings Per Day:  2       Frequency:  Daily  Entered: Nov 22 2023 11:58AM    The following pending diet order is being considered for treatment of Moderate protein-calorie malnutrition:null
This patient has been assessed with a concern for Malnutrition and has been determined to have a diagnosis/diagnoses of Moderate protein-calorie malnutrition.    This patient is being managed with:   Diet Consistent Carbohydrate/No Snacks-  Jerry(7 Gm Arginine/7 Gm Glut/1.2 Gm HMB     Qty per Day:  2  Supplement Feeding Modality:  Oral  Glucerna Shake Cans or Servings Per Day:  2       Frequency:  Daily  Entered: Nov 22 2023  4:01PM    Diet Consistent Carbohydrate/No Snacks-  No Concentrated Potassium  Supplement Feeding Modality:  Oral  Glucerna Shake Cans or Servings Per Day:  2       Frequency:  Daily  Entered: Nov 22 2023 11:58AM    The following pending diet order is being considered for treatment of Moderate protein-calorie malnutrition:null
This patient has been assessed with a concern for Malnutrition and has been determined to have a diagnosis/diagnoses of Moderate protein-calorie malnutrition.    This patient is being managed with:   Diet Consistent Carbohydrate/No Snacks-  No Concentrated Potassium  Supplement Feeding Modality:  Oral  Glucerna Shake Cans or Servings Per Day:  2       Frequency:  Daily  Entered: Nov 18 2023  9:16AM  
This patient has been assessed with a concern for Malnutrition and has been determined to have a diagnosis/diagnoses of Moderate protein-calorie malnutrition.    This patient is being managed with:   Diet Consistent Carbohydrate/No Snacks-  No Concentrated Potassium  Supplement Feeding Modality:  Oral  Glucerna Shake Cans or Servings Per Day:  2       Frequency:  Daily  Entered: Nov 19 2023  8:35AM  
This patient has been assessed with a concern for Malnutrition and has been determined to have a diagnosis/diagnoses of Moderate protein-calorie malnutrition.    This patient is being managed with:   Diet Consistent Carbohydrate/No Snacks-  No Concentrated Potassium  Supplement Feeding Modality:  Oral  Glucerna Shake Cans or Servings Per Day:  2       Frequency:  Daily  Entered: Nov 30 2023  3:06PM  
This patient has been assessed with a concern for Malnutrition and has been determined to have a diagnosis/diagnoses of Moderate protein-calorie malnutrition.    This patient is being managed with:   Diet NPO after Midnight-     NPO Start Date: 18-Nov-2023   NPO Start Time: 23:59  Entered: Nov 18 2023  4:27PM    Diet Consistent Carbohydrate/No Snacks-  No Concentrated Potassium  Supplement Feeding Modality:  Oral  Glucerna Shake Cans or Servings Per Day:  2       Frequency:  Daily  Entered: Nov 18 2023  9:16AM  
This patient has been assessed with a concern for Malnutrition and has been determined to have a diagnosis/diagnoses of Moderate protein-calorie malnutrition.    This patient is being managed with:   Diet NPO after Midnight-     NPO Start Date: 21-Nov-2023   NPO Start Time: 23:59  Entered: Nov 21 2023  8:45AM    Diet Consistent Carbohydrate/No Snacks-  No Concentrated Potassium  Supplement Feeding Modality:  Oral  Glucerna Shake Cans or Servings Per Day:  2       Frequency:  Daily  Entered: Nov 19 2023  8:35AM  
This patient has been assessed with a concern for Malnutrition and has been determined to have a diagnosis/diagnoses of Moderate protein-calorie malnutrition.    This patient is being managed with:   Diet Consistent Carbohydrate/No Snacks-  Jerry(7 Gm Arginine/7 Gm Glut/1.2 Gm HMB     Qty per Day:  2  Supplement Feeding Modality:  Oral  Glucerna Shake Cans or Servings Per Day:  2       Frequency:  Daily  Entered: Nov 22 2023  4:01PM    Diet Consistent Carbohydrate/No Snacks-  No Concentrated Potassium  Supplement Feeding Modality:  Oral  Glucerna Shake Cans or Servings Per Day:  2       Frequency:  Daily  Entered: Nov 22 2023 11:58AM    The following pending diet order is being considered for treatment of Moderate protein-calorie malnutrition:null
This patient has been assessed with a concern for Malnutrition and has been determined to have a diagnosis/diagnoses of Moderate protein-calorie malnutrition.    This patient is being managed with:   Diet Consistent Carbohydrate/No Snacks-  Jerry(7 Gm Arginine/7 Gm Glut/1.2 Gm HMB     Qty per Day:  2  Supplement Feeding Modality:  Oral  Glucerna Shake Cans or Servings Per Day:  2       Frequency:  Daily  Entered: Nov 22 2023  4:01PM    Diet Consistent Carbohydrate/No Snacks-  No Concentrated Potassium  Supplement Feeding Modality:  Oral  Glucerna Shake Cans or Servings Per Day:  2       Frequency:  Daily  Entered: Nov 22 2023 11:58AM    The following pending diet order is being considered for treatment of Moderate protein-calorie malnutrition:null
This patient has been assessed with a concern for Malnutrition and has been determined to have a diagnosis/diagnoses of Moderate protein-calorie malnutrition.    This patient is being managed with:   Diet Consistent Carbohydrate/No Snacks-  No Concentrated Potassium  Supplement Feeding Modality:  Oral  Glucerna Shake Cans or Servings Per Day:  2       Frequency:  Daily  Entered: Nov 30 2023  3:06PM  
This patient has been assessed with a concern for Malnutrition and has been determined to have a diagnosis/diagnoses of Moderate protein-calorie malnutrition.    This patient is being managed with:   Diet NPO after Midnight-     NPO Start Date: 18-Nov-2023   NPO Start Time: 23:59  Entered: Nov 18 2023  4:27PM    Diet Consistent Carbohydrate/No Snacks-  No Concentrated Potassium  Supplement Feeding Modality:  Oral  Glucerna Shake Cans or Servings Per Day:  2       Frequency:  Daily  Entered: Nov 18 2023  9:16AM  
This patient has been assessed with a concern for Malnutrition and has been determined to have a diagnosis/diagnoses of Moderate protein-calorie malnutrition.    This patient is being managed with:   Diet Consistent Carbohydrate/No Snacks-  No Concentrated Potassium  Supplement Feeding Modality:  Oral  Glucerna Shake Cans or Servings Per Day:  2       Frequency:  Daily  Entered: Nov 30 2023  3:06PM  
This patient has been assessed with a concern for Malnutrition and has been determined to have a diagnosis/diagnoses of Moderate protein-calorie malnutrition.    This patient is being managed with:   Diet Consistent Carbohydrate/No Snacks-  No Concentrated Potassium  Supplement Feeding Modality:  Oral  Glucerna Shake Cans or Servings Per Day:  2       Frequency:  Daily  Entered: Nov 30 2023  3:06PM

## 2023-12-07 NOTE — PROGRESS NOTE ADULT - SUBJECTIVE AND OBJECTIVE BOX
[facility]-- WOUND TEAM -- FOLLOW UP NOTE  --------------------------------------------------------------------------------    24 hour events/subjective:    afebrile  tolerating po w/o n/v  incontinent   moving around in bed  dc planning for DARVIN  pain being managed-   tolerating dressings- no odor or drainage      Diet:  Diet, Consistent Carbohydrate/No Snacks:   No Concentrated Potassium  Supplement Feeding Modality:  Oral  Glucerna Shake Cans or Servings Per Day:  2       Frequency:  Daily (11-30-23 @ 18:17)      ROS: General/ SKIN/ MSK/ Vasc see HPI  all other systems negative    ALLERGIES & MEDICATIONS  --------------------------------------------------------------------------------  Allergies  penicillin (Anaphylaxis)      STANDING INPATIENT MEDICATIONS  amLODIPine Tablet 5 milliGRAM(s) Oral daily  chlorhexidine 2% Cloths 1 Application(s) Topical two times a day  chlorhexidine 2% Cloths 1 Application(s) Topical daily  dextrose 5%. 1000 milliLiter(s) IV Continuous <Continuous>  dextrose 5%. 1000 milliLiter(s) IV Continuous <Continuous>  dextrose 50% Injectable 25 Gram(s) IV Push once  dextrose 50% Injectable 12.5 Gram(s) IV Push once  dextrose 50% Injectable 25 Gram(s) IV Push once  DULoxetine 30 milliGRAM(s) Oral daily  enoxaparin Injectable 40 milliGRAM(s) SubCutaneous every 24 hours  gabapentin 300 milliGRAM(s) Oral three times a day  glucagon  Injectable 1 milliGRAM(s) IntraMuscular once  influenza   Vaccine 0.5 milliLiter(s) IntraMuscular once  insulin lispro (ADMELOG) corrective regimen sliding scale   SubCutaneous three times a day before meals  insulin lispro (ADMELOG) corrective regimen sliding scale   SubCutaneous at bedtime  lisinopril 20 milliGRAM(s) Oral daily  polyethylene glycol 3350 17 Gram(s) Oral daily  senna 2 Tablet(s) Oral at bedtime  tamsulosin 0.4 milliGRAM(s) Oral at bedtime  trimethoprim  160 mG/sulfamethoxazole 800 mG 1 Tablet(s) Oral two times a day      PRN INPATIENT MEDICATION  acetaminophen Tablet 650 milliGRAM(s) Oral every 6 hours PRN  dextrose Oral Gel 15 Gram(s) Oral once PRN  ketorolac Injectable 15 milliGRAM(s) IV Push once PRN  ondansetron Injectable 4 milliGRAM(s) IV Push once PRN  oxyCODONE  IR 5 milliGRAM(s) Oral every 6 hours PRN      VITALS/PHYSICAL EXAM  --------------------------------------------------------------------------------  T(C): 36.7 (12-07-23 @ 14:14), Max: 37.1 (12-06-23 @ 21:20)  HR: 72 (12-07-23 @ 14:14) (56 - 79)  BP: 145/75 (12-07-23 @ 14:14) (132/72 - 150/72)  RR: 18 (12-07-23 @ 14:14) (18 - 18)  SpO2: 95% (12-07-23 @ 14:14) (95% - 98%)  Wt(kg): --      PHYSICAL EXAM:  General: NAD Alert, WN/ WD/ WG  VersaCare P500 bed  HEENT: NC/AT clear sclera, no drainage, neck supple, mucosal membranes moist  GI: abd soft ND/NT  Neurology: weakened strength, verbal, following commands  Vascular: BLE warm   MSK: s/p Rt AKA, no contractions  Psych: calm, appropriate  Skin: moist w/ good turgor  Sacrum/bilateral buttocks unstageable pressure injury  with central area of soft, black and grey slough  surrounded by superficially denuded skin in and around the gluteal cleft  8cm x 7.5cm x 0cm   w/o blistering or drainage  No odor, erythema, induration, fluctuance, increase warmth, tenderness      CAPILLARY BLOOD GLUCOSE  POCT Blood Glucose.: 228 mg/dL (07 Dec 2023 12:21)  POCT Blood Glucose.: 166 mg/dL (07 Dec 2023 09:48)  POCT Blood Glucose.: 221 mg/dL (06 Dec 2023 21:41)  POCT Blood Glucose.: 152 mg/dL (06 Dec 2023 17:42)      A1C with Estimated Average Glucose Result: 8.8 % (11-18-23 @ 07:10)   [facility]-- WOUND TEAM -- FOLLOW UP NOTE  --------------------------------------------------------------------------------    24 hour events/subjective:    afebrile  tolerating po w/o n/v  incontinent   moving around in bed  dc planning for DARVIN  pain being managed-   tolerating dressings- no odor or drainage      Diet:  Diet, Consistent Carbohydrate/No Snacks:   No Concentrated Potassium  Supplement Feeding Modality:  Oral  Glucerna Shake Cans or Servings Per Day:  2       Frequency:  Daily (11-30-23 @ 18:17)      ROS: General/ SKIN/ MSK/ Vasc see HPI  all other systems negative    ALLERGIES & MEDICATIONS  --------------------------------------------------------------------------------  Allergies  penicillin (Anaphylaxis)      STANDING INPATIENT MEDICATIONS  amLODIPine Tablet 5 milliGRAM(s) Oral daily  chlorhexidine 2% Cloths 1 Application(s) Topical two times a day  chlorhexidine 2% Cloths 1 Application(s) Topical daily  dextrose 5%. 1000 milliLiter(s) IV Continuous <Continuous>  dextrose 5%. 1000 milliLiter(s) IV Continuous <Continuous>  dextrose 50% Injectable 25 Gram(s) IV Push once  dextrose 50% Injectable 12.5 Gram(s) IV Push once  dextrose 50% Injectable 25 Gram(s) IV Push once  DULoxetine 30 milliGRAM(s) Oral daily  enoxaparin Injectable 40 milliGRAM(s) SubCutaneous every 24 hours  gabapentin 300 milliGRAM(s) Oral three times a day  glucagon  Injectable 1 milliGRAM(s) IntraMuscular once  influenza   Vaccine 0.5 milliLiter(s) IntraMuscular once  insulin lispro (ADMELOG) corrective regimen sliding scale   SubCutaneous three times a day before meals  insulin lispro (ADMELOG) corrective regimen sliding scale   SubCutaneous at bedtime  lisinopril 20 milliGRAM(s) Oral daily  polyethylene glycol 3350 17 Gram(s) Oral daily  senna 2 Tablet(s) Oral at bedtime  tamsulosin 0.4 milliGRAM(s) Oral at bedtime  trimethoprim  160 mG/sulfamethoxazole 800 mG 1 Tablet(s) Oral two times a day      PRN INPATIENT MEDICATION  acetaminophen Tablet 650 milliGRAM(s) Oral every 6 hours PRN  dextrose Oral Gel 15 Gram(s) Oral once PRN  ketorolac Injectable 15 milliGRAM(s) IV Push once PRN  ondansetron Injectable 4 milliGRAM(s) IV Push once PRN  oxyCODONE  IR 5 milliGRAM(s) Oral every 6 hours PRN      VITALS/PHYSICAL EXAM  --------------------------------------------------------------------------------  T(C): 36.7 (12-07-23 @ 14:14), Max: 37.1 (12-06-23 @ 21:20)  HR: 72 (12-07-23 @ 14:14) (56 - 79)  BP: 145/75 (12-07-23 @ 14:14) (132/72 - 150/72)  RR: 18 (12-07-23 @ 14:14) (18 - 18)  SpO2: 95% (12-07-23 @ 14:14) (95% - 98%)  Wt(kg): --      PHYSICAL EXAM:  General: NAD Alert, WN/ WD/ WG  VersaCare P500 bed  HEENT: NC/AT clear sclera, no drainage, neck supple, mucosal membranes moist  GI: abd soft ND/NT  Neurology: weakened strength, verbal, following commands  Vascular: BLE warm   MSK: s/p Rt AKA, no contractions  Psych: calm, appropriate  Skin: moist w/ good turgor  Sacrum/bilateral buttocks unstageable pressure injury  with central area of soft, black and grey slough  surrounded by superficially denuded skin in and around the gluteal cleft  8cm x 7.5cm x 0cm   w/o blistering or drainage  No odor, erythema, induration, fluctuance, increase warmth, tenderness      CAPILLARY BLOOD GLUCOSE  POCT Blood Glucose.: 228 mg/dL (07 Dec 2023 12:21)  POCT Blood Glucose.: 166 mg/dL (07 Dec 2023 09:48)  POCT Blood Glucose.: 221 mg/dL (06 Dec 2023 21:41)  POCT Blood Glucose.: 152 mg/dL (06 Dec 2023 17:42)      A1C with Estimated Average Glucose Result: 8.8 % (11-18-23 @ 07:10)

## 2023-12-07 NOTE — PROGRESS NOTE ADULT - SUBJECTIVE AND OBJECTIVE BOX
Patient is a 63y old  Male who presents with a chief complaint of Rt foot pain (03 Dec 2023 15:33)      SUBJECTIVE / OVERNIGHT EVENTS:    Events noted.  CONSTITUTIONAL: No fever,  or fatigue  RESPIRATORY: No cough, wheezing,  No shortness of breath  CARDIOVASCULAR: No chest pain, palpitations, dizziness, or leg swelling  GASTROINTESTINAL: No abdominal or epigastric pain.     T(C): 37.1 (12-07-23 @ 17:04), Max: 37.1 (12-07-23 @ 17:04)  HR: 70 (12-07-23 @ 17:04) (70 - 79)  BP: 158/80 (12-07-23 @ 17:04) (145/75 - 158/80)  RR: 18 (12-07-23 @ 17:04) (18 - 18)  SpO2: 97% (12-07-23 @ 17:04) (95% - 97%)      MEDICATIONS  (STANDING):  amLODIPine   Tablet 5 milliGRAM(s) Oral daily  chlorhexidine 2% Cloths 1 Application(s) Topical two times a day  chlorhexidine 2% Cloths 1 Application(s) Topical daily  dextrose 5%. 1000 milliLiter(s) (100 mL/Hr) IV Continuous <Continuous>  dextrose 5%. 1000 milliLiter(s) (50 mL/Hr) IV Continuous <Continuous>  dextrose 50% Injectable 12.5 Gram(s) IV Push once  dextrose 50% Injectable 25 Gram(s) IV Push once  dextrose 50% Injectable 25 Gram(s) IV Push once  DULoxetine 30 milliGRAM(s) Oral daily  enoxaparin Injectable 40 milliGRAM(s) SubCutaneous every 24 hours  gabapentin 300 milliGRAM(s) Oral three times a day  glucagon  Injectable 1 milliGRAM(s) IntraMuscular once  influenza   Vaccine 0.5 milliLiter(s) IntraMuscular once  insulin lispro (ADMELOG) corrective regimen sliding scale   SubCutaneous three times a day before meals  insulin lispro (ADMELOG) corrective regimen sliding scale   SubCutaneous at bedtime  lisinopril 20 milliGRAM(s) Oral daily  polyethylene glycol 3350 17 Gram(s) Oral daily  senna 2 Tablet(s) Oral at bedtime  tamsulosin 0.4 milliGRAM(s) Oral at bedtime  trimethoprim  160 mG/sulfamethoxazole 800 mG 1 Tablet(s) Oral two times a day    MEDICATIONS  (PRN):  acetaminophen     Tablet .. 650 milliGRAM(s) Oral every 6 hours PRN Temp greater or equal to 38C (100.4F)  dextrose Oral Gel 15 Gram(s) Oral once PRN Blood Glucose LESS THAN 70 milliGRAM(s)/deciliter  ketorolac   Injectable 15 milliGRAM(s) IV Push once PRN Severe Pain (7 - 10)  ondansetron Injectable 4 milliGRAM(s) IV Push once PRN Nausea and/or Vomiting  oxyCODONE    IR 5 milliGRAM(s) Oral every 6 hours PRN Severe Pain (7 - 10)    PHYSICAL EXAM:  GENERAL: NAD  NECK: Supple, No JVD  CHEST/LUNG: Clear to auscultation bilaterally; No wheezing.  HEART: Regular rate and rhythm; No murmurs, rubs, or gallops  ABDOMEN: Soft, Nontender, Nondistended; Bowel sounds present  EXTREMITIES:  Rt AKA  NEUROLOGY: AAO X 3             :    Imaging Personally Reviewed:    Consultant(s) Notes Reviewed:      Care Discussed with Consultants/Other Providers:

## 2023-12-08 ENCOUNTER — TRANSCRIPTION ENCOUNTER (OUTPATIENT)
Age: 63
End: 2023-12-08

## 2023-12-08 VITALS
TEMPERATURE: 98 F | SYSTOLIC BLOOD PRESSURE: 153 MMHG | DIASTOLIC BLOOD PRESSURE: 79 MMHG | HEART RATE: 73 BPM | RESPIRATION RATE: 18 BRPM | OXYGEN SATURATION: 96 %

## 2023-12-08 LAB
GLUCOSE BLDC GLUCOMTR-MCNC: 165 MG/DL — HIGH (ref 70–99)
GLUCOSE BLDC GLUCOMTR-MCNC: 165 MG/DL — HIGH (ref 70–99)
GLUCOSE BLDC GLUCOMTR-MCNC: 324 MG/DL — HIGH (ref 70–99)
GLUCOSE BLDC GLUCOMTR-MCNC: 324 MG/DL — HIGH (ref 70–99)

## 2023-12-08 PROCEDURE — 83605 ASSAY OF LACTIC ACID: CPT

## 2023-12-08 PROCEDURE — 97164 PT RE-EVAL EST PLAN CARE: CPT

## 2023-12-08 PROCEDURE — 87186 SC STD MICRODIL/AGAR DIL: CPT

## 2023-12-08 PROCEDURE — 82550 ASSAY OF CK (CPK): CPT

## 2023-12-08 PROCEDURE — 82570 ASSAY OF URINE CREATININE: CPT

## 2023-12-08 PROCEDURE — 85014 HEMATOCRIT: CPT

## 2023-12-08 PROCEDURE — 85610 PROTHROMBIN TIME: CPT

## 2023-12-08 PROCEDURE — 86901 BLOOD TYPING SEROLOGIC RH(D): CPT

## 2023-12-08 PROCEDURE — 85730 THROMBOPLASTIN TIME PARTIAL: CPT

## 2023-12-08 PROCEDURE — 84295 ASSAY OF SERUM SODIUM: CPT

## 2023-12-08 PROCEDURE — 87077 CULTURE AEROBIC IDENTIFY: CPT

## 2023-12-08 PROCEDURE — 86850 RBC ANTIBODY SCREEN: CPT

## 2023-12-08 PROCEDURE — 87641 MR-STAPH DNA AMP PROBE: CPT

## 2023-12-08 PROCEDURE — 75635 CT ANGIO ABDOMINAL ARTERIES: CPT | Mod: MA

## 2023-12-08 PROCEDURE — 76377 3D RENDER W/INTRP POSTPROCES: CPT

## 2023-12-08 PROCEDURE — 93975 VASCULAR STUDY: CPT

## 2023-12-08 PROCEDURE — 36415 COLL VENOUS BLD VENIPUNCTURE: CPT

## 2023-12-08 PROCEDURE — 86140 C-REACTIVE PROTEIN: CPT

## 2023-12-08 PROCEDURE — 82330 ASSAY OF CALCIUM: CPT

## 2023-12-08 PROCEDURE — 73706 CT ANGIO LWR EXTR W/O&W/DYE: CPT | Mod: MA

## 2023-12-08 PROCEDURE — 97161 PT EVAL LOW COMPLEX 20 MIN: CPT

## 2023-12-08 PROCEDURE — 86803 HEPATITIS C AB TEST: CPT

## 2023-12-08 PROCEDURE — 87640 STAPH A DNA AMP PROBE: CPT

## 2023-12-08 PROCEDURE — 80202 ASSAY OF VANCOMYCIN: CPT

## 2023-12-08 PROCEDURE — 80053 COMPREHEN METABOLIC PANEL: CPT

## 2023-12-08 PROCEDURE — 84156 ASSAY OF PROTEIN URINE: CPT

## 2023-12-08 PROCEDURE — 87150 DNA/RNA AMPLIFIED PROBE: CPT

## 2023-12-08 PROCEDURE — 88307 TISSUE EXAM BY PATHOLOGIST: CPT

## 2023-12-08 PROCEDURE — C9399: CPT

## 2023-12-08 PROCEDURE — 85018 HEMOGLOBIN: CPT

## 2023-12-08 PROCEDURE — 80048 BASIC METABOLIC PNL TOTAL CA: CPT

## 2023-12-08 PROCEDURE — 81001 URINALYSIS AUTO W/SCOPE: CPT

## 2023-12-08 PROCEDURE — 85025 COMPLETE CBC W/AUTO DIFF WBC: CPT

## 2023-12-08 PROCEDURE — 97530 THERAPEUTIC ACTIVITIES: CPT

## 2023-12-08 PROCEDURE — 84100 ASSAY OF PHOSPHORUS: CPT

## 2023-12-08 PROCEDURE — 88311 DECALCIFY TISSUE: CPT

## 2023-12-08 PROCEDURE — 93306 TTE W/DOPPLER COMPLETE: CPT

## 2023-12-08 PROCEDURE — 85652 RBC SED RATE AUTOMATED: CPT

## 2023-12-08 PROCEDURE — 93325 DOPPLER ECHO COLOR FLOW MAPG: CPT

## 2023-12-08 PROCEDURE — 85027 COMPLETE CBC AUTOMATED: CPT

## 2023-12-08 PROCEDURE — 84132 ASSAY OF SERUM POTASSIUM: CPT

## 2023-12-08 PROCEDURE — 99291 CRITICAL CARE FIRST HOUR: CPT

## 2023-12-08 PROCEDURE — 93312 ECHO TRANSESOPHAGEAL: CPT

## 2023-12-08 PROCEDURE — 83036 HEMOGLOBIN GLYCOSYLATED A1C: CPT

## 2023-12-08 PROCEDURE — 73610 X-RAY EXAM OF ANKLE: CPT

## 2023-12-08 PROCEDURE — 93005 ELECTROCARDIOGRAM TRACING: CPT

## 2023-12-08 PROCEDURE — 86900 BLOOD TYPING SEROLOGIC ABO: CPT

## 2023-12-08 PROCEDURE — 93320 DOPPLER ECHO COMPLETE: CPT

## 2023-12-08 PROCEDURE — 76870 US EXAM SCROTUM: CPT

## 2023-12-08 PROCEDURE — 82803 BLOOD GASES ANY COMBINATION: CPT

## 2023-12-08 PROCEDURE — 97110 THERAPEUTIC EXERCISES: CPT

## 2023-12-08 PROCEDURE — 87070 CULTURE OTHR SPECIMN AEROBIC: CPT

## 2023-12-08 PROCEDURE — 82962 GLUCOSE BLOOD TEST: CPT

## 2023-12-08 PROCEDURE — 82947 ASSAY GLUCOSE BLOOD QUANT: CPT

## 2023-12-08 PROCEDURE — 73630 X-RAY EXAM OF FOOT: CPT

## 2023-12-08 PROCEDURE — 82435 ASSAY OF BLOOD CHLORIDE: CPT

## 2023-12-08 PROCEDURE — 87086 URINE CULTURE/COLONY COUNT: CPT

## 2023-12-08 PROCEDURE — 96375 TX/PRO/DX INJ NEW DRUG ADDON: CPT

## 2023-12-08 PROCEDURE — 87040 BLOOD CULTURE FOR BACTERIA: CPT

## 2023-12-08 PROCEDURE — 96374 THER/PROPH/DIAG INJ IV PUSH: CPT

## 2023-12-08 PROCEDURE — 83735 ASSAY OF MAGNESIUM: CPT

## 2023-12-08 RX ORDER — SENNA PLUS 8.6 MG/1
2 TABLET ORAL
Qty: 0 | Refills: 0 | DISCHARGE
Start: 2023-12-08

## 2023-12-08 RX ORDER — DULOXETINE HYDROCHLORIDE 30 MG/1
1 CAPSULE, DELAYED RELEASE ORAL
Qty: 0 | Refills: 0 | DISCHARGE
Start: 2023-12-08

## 2023-12-08 RX ORDER — INSULIN LISPRO 100/ML
1 VIAL (ML) SUBCUTANEOUS
Qty: 0 | Refills: 0 | DISCHARGE
Start: 2023-12-08

## 2023-12-08 RX ORDER — ENOXAPARIN SODIUM 100 MG/ML
40 INJECTION SUBCUTANEOUS
Qty: 0 | Refills: 0 | DISCHARGE
Start: 2023-12-08

## 2023-12-08 RX ORDER — OXYCODONE HYDROCHLORIDE 5 MG/1
1 TABLET ORAL
Qty: 0 | Refills: 0 | DISCHARGE
Start: 2023-12-08

## 2023-12-08 RX ORDER — LISINOPRIL 2.5 MG/1
1 TABLET ORAL
Qty: 0 | Refills: 0 | DISCHARGE
Start: 2023-12-08

## 2023-12-08 RX ORDER — POLYETHYLENE GLYCOL 3350 17 G/17G
17 POWDER, FOR SOLUTION ORAL
Qty: 0 | Refills: 0 | DISCHARGE
Start: 2023-12-08

## 2023-12-08 RX ORDER — TAMSULOSIN HYDROCHLORIDE 0.4 MG/1
1 CAPSULE ORAL
Qty: 0 | Refills: 0 | DISCHARGE
Start: 2023-12-08

## 2023-12-08 RX ORDER — GABAPENTIN 400 MG/1
1 CAPSULE ORAL
Qty: 0 | Refills: 0 | DISCHARGE
Start: 2023-12-08

## 2023-12-08 RX ORDER — ACETAMINOPHEN 500 MG
2 TABLET ORAL
Qty: 0 | Refills: 0 | DISCHARGE
Start: 2023-12-08

## 2023-12-08 RX ADMIN — AMLODIPINE BESYLATE 5 MILLIGRAM(S): 2.5 TABLET ORAL at 06:14

## 2023-12-08 RX ADMIN — OXYCODONE HYDROCHLORIDE 5 MILLIGRAM(S): 5 TABLET ORAL at 10:10

## 2023-12-08 RX ADMIN — DULOXETINE HYDROCHLORIDE 30 MILLIGRAM(S): 30 CAPSULE, DELAYED RELEASE ORAL at 12:23

## 2023-12-08 RX ADMIN — LISINOPRIL 20 MILLIGRAM(S): 2.5 TABLET ORAL at 06:14

## 2023-12-08 RX ADMIN — OXYCODONE HYDROCHLORIDE 5 MILLIGRAM(S): 5 TABLET ORAL at 09:35

## 2023-12-08 RX ADMIN — Medication 1: at 08:30

## 2023-12-08 RX ADMIN — GABAPENTIN 300 MILLIGRAM(S): 400 CAPSULE ORAL at 06:13

## 2023-12-08 RX ADMIN — POLYETHYLENE GLYCOL 3350 17 GRAM(S): 17 POWDER, FOR SOLUTION ORAL at 12:23

## 2023-12-08 RX ADMIN — Medication 4: at 14:06

## 2023-12-08 RX ADMIN — GABAPENTIN 300 MILLIGRAM(S): 400 CAPSULE ORAL at 13:38

## 2023-12-08 RX ADMIN — Medication 1 TABLET(S): at 06:14

## 2023-12-08 RX ADMIN — CHLORHEXIDINE GLUCONATE 1 APPLICATION(S): 213 SOLUTION TOPICAL at 12:23

## 2023-12-08 NOTE — PROGRESS NOTE ADULT - PROVIDER SPECIALTY LIST ADULT
Anesthesia
Cardiology
Dental
Hospitalist
Hospitalist
Infectious Disease
Infectious Disease
Internal Medicine
Nephrology
Nephrology
Vascular Surgery
Anesthesia
Anesthesia
Cardiology
Cardiology
Dental
Hospitalist
Infectious Disease
Internal Medicine
Internal Medicine
Nephrology
Podiatry
Vascular Surgery
Anesthesia
Anesthesia
Cardiology
Dental
Hospitalist
Infectious Disease
Infectious Disease
Nephrology
Vascular Surgery
Cardiology
Hospitalist
Nephrology
Vascular Surgery
Cardiology
Cardiology
Hospitalist
Cardiology
Wound Care
Internal Medicine
Vascular Surgery

## 2023-12-08 NOTE — DISCHARGE NOTE NURSING/CASE MANAGEMENT/SOCIAL WORK - PATIENT PORTAL LINK FT
You can access the FollowMyHealth Patient Portal offered by [facility] by registering at the following website: [email or web address]. By joining [employer]’s FollowMyHealth portal, you will also be able to view your health information using other applications (apps) compatible with our system. You can access the FollowMyHealth Patient Portal offered by [facility] by registering at the following website: [email or web address]. By joining [employer]’s FollowMyHealth portal, you will also be able to view your health information using other applications (apps) compatible with our system.

## 2023-12-08 NOTE — DISCHARGE NOTE NURSING/CASE MANAGEMENT/SOCIAL WORK - NSDCPEFALRISK_GEN_ALL_CORE
For information on Fall & Injury Prevention, visit: [email or web address] OR  [email or web address] OR  https://www.cdc.gov/steadi/patient.html For information on Fall & Injury Prevention, visit: [email or web address] OR  [email or web address] OR  https://www.cdc.gov/steadi/patient.html

## 2023-12-08 NOTE — DISCHARGE NOTE NURSING/CASE MANAGEMENT/SOCIAL WORK - NSDCFUADDAPPT_GEN_ALL_CORE_FT
APPTS ARE READY TO BE MADE: [ x] YES    Best Family or Patient Contact (if needed):    Additional Information about above appointments (if needed):    1: PCP  2: Vascular surgery  3: Cardiology  3: Wound care - [facility] [address] [facility] [phone]  4: Podiatry: Podiatry Discharge Instructions:  Follow up: Please follow up with Dr. Haider within 1 week of discharge from the hospital, please call [phone] for appointment and discuss that you recently were seen in the hospital.  Wound Care:  Please apply betadine to the right foot followed by 4x4 gauze, and yajaira  Weight bearing: Please weight bear as tolerated in a surgical shoe.  Antibiotics: Please continue as instructed.    Other comments or requests:       Patient is being discharged to [facility]. Patient/caregiver will arrange follow up appointments.      APPTS ARE READY TO BE MADE: [ x] YES    Best Family or Patient Contact (if needed):    Additional Information about above appointments (if needed):    1: PCP  2: Vascular surgery  3: Cardiology  3: Wound care - Wound Center [address] [facility] [phone]  4: Podiatry: Podiatry Discharge Instructions:  Follow up: Please follow up with Dr. Haider within 1 week of discharge from the hospital, please call [phone] for appointment and discuss that you recently were seen in the hospital.  Wound Care:  Please apply betadine to the right foot followed by 4x4 gauze, and yajaira  Weight bearing: Please weight bear as tolerated in a surgical shoe.  Antibiotics: Please continue as instructed.    Other comments or requests:       Patient is being discharged to [facility]. Patient/caregiver will arrange follow up appointments.

## 2023-12-08 NOTE — PROGRESS NOTE ADULT - REASON FOR ADMISSION
Rt foot pain

## 2023-12-08 NOTE — PROGRESS NOTE ADULT - SUBJECTIVE AND OBJECTIVE BOX
Subjective: Patient seen and examined. No new events except as noted.     SUBJECTIVE/ROS:  MEDICATIONS:  MEDICATIONS  (STANDING):  amLODIPine   Tablet 5 milliGRAM(s) Oral daily  chlorhexidine 2% Cloths 1 Application(s) Topical daily  chlorhexidine 2% Cloths 1 Application(s) Topical two times a day  dextrose 5%. 1000 milliLiter(s) (100 mL/Hr) IV Continuous <Continuous>  dextrose 5%. 1000 milliLiter(s) (50 mL/Hr) IV Continuous <Continuous>  dextrose 50% Injectable 12.5 Gram(s) IV Push once  dextrose 50% Injectable 25 Gram(s) IV Push once  dextrose 50% Injectable 25 Gram(s) IV Push once  DULoxetine 30 milliGRAM(s) Oral daily  enoxaparin Injectable 40 milliGRAM(s) SubCutaneous every 24 hours  gabapentin 300 milliGRAM(s) Oral three times a day  glucagon  Injectable 1 milliGRAM(s) IntraMuscular once  influenza   Vaccine 0.5 milliLiter(s) IntraMuscular once  insulin lispro (ADMELOG) corrective regimen sliding scale   SubCutaneous three times a day before meals  insulin lispro (ADMELOG) corrective regimen sliding scale   SubCutaneous at bedtime  lisinopril 20 milliGRAM(s) Oral daily  polyethylene glycol 3350 17 Gram(s) Oral daily  senna 2 Tablet(s) Oral at bedtime  tamsulosin 0.4 milliGRAM(s) Oral at bedtime  trimethoprim  160 mG/sulfamethoxazole 800 mG 1 Tablet(s) Oral two times a day      PHYSICAL EXAM:  T(C): 36.7 (12-08-23 @ 05:18), Max: 37.2 (12-07-23 @ 21:42)  HR: 63 (12-08-23 @ 05:18) (61 - 79)  BP: 165/76 (12-08-23 @ 05:18) (145/75 - 165/76)  RR: 18 (12-08-23 @ 05:18) (18 - 18)  SpO2: 96% (12-08-23 @ 05:18) (95% - 98%)  Wt(kg): --  I&O's Summary    07 Dec 2023 07:01  -  08 Dec 2023 07:00  --------------------------------------------------------  IN: 1200 mL / OUT: 1800 mL / NET: -600 mL            JVP: Normal  Neck: supple  Lung: clear   CV: S1 S2 , Murmur:  Abd: soft  Ext: No edema  neuro: Awake / alert  Psych: flat affect      LABS/DATA:    CARDIAC MARKERS:                  proBNP:   Lipid Profile:   HgA1c:   TSH:     TELE:  EKG:

## 2023-12-09 LAB
-  AMIKACIN: SIGNIFICANT CHANGE UP
-  AMIKACIN: SIGNIFICANT CHANGE UP
-  AZTREONAM: SIGNIFICANT CHANGE UP
-  AZTREONAM: SIGNIFICANT CHANGE UP
-  CEFEPIME: SIGNIFICANT CHANGE UP
-  CEFEPIME: SIGNIFICANT CHANGE UP
-  CEFTAZIDIME: SIGNIFICANT CHANGE UP
-  CEFTAZIDIME: SIGNIFICANT CHANGE UP
-  CIPROFLOXACIN: SIGNIFICANT CHANGE UP
-  CIPROFLOXACIN: SIGNIFICANT CHANGE UP
-  IMIPENEM: SIGNIFICANT CHANGE UP
-  IMIPENEM: SIGNIFICANT CHANGE UP
-  LEVOFLOXACIN: SIGNIFICANT CHANGE UP
-  LEVOFLOXACIN: SIGNIFICANT CHANGE UP
-  MEROPENEM: SIGNIFICANT CHANGE UP
-  MEROPENEM: SIGNIFICANT CHANGE UP
-  PIPERACILLIN/TAZOBACTAM: SIGNIFICANT CHANGE UP
-  PIPERACILLIN/TAZOBACTAM: SIGNIFICANT CHANGE UP
CULTURE RESULTS: ABNORMAL
CULTURE RESULTS: ABNORMAL
METHOD TYPE: SIGNIFICANT CHANGE UP
METHOD TYPE: SIGNIFICANT CHANGE UP
ORGANISM # SPEC MICROSCOPIC CNT: ABNORMAL
SPECIMEN SOURCE: SIGNIFICANT CHANGE UP
SPECIMEN SOURCE: SIGNIFICANT CHANGE UP

## 2024-01-05 ENCOUNTER — APPOINTMENT (OUTPATIENT)
Dept: VASCULAR SURGERY | Facility: CLINIC | Age: 64
End: 2024-01-05
Payer: COMMERCIAL

## 2024-01-05 VITALS
SYSTOLIC BLOOD PRESSURE: 136 MMHG | WEIGHT: 120 LBS | HEART RATE: 84 BPM | HEIGHT: 66 IN | DIASTOLIC BLOOD PRESSURE: 84 MMHG | TEMPERATURE: 98 F | BODY MASS INDEX: 19.29 KG/M2

## 2024-01-05 DIAGNOSIS — Z89.619 ACQUIRED ABSENCE OF UNSPECIFIED LEG ABOVE KNEE: ICD-10-CM

## 2024-01-05 PROCEDURE — 99024 POSTOP FOLLOW-UP VISIT: CPT

## 2024-02-02 NOTE — ADDENDUM
[FreeTextEntry1] : 63 year old s/p right above knee amputation secondary to complications from PVD and Diabetes  Residual limb is healed and he is ready and motivated to be fit with a right above-knee prosthesis  Right lower extremity: Hip flexion 4/5, hip extension 4/5  Left lower extremity: Hip flexion 4/5, Hip extension 4/5, knee flexion 4/5, knee extension 4/5, dorsiflexion 4/5, plantarflexion 4/5  Prior to amputation, patient was independent with activities of daily living  He completed cooking, laundry, shopping, cleaning and care for the home  He enjoys playing sports, travelling with friends and family and needs to maintain his independence  Patient lives alone in a private home with 12 stairs  He demonstrates both the physical ability and medical necessity consistent with K-2 level ambulation at this time; he has the potential to progress to a K3-level ambulator  Patient's prosthesis must include a protective cover to maintain the integrity of the contralateral limb and prevent infection and/or injury  The rigid frame of the prosthesis must be constructed using ultralight materials (such as carbon fiber) laminated with acrylic resin. This construction (method and materials) will provide increased strength to the external frame, as the socket will be fenestrated to reduce pressure on the residual limb.  Patient's prosthesis will require a combination of gel socket insert and flexible inner socket/rigid frame design prosthesis (so that the rigid frame can be fenestrated and the flexible inner socket heated and pushed through the fenestrations in the frame) to relieve these bony prominences and permit the patient to walk farther and for longer periods of time without skin breakdown or excessive pressures.

## 2024-02-02 NOTE — HISTORY OF PRESENT ILLNESS
[FreeTextEntry1] : s/p kiel, then R AKA 11/30/23   [de-identified] : 1/5/24: no issues at amp site.  pain controlled

## 2024-02-02 NOTE — PHYSICAL EXAM
[Respiratory Effort] : normal respiratory effort [Normal Rate and Rhythm] : normal rate and rhythm [JVD] : no jugular venous distention  [de-identified] : awake, alert [FreeTextEntry1] : R AKA stump healing well, staples intact, no signs infection

## (undated) DEVICE — POSITIONER CARDIAC BUMP

## (undated) DEVICE — GLV 8 PROTEXIS (WHITE)

## (undated) DEVICE — SUT POLYSORB 4-0 18" TIES UNDYED

## (undated) DEVICE — DRSG TEGADERM 6"X8"

## (undated) DEVICE — DRSG CURITY GAUZE SPONGE 4 X 4" 12-PLY

## (undated) DEVICE — BLADE SCALPEL SAFETYLOCK #10

## (undated) DEVICE — SPECIMEN CONTAINER 100ML

## (undated) DEVICE — STAPLER SKIN VISI-STAT 35 WIDE

## (undated) DEVICE — SUT SURGIPRO 2-0 18" C-15

## (undated) DEVICE — MEDICATION LABELS W MARKER

## (undated) DEVICE — DRSG STOCKINETTE IMPERVIOUS XL

## (undated) DEVICE — DRSG KLING 6"

## (undated) DEVICE — SAW BLADE MICROAIRE OSCILATING 25.4MM X 90MM X 1.27MM

## (undated) DEVICE — DRAIN CHANNEL 19FR ROUND FULL FLUTED

## (undated) DEVICE — SUCTION YANKAUER NO CONTROL VENT

## (undated) DEVICE — MARKING PEN W RULER

## (undated) DEVICE — DRAPE LIGHT HANDLE COVER (BLUE)

## (undated) DEVICE — SUT SOFSILK 3-0 30" V-20

## (undated) DEVICE — SOL IRR POUR NS 0.9% 500ML

## (undated) DEVICE — DRSG TELFA 3 X 8

## (undated) DEVICE — SYR ASEPTO

## (undated) DEVICE — DRSG ACE BANDAGE 6"

## (undated) DEVICE — BLADE SCALPEL SAFETYLOCK #15

## (undated) DEVICE — BLADE SCALPEL SAFETYLOCK #11

## (undated) DEVICE — GLV 7.5 PROTEXIS (WHITE)

## (undated) DEVICE — SUT POLYSORB 0 18" MP UNDYED

## (undated) DEVICE — DRAPE 1/2 SHEET 40X57"

## (undated) DEVICE — DRAPE INSTRUMENT POUCH 6.75" X 11"

## (undated) DEVICE — ELCTR HEX BLADE

## (undated) DEVICE — SUT POLYSORB 2-0 30" GS-21 UNDYED

## (undated) DEVICE — DRAPE MAGNETIC INSTRUMENT MEDIUM

## (undated) DEVICE — VISITEC 4X4

## (undated) DEVICE — DRSG STERISTRIPS 0.5 X 4"

## (undated) DEVICE — GLV 7 PROTEXIS (WHITE)

## (undated) DEVICE — GLV 6.5 PROTEXIS (WHITE)

## (undated) DEVICE — SUT POLYSORB 2-0 18" TIES UNDYED

## (undated) DEVICE — DRSG XEROFORM 1 X 8"

## (undated) DEVICE — VENODYNE/SCD SLEEVE CALF LARGE

## (undated) DEVICE — WARMING BLANKET UPPER ADULT

## (undated) DEVICE — DRSG XEROFORM 2 X 2"

## (undated) DEVICE — SUT MONOSOF 2-0 18" C-15

## (undated) DEVICE — FOLEY HOLDER STATLOCK 2 WAY ADULT

## (undated) DEVICE — FOLEY TRAY 16FR 5CC LTX UMETER CLOSED

## (undated) DEVICE — SUT POLYSORB 3-0 30" V-20 UNDYED

## (undated) DEVICE — GLV 8.5 PROTEXIS (WHITE)

## (undated) DEVICE — GOWN TRIMAX LG

## (undated) DEVICE — DRAPE 3/4 SHEET W REINFORCEMENT 56X77"

## (undated) DEVICE — SUT MONOSOF 3-0 30" SC-2

## (undated) DEVICE — PACK EXTREMITY

## (undated) DEVICE — POSITIONER FOAM EGG CRATE ULNAR 2PCS (PINK)

## (undated) DEVICE — GOWN XL

## (undated) DEVICE — NDL COUNTER FOAM AND MAGNET 40-70

## (undated) DEVICE — DRAPE TOWEL BLUE 17" X 24"

## (undated) DEVICE — STRYKER INTERPULSE HANDPIECE W IRR SUCTION TUBE

## (undated) DEVICE — SUT SOFSILK 2-0 30" V-20

## (undated) DEVICE — SUT POLYSORB 2-0 36" KV-34 UNDYED

## (undated) DEVICE — SOL IRR POUR H2O 250ML

## (undated) DEVICE — DRSG COMBINE 5X9"

## (undated) DEVICE — DRAPE MAYO STAND 30"

## (undated) DEVICE — LAP PAD 18 X 18"

## (undated) DEVICE — DRAPE ISOLATION BAG 20X20"

## (undated) DEVICE — DRSG OPSITE 13.75 X 4"

## (undated) DEVICE — ELCTR BOVIE PENCIL HANDPIECE

## (undated) DEVICE — TUBING SUCTION 20FT

## (undated) DEVICE — PREP CHLORAPREP HI-LITE ORANGE 26ML

## (undated) DEVICE — DISSECTOR ENDO PEANUT 5MM